# Patient Record
Sex: FEMALE | Race: BLACK OR AFRICAN AMERICAN | NOT HISPANIC OR LATINO
[De-identification: names, ages, dates, MRNs, and addresses within clinical notes are randomized per-mention and may not be internally consistent; named-entity substitution may affect disease eponyms.]

---

## 2017-02-02 ENCOUNTER — RESULT REVIEW (OUTPATIENT)
Age: 52
End: 2017-02-02

## 2017-02-03 ENCOUNTER — OUTPATIENT (OUTPATIENT)
Dept: OUTPATIENT SERVICES | Facility: HOSPITAL | Age: 52
LOS: 1 days | End: 2017-02-03
Payer: MEDICAID

## 2017-02-03 PROCEDURE — 76942 ECHO GUIDE FOR BIOPSY: CPT | Mod: 26

## 2017-02-03 PROCEDURE — 88173 CYTOPATH EVAL FNA REPORT: CPT

## 2017-02-03 PROCEDURE — 76942 ECHO GUIDE FOR BIOPSY: CPT

## 2017-02-03 PROCEDURE — 10022: CPT

## 2017-03-16 PROBLEM — Z00.00 ENCOUNTER FOR PREVENTIVE HEALTH EXAMINATION: Status: ACTIVE | Noted: 2017-03-16

## 2017-03-20 ENCOUNTER — APPOINTMENT (OUTPATIENT)
Dept: OTOLARYNGOLOGY | Facility: CLINIC | Age: 52
End: 2017-03-20

## 2017-03-20 VITALS
WEIGHT: 231 LBS | DIASTOLIC BLOOD PRESSURE: 94 MMHG | BODY MASS INDEX: 36.26 KG/M2 | HEIGHT: 67 IN | SYSTOLIC BLOOD PRESSURE: 170 MMHG | TEMPERATURE: 98.4 F | OXYGEN SATURATION: 97 % | HEART RATE: 85 BPM

## 2017-03-20 DIAGNOSIS — Z82.49 FAMILY HISTORY OF ISCHEMIC HEART DISEASE AND OTHER DISEASES OF THE CIRCULATORY SYSTEM: ICD-10-CM

## 2017-03-20 DIAGNOSIS — Z83.3 FAMILY HISTORY OF DIABETES MELLITUS: ICD-10-CM

## 2017-03-20 DIAGNOSIS — Z78.9 OTHER SPECIFIED HEALTH STATUS: ICD-10-CM

## 2017-03-20 DIAGNOSIS — Z86.39 PERSONAL HISTORY OF OTHER ENDOCRINE, NUTRITIONAL AND METABOLIC DISEASE: ICD-10-CM

## 2017-03-20 DIAGNOSIS — Z56.0 UNEMPLOYMENT, UNSPECIFIED: ICD-10-CM

## 2017-03-20 SDOH — ECONOMIC STABILITY - INCOME SECURITY: UNEMPLOYMENT, UNSPECIFIED: Z56.0

## 2017-03-28 ENCOUNTER — RESULT REVIEW (OUTPATIENT)
Age: 52
End: 2017-03-28

## 2017-03-28 VITALS
TEMPERATURE: 98 F | RESPIRATION RATE: 16 BRPM | DIASTOLIC BLOOD PRESSURE: 77 MMHG | WEIGHT: 231.04 LBS | HEIGHT: 67 IN | HEART RATE: 97 BPM | OXYGEN SATURATION: 97 % | SYSTOLIC BLOOD PRESSURE: 143 MMHG

## 2017-03-29 ENCOUNTER — OUTPATIENT (OUTPATIENT)
Dept: OUTPATIENT SERVICES | Facility: HOSPITAL | Age: 52
LOS: 1 days | Discharge: HOME | End: 2017-03-29
Payer: COMMERCIAL

## 2017-03-29 ENCOUNTER — APPOINTMENT (OUTPATIENT)
Dept: OTOLARYNGOLOGY | Facility: HOSPITAL | Age: 52
End: 2017-03-29

## 2017-03-29 DIAGNOSIS — C73 MALIGNANT NEOPLASM OF THYROID GLAND: ICD-10-CM

## 2017-03-29 DIAGNOSIS — Z79.84 LONG TERM (CURRENT) USE OF ORAL HYPOGLYCEMIC DRUGS: ICD-10-CM

## 2017-03-29 DIAGNOSIS — E11.9 TYPE 2 DIABETES MELLITUS WITHOUT COMPLICATIONS: ICD-10-CM

## 2017-03-29 DIAGNOSIS — Z98.891 HISTORY OF UTERINE SCAR FROM PREVIOUS SURGERY: Chronic | ICD-10-CM

## 2017-03-29 DIAGNOSIS — Z90.710 ACQUIRED ABSENCE OF BOTH CERVIX AND UTERUS: Chronic | ICD-10-CM

## 2017-03-29 DIAGNOSIS — I10 ESSENTIAL (PRIMARY) HYPERTENSION: ICD-10-CM

## 2017-03-29 DIAGNOSIS — D35.1 BENIGN NEOPLASM OF PARATHYROID GLAND: ICD-10-CM

## 2017-03-29 DIAGNOSIS — Z98.890 OTHER SPECIFIED POSTPROCEDURAL STATES: Chronic | ICD-10-CM

## 2017-03-29 LAB
ALBUMIN SERPL ELPH-MCNC: 3.4 G/DL — SIGNIFICANT CHANGE UP (ref 3.4–5)
ALP SERPL-CCNC: 57 U/L — SIGNIFICANT CHANGE UP (ref 40–120)
ALT FLD-CCNC: 38 U/L — SIGNIFICANT CHANGE UP (ref 12–42)
ANION GAP SERPL CALC-SCNC: 11 MMOL/L — SIGNIFICANT CHANGE UP (ref 9–16)
AST SERPL-CCNC: 20 U/L — SIGNIFICANT CHANGE UP (ref 15–37)
BASOPHILS NFR BLD AUTO: 0.1 % — SIGNIFICANT CHANGE UP (ref 0–2)
BILIRUB SERPL-MCNC: 0.3 MG/DL — SIGNIFICANT CHANGE UP (ref 0.2–1.2)
BUN SERPL-MCNC: 13 MG/DL — SIGNIFICANT CHANGE UP (ref 7–23)
CALCIUM SERPL-MCNC: 9.4 MG/DL — SIGNIFICANT CHANGE UP (ref 8.5–10.5)
CHLORIDE SERPL-SCNC: 99 MMOL/L — SIGNIFICANT CHANGE UP (ref 96–108)
CO2 SERPL-SCNC: 26 MMOL/L — SIGNIFICANT CHANGE UP (ref 22–31)
CREAT SERPL-MCNC: 1.03 MG/DL — SIGNIFICANT CHANGE UP (ref 0.5–1.3)
GLUCOSE SERPL-MCNC: 246 MG/DL — HIGH (ref 70–99)
HCT VFR BLD CALC: 39 % — SIGNIFICANT CHANGE UP (ref 34.5–45)
HGB BLD-MCNC: 12.7 G/DL — SIGNIFICANT CHANGE UP (ref 11.5–15.5)
LYMPHOCYTES # BLD AUTO: 7 % — LOW (ref 13–44)
MAGNESIUM SERPL-MCNC: 1.3 MG/DL — LOW (ref 1.6–2.4)
MCHC RBC-ENTMCNC: 27.7 PG — SIGNIFICANT CHANGE UP (ref 27–34)
MCHC RBC-ENTMCNC: 32.6 G/DL — SIGNIFICANT CHANGE UP (ref 32–36)
MCV RBC AUTO: 85 FL — SIGNIFICANT CHANGE UP (ref 80–100)
MONOCYTES NFR BLD AUTO: 1.1 % — LOW (ref 2–14)
NEUTROPHILS NFR BLD AUTO: 91.8 % — HIGH (ref 43–77)
PHOSPHATE SERPL-MCNC: 2.9 MG/DL — SIGNIFICANT CHANGE UP (ref 2.5–4.5)
PLATELET # BLD AUTO: 357 K/UL — SIGNIFICANT CHANGE UP (ref 150–400)
POTASSIUM SERPL-MCNC: 4.2 MMOL/L — SIGNIFICANT CHANGE UP (ref 3.5–5.3)
POTASSIUM SERPL-SCNC: 4.2 MMOL/L — SIGNIFICANT CHANGE UP (ref 3.5–5.3)
PROT SERPL-MCNC: 8.1 G/DL — SIGNIFICANT CHANGE UP (ref 6.4–8.2)
PTH-INTACT IO % DIF SERPL: 15.9 PG/ML — SIGNIFICANT CHANGE UP (ref 8.5–72.5)
PTH-INTACT IO % DIF SERPL: 9.2 PG/ML — SIGNIFICANT CHANGE UP (ref 8.5–72.5)
PTH-INTACT IO % DIF SERPL: 99.6 PG/ML — HIGH (ref 8.5–72.5)
RBC # BLD: 4.59 M/UL — SIGNIFICANT CHANGE UP (ref 3.8–5.2)
RBC # FLD: 13.3 % — SIGNIFICANT CHANGE UP (ref 10.3–16.9)
SODIUM SERPL-SCNC: 136 MMOL/L — SIGNIFICANT CHANGE UP (ref 135–145)
WBC # BLD: 16 K/UL — HIGH (ref 3.8–10.5)
WBC # FLD AUTO: 16 K/UL — HIGH (ref 3.8–10.5)

## 2017-03-29 PROCEDURE — 60500 EXPLORE PARATHYROID GLANDS: CPT | Mod: 59

## 2017-03-29 PROCEDURE — 60252 REMOVAL OF THYROID: CPT

## 2017-03-29 RX ORDER — HYDRALAZINE HCL 50 MG
10 TABLET ORAL ONCE
Qty: 0 | Refills: 0 | Status: COMPLETED | OUTPATIENT
Start: 2017-03-29 | End: 2017-03-29

## 2017-03-29 RX ORDER — ONDANSETRON 8 MG/1
4 TABLET, FILM COATED ORAL EVERY 6 HOURS
Qty: 0 | Refills: 0 | Status: DISCONTINUED | OUTPATIENT
Start: 2017-03-29 | End: 2017-03-30

## 2017-03-29 RX ORDER — HYDROMORPHONE HYDROCHLORIDE 2 MG/ML
1 INJECTION INTRAMUSCULAR; INTRAVENOUS; SUBCUTANEOUS EVERY 4 HOURS
Qty: 0 | Refills: 0 | Status: DISCONTINUED | OUTPATIENT
Start: 2017-03-29 | End: 2017-03-30

## 2017-03-29 RX ORDER — SODIUM CHLORIDE 9 MG/ML
1000 INJECTION, SOLUTION INTRAVENOUS
Qty: 0 | Refills: 0 | Status: DISCONTINUED | OUTPATIENT
Start: 2017-03-29 | End: 2017-03-30

## 2017-03-29 RX ORDER — DEXTROSE 50 % IN WATER 50 %
1 SYRINGE (ML) INTRAVENOUS ONCE
Qty: 0 | Refills: 0 | Status: DISCONTINUED | OUTPATIENT
Start: 2017-03-29 | End: 2017-03-30

## 2017-03-29 RX ORDER — CALCITRIOL 0.5 UG/1
0.5 CAPSULE ORAL DAILY
Qty: 0 | Refills: 0 | Status: DISCONTINUED | OUTPATIENT
Start: 2017-03-29 | End: 2017-03-30

## 2017-03-29 RX ORDER — INSULIN LISPRO 100/ML
VIAL (ML) SUBCUTANEOUS
Qty: 0 | Refills: 0 | Status: DISCONTINUED | OUTPATIENT
Start: 2017-03-29 | End: 2017-03-30

## 2017-03-29 RX ORDER — SODIUM CHLORIDE 9 MG/ML
500 INJECTION INTRAMUSCULAR; INTRAVENOUS; SUBCUTANEOUS ONCE
Qty: 0 | Refills: 0 | Status: COMPLETED | OUTPATIENT
Start: 2017-03-29 | End: 2017-03-29

## 2017-03-29 RX ORDER — DEXTROSE 50 % IN WATER 50 %
12.5 SYRINGE (ML) INTRAVENOUS ONCE
Qty: 0 | Refills: 0 | Status: DISCONTINUED | OUTPATIENT
Start: 2017-03-29 | End: 2017-03-30

## 2017-03-29 RX ADMIN — CALCITRIOL 0.5 MICROGRAM(S): 0.5 CAPSULE ORAL at 21:17

## 2017-03-29 RX ADMIN — SODIUM CHLORIDE 1000 MILLILITER(S): 9 INJECTION INTRAMUSCULAR; INTRAVENOUS; SUBCUTANEOUS at 20:23

## 2017-03-29 RX ADMIN — Medication 10 MILLIGRAM(S): at 19:37

## 2017-03-29 RX ADMIN — Medication 4: at 21:59

## 2017-03-29 NOTE — BRIEF OPERATIVE NOTE - POST-OP DX
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

## 2017-03-30 VITALS
TEMPERATURE: 99 F | SYSTOLIC BLOOD PRESSURE: 141 MMHG | RESPIRATION RATE: 16 BRPM | HEART RATE: 108 BPM | DIASTOLIC BLOOD PRESSURE: 64 MMHG | OXYGEN SATURATION: 98 %

## 2017-03-30 LAB
ALBUMIN SERPL ELPH-MCNC: 3.3 G/DL — LOW (ref 3.4–5)
ALP SERPL-CCNC: 55 U/L — SIGNIFICANT CHANGE UP (ref 40–120)
ALT FLD-CCNC: 35 U/L — SIGNIFICANT CHANGE UP (ref 12–42)
ANION GAP SERPL CALC-SCNC: 8 MMOL/L — LOW (ref 9–16)
AST SERPL-CCNC: 27 U/L — SIGNIFICANT CHANGE UP (ref 15–37)
BILIRUB SERPL-MCNC: 0.4 MG/DL — SIGNIFICANT CHANGE UP (ref 0.2–1.2)
BUN SERPL-MCNC: 14 MG/DL — SIGNIFICANT CHANGE UP (ref 7–23)
CALCIUM SERPL-MCNC: 9.2 MG/DL — SIGNIFICANT CHANGE UP (ref 8.5–10.5)
CHLORIDE SERPL-SCNC: 100 MMOL/L — SIGNIFICANT CHANGE UP (ref 96–108)
CO2 SERPL-SCNC: 27 MMOL/L — SIGNIFICANT CHANGE UP (ref 22–31)
CREAT SERPL-MCNC: 0.86 MG/DL — SIGNIFICANT CHANGE UP (ref 0.5–1.3)
GLUCOSE SERPL-MCNC: 190 MG/DL — HIGH (ref 70–99)
HBA1C BLD-MCNC: 7.5 % — HIGH (ref 4.8–5.6)
HCT VFR BLD CALC: 37.5 % — SIGNIFICANT CHANGE UP (ref 34.5–45)
HGB BLD-MCNC: 12.1 G/DL — SIGNIFICANT CHANGE UP (ref 11.5–15.5)
MAGNESIUM SERPL-MCNC: 1.4 MG/DL — LOW (ref 1.6–2.4)
MCHC RBC-ENTMCNC: 27.4 PG — SIGNIFICANT CHANGE UP (ref 27–34)
MCHC RBC-ENTMCNC: 32.3 G/DL — SIGNIFICANT CHANGE UP (ref 32–36)
MCV RBC AUTO: 84.8 FL — SIGNIFICANT CHANGE UP (ref 80–100)
PHOSPHATE SERPL-MCNC: 2.3 MG/DL — LOW (ref 2.5–4.5)
PLATELET # BLD AUTO: 328 K/UL — SIGNIFICANT CHANGE UP (ref 150–400)
POTASSIUM SERPL-MCNC: 4.5 MMOL/L — SIGNIFICANT CHANGE UP (ref 3.5–5.3)
POTASSIUM SERPL-SCNC: 4.5 MMOL/L — SIGNIFICANT CHANGE UP (ref 3.5–5.3)
PROT SERPL-MCNC: 8.1 G/DL — SIGNIFICANT CHANGE UP (ref 6.4–8.2)
RBC # BLD: 4.42 M/UL — SIGNIFICANT CHANGE UP (ref 3.8–5.2)
RBC # FLD: 13.5 % — SIGNIFICANT CHANGE UP (ref 10.3–16.9)
SODIUM SERPL-SCNC: 135 MMOL/L — SIGNIFICANT CHANGE UP (ref 135–145)
WBC # BLD: 16.8 K/UL — HIGH (ref 3.8–10.5)
WBC # FLD AUTO: 16.8 K/UL — HIGH (ref 3.8–10.5)

## 2017-03-30 PROCEDURE — 80053 COMPREHEN METABOLIC PANEL: CPT

## 2017-03-30 PROCEDURE — 88331 PATH CONSLTJ SURG 1 BLK 1SPC: CPT

## 2017-03-30 PROCEDURE — 83735 ASSAY OF MAGNESIUM: CPT

## 2017-03-30 PROCEDURE — 83036 HEMOGLOBIN GLYCOSYLATED A1C: CPT

## 2017-03-30 PROCEDURE — 85027 COMPLETE CBC AUTOMATED: CPT

## 2017-03-30 PROCEDURE — 60252 REMOVAL OF THYROID: CPT

## 2017-03-30 PROCEDURE — 83970 ASSAY OF PARATHORMONE: CPT

## 2017-03-30 PROCEDURE — 84100 ASSAY OF PHOSPHORUS: CPT

## 2017-03-30 PROCEDURE — 36415 COLL VENOUS BLD VENIPUNCTURE: CPT

## 2017-03-30 PROCEDURE — 88309 TISSUE EXAM BY PATHOLOGIST: CPT

## 2017-03-30 PROCEDURE — 88305 TISSUE EXAM BY PATHOLOGIST: CPT

## 2017-03-30 PROCEDURE — 85025 COMPLETE CBC W/AUTO DIFF WBC: CPT

## 2017-03-30 RX ORDER — MAGNESIUM SULFATE 500 MG/ML
2 VIAL (ML) INJECTION
Qty: 0 | Refills: 0 | Status: COMPLETED | OUTPATIENT
Start: 2017-03-30 | End: 2017-03-30

## 2017-03-30 RX ADMIN — Medication: at 07:00

## 2017-03-30 RX ADMIN — Medication 50 GRAM(S): at 07:51

## 2017-03-30 RX ADMIN — Medication 63.75 MILLIMOLE(S): at 09:39

## 2017-03-30 RX ADMIN — Medication 50 GRAM(S): at 09:00

## 2017-03-30 NOTE — PROGRESS NOTE ADULT - SUBJECTIVE AND OBJECTIVE BOX
O/N: s/p total thyroidectomy. Corrected Ca 9.88 , Osiel  text results , pth 9.2 , patient tachy 117,  given Rocaltrol    STATUS POST:  Total Thyroidectomy    POST OPERATIVE DAY #: 1 O/N: s/p total thyroidectomy. Corrected Ca 9.88 , Osiel  text results , pth 9.2 , patient tachy 117,  given Rocaltrol    STATUS POST:  Total Thyroidectomy    POST OPERATIVE DAY #: 1    SUBJECTIVE:   No complaints. Pain controlled. Denies CP/SOB/N/V/palpitation/perioral numbness/weakness      MEDICATIONS  (STANDING):  lactated ringers. 1000milliLiter(s) IV Continuous <Continuous>  calcitriol   Capsule 0.5MICROGram(s) Oral daily  insulin lispro (HumaLOG) corrective regimen sliding scale  SubCutaneous Before meals and at bedtime  dextrose 50% Injectable 12.5Gram(s) IV Push once    MEDICATIONS  (PRN):  HYDROmorphone  Injectable 1milliGRAM(s) IV Push every 4 hours PRN Moderate Pain  ondansetron Injectable 4milliGRAM(s) IV Push every 6 hours PRN Nausea  dextrose Gel 1Dose(s) Oral once PRN Blood Glucose LESS THAN 70 milliGRAM(s)/deciliter      Vital Signs Last 24 Hrs  T(C): 37.5, Max: 37.5 (03-30 @ 05:07)  T(F): 99.5, Max: 99.5 (03-30 @ 05:07)  HR: 115 (109 - 121)  BP: 163/58 (137/65 - 175/83)  BP(mean): --  RR: 16 (16 - 18)  SpO2: 98% (92% - 99%)    Physical Exam:    General: A&Ox3, NAD.   Neuro: CN II-XII grossly intact  Neck: bulky dressing in place, CDI. MARTHA drain with SS output.  CV: RRR  Pulm: nonlabored breathing  Abd: soft, NTND, no guarding, no rebound.  Ext: No edema. WWP.       I&O's Detail    I & Os for current day (as of 30 Mar 2017 07:14)  =============================================  IN:    lactated ringers.: 240 ml    Total IN: 240 ml  ---------------------------------------------  OUT:    Voided: 700 ml    Drain: 25 ml    Total OUT: 725 ml  ---------------------------------------------  Total NET: -485 ml      LABS:                        12.1   16.8  )-----------( 328      ( 30 Mar 2017 04:06 )             37.5     30 Mar 2017 04:06    135    |  100    |  14     ----------------------------<  190    4.5     |  27     |  0.86     Ca    9.2        30 Mar 2017 04:06  Phos  2.3       30 Mar 2017 04:06  Mg     1.4       30 Mar 2017 04:06    TPro  8.1    /  Alb  3.3    /  TBili  0.4    /  DBili  x      /  AST  27     /  ALT  35     /  AlkPhos  55     30 Mar 2017 04:06

## 2017-03-30 NOTE — PROGRESS NOTE ADULT - SUBJECTIVE AND OBJECTIVE BOX
POST-OPERATIVE NOTE    Procedure: Total thyroidectomy    Diagnosis/Indication: Left Thyroid nodule     Surgeon: Dr. Cartagena    S: Pt complains of incisional pain,    O:  T(C): 37.3, Max: 37.3 (03-30 @ 04:00)  T(F): 99.1, Max: 99.1 (03-30 @ 04:00)  HR: 113 (113 - 117)  BP: 159/69 (159/69 - 160/81)  RR: 16 (16 - 16)  SpO2: 99% (97% - 99%)  Wt(kg): --                        12.7   16.0  )-----------( 357      ( 29 Mar 2017 22:15 )             39.0     29 Mar 2017 22:15    136    |  99     |  13     ----------------------------<  246    4.2     |  26     |  1.03     Ca    9.4        29 Mar 2017 22:15  Phos  2.9       29 Mar 2017 22:15  Mg     1.3       29 Mar 2017 22:15    TPro  8.1    /  Alb  3.4    /  TBili  0.3    /  DBili  x      /  AST  20     /  ALT  38     /  AlkPhos  57     29 Mar 2017 22:15      Gen: NAD, resting comfortably in bed  C/V: NSR  Pulm: Nonlabored breathing, no respiratory distress  Abd: soft, NT/ND  Extrem: WWP, no calf edema, SCDs in place    Incision bandage clean dry and intact   MARTHA drain serosanguineous       A/P: 52yFemale s/p above procedure  Diet: Clear liquid diet   IVF: LR@120  Pain/nausea control  Dispo plan: Possible Discharge  home today   f/u PTH

## 2017-03-30 NOTE — PROGRESS NOTE ADULT - ASSESSMENT
51 y/o female s/p total thyroidectomy    Diet: Clear liquid diet   IVF: LR@120  Pain/nausea control  Dispo plan: Possible Discharge  home today   f/u PTH

## 2017-04-03 ENCOUNTER — APPOINTMENT (OUTPATIENT)
Dept: OTOLARYNGOLOGY | Facility: CLINIC | Age: 52
End: 2017-04-03

## 2017-04-03 RX ORDER — AZITHROMYCIN DIHYDRATE 500 MG/1
500 TABLET, FILM COATED ORAL DAILY
Qty: 1 | Refills: 0 | Status: COMPLETED | COMMUNITY
Start: 2017-03-28 | End: 2017-04-03

## 2017-04-03 RX ORDER — CALCITRIOL 0.5 UG/1
0.5 CAPSULE, LIQUID FILLED ORAL
Qty: 5 | Refills: 0 | Status: COMPLETED | COMMUNITY
Start: 2017-03-28 | End: 2017-04-03

## 2017-04-03 RX ORDER — ACETAMINOPHEN AND CODEINE 300; 30 MG/1; MG/1
300-30 TABLET ORAL
Qty: 30 | Refills: 0 | Status: COMPLETED | COMMUNITY
Start: 2017-03-28 | End: 2017-04-03

## 2017-04-12 LAB — SURGICAL PATHOLOGY STUDY: SIGNIFICANT CHANGE UP

## 2017-06-08 PROBLEM — E04.1 NONTOXIC SINGLE THYROID NODULE: Chronic | Status: ACTIVE | Noted: 2017-03-28

## 2017-06-08 PROBLEM — E11.9 TYPE 2 DIABETES MELLITUS WITHOUT COMPLICATIONS: Chronic | Status: ACTIVE | Noted: 2017-03-28

## 2017-06-08 PROBLEM — I10 ESSENTIAL (PRIMARY) HYPERTENSION: Chronic | Status: ACTIVE | Noted: 2017-03-28

## 2017-07-06 ENCOUNTER — OTHER (OUTPATIENT)
Age: 52
End: 2017-07-06

## 2017-07-07 ENCOUNTER — APPOINTMENT (OUTPATIENT)
Dept: ORTHOPEDIC SURGERY | Facility: AMBULATORY SURGERY CENTER | Age: 52
End: 2017-07-07

## 2017-07-07 ENCOUNTER — OUTPATIENT (OUTPATIENT)
Dept: OUTPATIENT SERVICES | Facility: HOSPITAL | Age: 52
LOS: 1 days | Discharge: HOME | End: 2017-07-07
Payer: MEDICAID

## 2017-07-07 DIAGNOSIS — Z98.891 HISTORY OF UTERINE SCAR FROM PREVIOUS SURGERY: Chronic | ICD-10-CM

## 2017-07-07 DIAGNOSIS — Z90.710 ACQUIRED ABSENCE OF BOTH CERVIX AND UTERUS: Chronic | ICD-10-CM

## 2017-07-07 DIAGNOSIS — Z98.890 OTHER SPECIFIED POSTPROCEDURAL STATES: Chronic | ICD-10-CM

## 2017-07-07 PROCEDURE — 29824 SHO ARTHRS SRG DSTL CLAVICLC: CPT | Mod: LT

## 2017-07-07 PROCEDURE — 29826 SHO ARTHRS SRG DECOMPRESSION: CPT | Mod: LT

## 2017-07-07 PROCEDURE — 29823 SHO ARTHRS SRG XTNSV DBRDMT: CPT | Mod: LT

## 2017-07-07 PROCEDURE — 20926: CPT | Mod: 59

## 2017-07-11 DIAGNOSIS — E11.9 TYPE 2 DIABETES MELLITUS WITHOUT COMPLICATIONS: ICD-10-CM

## 2017-07-11 DIAGNOSIS — I10 ESSENTIAL (PRIMARY) HYPERTENSION: ICD-10-CM

## 2017-07-11 DIAGNOSIS — M75.42 IMPINGEMENT SYNDROME OF LEFT SHOULDER: ICD-10-CM

## 2017-07-11 DIAGNOSIS — M19.012 PRIMARY OSTEOARTHRITIS, LEFT SHOULDER: ICD-10-CM

## 2017-07-11 DIAGNOSIS — Z79.82 LONG TERM (CURRENT) USE OF ASPIRIN: ICD-10-CM

## 2017-07-11 DIAGNOSIS — M75.102 UNSPECIFIED ROTATOR CUFF TEAR OR RUPTURE OF LEFT SHOULDER, NOT SPECIFIED AS TRAUMATIC: ICD-10-CM

## 2017-07-11 DIAGNOSIS — E04.9 NONTOXIC GOITER, UNSPECIFIED: ICD-10-CM

## 2017-07-11 DIAGNOSIS — Z79.899 OTHER LONG TERM (CURRENT) DRUG THERAPY: ICD-10-CM

## 2017-07-11 DIAGNOSIS — Z79.84 LONG TERM (CURRENT) USE OF ORAL HYPOGLYCEMIC DRUGS: ICD-10-CM

## 2017-07-11 DIAGNOSIS — E78.5 HYPERLIPIDEMIA, UNSPECIFIED: ICD-10-CM

## 2017-07-11 DIAGNOSIS — E66.9 OBESITY, UNSPECIFIED: ICD-10-CM

## 2018-06-18 ENCOUNTER — APPOINTMENT (OUTPATIENT)
Dept: ORTHOPEDIC SURGERY | Facility: CLINIC | Age: 53
End: 2018-06-18
Payer: MEDICAID

## 2018-06-18 PROCEDURE — 99213 OFFICE O/P EST LOW 20 MIN: CPT | Mod: 25

## 2018-06-18 PROCEDURE — 20610 DRAIN/INJ JOINT/BURSA W/O US: CPT | Mod: LT

## 2018-06-25 ENCOUNTER — APPOINTMENT (OUTPATIENT)
Dept: ORTHOPEDIC SURGERY | Facility: CLINIC | Age: 53
End: 2018-06-25
Payer: MEDICAID

## 2018-06-25 DIAGNOSIS — M17.12 UNILATERAL PRIMARY OSTEOARTHRITIS, LEFT KNEE: ICD-10-CM

## 2018-06-25 PROCEDURE — 99213 OFFICE O/P EST LOW 20 MIN: CPT

## 2018-06-26 ENCOUNTER — LABORATORY RESULT (OUTPATIENT)
Age: 53
End: 2018-06-26

## 2018-06-26 ENCOUNTER — APPOINTMENT (OUTPATIENT)
Dept: ENDOCRINOLOGY | Facility: CLINIC | Age: 53
End: 2018-06-26
Payer: MEDICAID

## 2018-06-26 VITALS
WEIGHT: 233 LBS | DIASTOLIC BLOOD PRESSURE: 90 MMHG | SYSTOLIC BLOOD PRESSURE: 137 MMHG | BODY MASS INDEX: 36.57 KG/M2 | HEART RATE: 90 BPM | HEIGHT: 67 IN

## 2018-06-26 PROCEDURE — 83036 HEMOGLOBIN GLYCOSYLATED A1C: CPT | Mod: QW

## 2018-06-26 PROCEDURE — 99204 OFFICE O/P NEW MOD 45 MIN: CPT | Mod: 25

## 2018-06-26 RX ORDER — IBUPROFEN 200 MG/1
CAPSULE, LIQUID FILLED ORAL
Refills: 0 | Status: DISCONTINUED | COMMUNITY
End: 2018-06-26

## 2018-06-26 RX ORDER — MELOXICAM 15 MG/1
15 TABLET ORAL
Qty: 30 | Refills: 0 | Status: DISCONTINUED | COMMUNITY
Start: 2016-11-18 | End: 2018-06-26

## 2018-06-26 RX ORDER — HYDROCODONE BITARTRATE AND ACETAMINOPHEN 5; 325 MG/1; MG/1
5-325 TABLET ORAL
Qty: 30 | Refills: 0 | Status: DISCONTINUED | COMMUNITY
Start: 2017-07-06 | End: 2018-06-26

## 2018-06-26 RX ORDER — METFORMIN ER 500 MG 500 MG/1
500 TABLET ORAL
Qty: 60 | Refills: 0 | Status: DISCONTINUED | COMMUNITY
Start: 2016-12-08 | End: 2018-06-26

## 2018-06-26 RX ORDER — TRAMADOL HYDROCHLORIDE 50 MG/1
50 TABLET, COATED ORAL
Qty: 20 | Refills: 0 | Status: DISCONTINUED | COMMUNITY
Start: 2017-01-03 | End: 2018-06-26

## 2018-06-26 RX ORDER — METFORMIN HYDROCHLORIDE 500 MG/1
500 TABLET, COATED ORAL
Refills: 0 | Status: DISCONTINUED | COMMUNITY
End: 2018-06-26

## 2018-06-26 RX ORDER — CYCLOBENZAPRINE HYDROCHLORIDE 10 MG/1
10 TABLET, FILM COATED ORAL
Qty: 30 | Refills: 0 | Status: DISCONTINUED | COMMUNITY
Start: 2016-10-19 | End: 2018-06-26

## 2018-06-26 RX ORDER — HYDROXYZINE PAMOATE 50 MG/1
50 CAPSULE ORAL
Qty: 1 | Refills: 0 | Status: DISCONTINUED | COMMUNITY
Start: 2016-10-19 | End: 2018-06-26

## 2018-06-26 RX ORDER — ATORVASTATIN CALCIUM 10 MG/1
10 TABLET, FILM COATED ORAL
Qty: 30 | Refills: 0 | Status: DISCONTINUED | COMMUNITY
Start: 2016-11-30 | End: 2018-06-26

## 2018-06-26 RX ORDER — OXYCODONE HYDROCHLORIDE 20 MG/1
20 TABLET, FILM COATED, EXTENDED RELEASE ORAL
Qty: 10 | Refills: 0 | Status: DISCONTINUED | COMMUNITY
Start: 2017-07-06 | End: 2018-06-26

## 2018-06-26 RX ORDER — DIAZEPAM 2 MG/1
2 TABLET ORAL
Qty: 2 | Refills: 0 | Status: DISCONTINUED | COMMUNITY
Start: 2016-11-09 | End: 2018-06-26

## 2018-06-28 LAB
25(OH)D3 SERPL-MCNC: 14.4 NG/ML
ALBUMIN SERPL ELPH-MCNC: 4.7 G/DL
ALP BLD-CCNC: 49 U/L
ALT SERPL-CCNC: 23 U/L
ANION GAP SERPL CALC-SCNC: 15 MMOL/L
AST SERPL-CCNC: 19 U/L
BASOPHILS # BLD AUTO: 0.03 K/UL
BASOPHILS NFR BLD AUTO: 0.4 %
BILIRUB SERPL-MCNC: 0.2 MG/DL
BUN SERPL-MCNC: 17 MG/DL
CALCIUM SERPL-MCNC: 9.8 MG/DL
CHLORIDE SERPL-SCNC: 99 MMOL/L
CHOLEST SERPL-MCNC: 215 MG/DL
CHOLEST/HDLC SERPL: 2.6 RATIO
CO2 SERPL-SCNC: 27 MMOL/L
CREAT SERPL-MCNC: 1.21 MG/DL
CREAT SPEC-SCNC: 198 MG/DL
EOSINOPHIL # BLD AUTO: 0.11 K/UL
EOSINOPHIL NFR BLD AUTO: 1.4 %
GLUCOSE SERPL-MCNC: 99 MG/DL
HCT VFR BLD CALC: 40.8 %
HDLC SERPL-MCNC: 84 MG/DL
HGB BLD-MCNC: 12.9 G/DL
IMM GRANULOCYTES NFR BLD AUTO: 0.4 %
LDLC SERPL CALC-MCNC: 102 MG/DL
LYMPHOCYTES # BLD AUTO: 3.17 K/UL
LYMPHOCYTES NFR BLD AUTO: 40.3 %
MAN DIFF?: NORMAL
MCHC RBC-ENTMCNC: 28.2 PG
MCHC RBC-ENTMCNC: 31.6 GM/DL
MCV RBC AUTO: 89.3 FL
MICROALBUMIN 24H UR DL<=1MG/L-MCNC: 55.9 MG/DL
MICROALBUMIN/CREAT 24H UR-RTO: 282 MG/G
MONOCYTES # BLD AUTO: 0.37 K/UL
MONOCYTES NFR BLD AUTO: 4.7 %
NEUTROPHILS # BLD AUTO: 4.15 K/UL
NEUTROPHILS NFR BLD AUTO: 52.8 %
PLATELET # BLD AUTO: 335 K/UL
POTASSIUM SERPL-SCNC: 4.7 MMOL/L
PROT SERPL-MCNC: 8.3 G/DL
RBC # BLD: 4.57 M/UL
RBC # FLD: 14.3 %
SODIUM SERPL-SCNC: 141 MMOL/L
THYROGLOB AB SERPL IA-ACNC: <1.8 IU/ML
TRIGL SERPL-MCNC: 144 MG/DL
TSH SERPL-ACNC: 93.08 UIU/ML
VIT B12 SERPL-MCNC: 815 PG/ML
WBC # FLD AUTO: 7.86 K/UL

## 2018-06-29 ENCOUNTER — RESULT REVIEW (OUTPATIENT)
Age: 53
End: 2018-06-29

## 2018-07-04 ENCOUNTER — FORM ENCOUNTER (OUTPATIENT)
Age: 53
End: 2018-07-04

## 2018-07-05 ENCOUNTER — OUTPATIENT (OUTPATIENT)
Dept: OUTPATIENT SERVICES | Facility: HOSPITAL | Age: 53
LOS: 1 days | End: 2018-07-05

## 2018-07-05 ENCOUNTER — CHART COPY (OUTPATIENT)
Age: 53
End: 2018-07-05

## 2018-07-05 ENCOUNTER — APPOINTMENT (OUTPATIENT)
Dept: ULTRASOUND IMAGING | Facility: HOSPITAL | Age: 53
End: 2018-07-05

## 2018-07-05 DIAGNOSIS — Z98.891 HISTORY OF UTERINE SCAR FROM PREVIOUS SURGERY: Chronic | ICD-10-CM

## 2018-07-05 DIAGNOSIS — Z90.710 ACQUIRED ABSENCE OF BOTH CERVIX AND UTERUS: Chronic | ICD-10-CM

## 2018-07-05 DIAGNOSIS — Z98.890 OTHER SPECIFIED POSTPROCEDURAL STATES: Chronic | ICD-10-CM

## 2018-07-05 PROCEDURE — 76536 US EXAM OF HEAD AND NECK: CPT | Mod: 26

## 2018-07-08 ENCOUNTER — FORM ENCOUNTER (OUTPATIENT)
Age: 53
End: 2018-07-08

## 2018-07-09 ENCOUNTER — OUTPATIENT (OUTPATIENT)
Dept: OUTPATIENT SERVICES | Facility: HOSPITAL | Age: 53
LOS: 1 days | End: 2018-07-09
Payer: MEDICAID

## 2018-07-09 DIAGNOSIS — Z98.891 HISTORY OF UTERINE SCAR FROM PREVIOUS SURGERY: Chronic | ICD-10-CM

## 2018-07-09 DIAGNOSIS — Z85.850 PERSONAL HISTORY OF MALIGNANT NEOPLASM OF THYROID: ICD-10-CM

## 2018-07-09 DIAGNOSIS — Z90.710 ACQUIRED ABSENCE OF BOTH CERVIX AND UTERUS: Chronic | ICD-10-CM

## 2018-07-09 DIAGNOSIS — Z98.890 OTHER SPECIFIED POSTPROCEDURAL STATES: Chronic | ICD-10-CM

## 2018-07-09 DIAGNOSIS — E89.0 POSTPROCEDURAL HYPOTHYROIDISM: ICD-10-CM

## 2018-07-09 PROCEDURE — 79005 NUCLEAR RX ORAL ADMIN: CPT | Mod: 26

## 2018-07-09 RX ORDER — THYROTROPIN ALFA 0.9 MG/ML
0.9 INJECTION, POWDER, FOR SOLUTION INTRAMUSCULAR ONCE
Qty: 0 | Refills: 0 | Status: DISCONTINUED | OUTPATIENT
Start: 2018-07-09 | End: 2018-07-24

## 2018-07-12 ENCOUNTER — FORM ENCOUNTER (OUTPATIENT)
Age: 53
End: 2018-07-12

## 2018-07-13 PROCEDURE — 78018 THYROID MET IMAGING BODY: CPT

## 2018-07-13 PROCEDURE — 78018 THYROID MET IMAGING BODY: CPT | Mod: 26

## 2018-07-13 PROCEDURE — 79005 NUCLEAR RX ORAL ADMIN: CPT

## 2018-07-13 PROCEDURE — 76536 US EXAM OF HEAD AND NECK: CPT

## 2018-07-13 PROCEDURE — A9517: CPT

## 2018-08-15 ENCOUNTER — APPOINTMENT (OUTPATIENT)
Dept: ENDOCRINOLOGY | Facility: CLINIC | Age: 53
End: 2018-08-15
Payer: MEDICAID

## 2018-08-15 VITALS
HEIGHT: 67 IN | WEIGHT: 233 LBS | DIASTOLIC BLOOD PRESSURE: 81 MMHG | BODY MASS INDEX: 36.57 KG/M2 | HEART RATE: 83 BPM | SYSTOLIC BLOOD PRESSURE: 148 MMHG

## 2018-08-15 PROCEDURE — 99214 OFFICE O/P EST MOD 30 MIN: CPT | Mod: 25

## 2018-08-15 PROCEDURE — 82947 ASSAY GLUCOSE BLOOD QUANT: CPT | Mod: QW

## 2018-08-16 LAB
GLUCOSE BLDC GLUCOMTR-MCNC: 106
HBA1C MFR BLD HPLC: 7.3 %
TSH SERPL-ACNC: 0.22 UIU/ML
TTG IGA SER IA-ACNC: <5 UNITS
TTG IGA SER-ACNC: NEGATIVE
TTG IGG SER IA-ACNC: 6 UNITS
TTG IGG SER IA-ACNC: NEGATIVE

## 2018-09-25 ENCOUNTER — APPOINTMENT (OUTPATIENT)
Dept: ENDOCRINOLOGY | Facility: CLINIC | Age: 53
End: 2018-09-25
Payer: MEDICAID

## 2018-09-25 ENCOUNTER — LABORATORY RESULT (OUTPATIENT)
Age: 53
End: 2018-09-25

## 2018-09-25 ENCOUNTER — RESULT CHARGE (OUTPATIENT)
Age: 53
End: 2018-09-25

## 2018-09-25 VITALS
BODY MASS INDEX: 36.73 KG/M2 | HEIGHT: 67 IN | SYSTOLIC BLOOD PRESSURE: 163 MMHG | WEIGHT: 234 LBS | HEART RATE: 92 BPM | DIASTOLIC BLOOD PRESSURE: 96 MMHG

## 2018-09-25 LAB — GLUCOSE BLDC GLUCOMTR-MCNC: 131

## 2018-09-25 PROCEDURE — 99214 OFFICE O/P EST MOD 30 MIN: CPT | Mod: 25

## 2018-09-25 PROCEDURE — 82947 ASSAY GLUCOSE BLOOD QUANT: CPT | Mod: QW

## 2018-09-25 RX ORDER — LEVOTHYROXINE SODIUM 0.2 MG/1
200 TABLET ORAL
Qty: 30 | Refills: 0 | Status: DISCONTINUED | COMMUNITY
Start: 2017-04-03 | End: 2018-09-25

## 2018-09-27 LAB
ALBUMIN SERPL ELPH-MCNC: 4.2 G/DL
ALP BLD-CCNC: 50 U/L
ALT SERPL-CCNC: 18 U/L
ANION GAP SERPL CALC-SCNC: 13 MMOL/L
AST SERPL-CCNC: 20 U/L
BILIRUB SERPL-MCNC: 0.3 MG/DL
BUN SERPL-MCNC: 9 MG/DL
CALCIUM SERPL-MCNC: 9.5 MG/DL
CHLORIDE SERPL-SCNC: 101 MMOL/L
CHOLEST SERPL-MCNC: 189 MG/DL
CHOLEST/HDLC SERPL: 3.3 RATIO
CO2 SERPL-SCNC: 26 MMOL/L
CREAT SERPL-MCNC: 0.92 MG/DL
GLUCOSE SERPL-MCNC: 146 MG/DL
HDLC SERPL-MCNC: 58 MG/DL
LDLC SERPL CALC-MCNC: 106 MG/DL
POTASSIUM SERPL-SCNC: 4 MMOL/L
PROT SERPL-MCNC: 7.8 G/DL
SODIUM SERPL-SCNC: 140 MMOL/L
THYROGLOB AB SERPL IA-ACNC: <1.8 IU/ML
TRIGL SERPL-MCNC: 126 MG/DL
TSH SERPL-ACNC: 4.87 UIU/ML

## 2018-10-04 ENCOUNTER — RX RENEWAL (OUTPATIENT)
Age: 53
End: 2018-10-04

## 2018-11-13 ENCOUNTER — APPOINTMENT (OUTPATIENT)
Dept: ENDOCRINOLOGY | Facility: CLINIC | Age: 53
End: 2018-11-13

## 2018-12-27 ENCOUNTER — APPOINTMENT (OUTPATIENT)
Dept: ENDOCRINOLOGY | Facility: CLINIC | Age: 53
End: 2018-12-27
Payer: MEDICAID

## 2018-12-27 ENCOUNTER — LABORATORY RESULT (OUTPATIENT)
Age: 53
End: 2018-12-27

## 2018-12-27 VITALS
HEIGHT: 67 IN | WEIGHT: 238 LBS | BODY MASS INDEX: 37.35 KG/M2 | HEART RATE: 79 BPM | DIASTOLIC BLOOD PRESSURE: 87 MMHG | SYSTOLIC BLOOD PRESSURE: 159 MMHG

## 2018-12-27 LAB
GLUCOSE BLDC GLUCOMTR-MCNC: 110
HBA1C MFR BLD HPLC: 7.4

## 2018-12-27 PROCEDURE — G0008: CPT

## 2018-12-27 PROCEDURE — 82962 GLUCOSE BLOOD TEST: CPT

## 2018-12-27 PROCEDURE — 83036 HEMOGLOBIN GLYCOSYLATED A1C: CPT | Mod: QW

## 2018-12-27 PROCEDURE — 90686 IIV4 VACC NO PRSV 0.5 ML IM: CPT

## 2018-12-27 PROCEDURE — 99214 OFFICE O/P EST MOD 30 MIN: CPT | Mod: 25

## 2018-12-27 RX ORDER — EMPAGLIFLOZIN 25 MG/1
25 TABLET, FILM COATED ORAL DAILY
Qty: 90 | Refills: 3 | Status: ACTIVE | COMMUNITY
Start: 2018-09-25 | End: 1900-01-01

## 2019-01-07 LAB
25(OH)D3 SERPL-MCNC: 20.7 NG/ML
ALBUMIN SERPL ELPH-MCNC: 4.4 G/DL
ALP BLD-CCNC: 49 U/L
ALT SERPL-CCNC: 18 U/L
ANION GAP SERPL CALC-SCNC: 12 MMOL/L
AST SERPL-CCNC: 25 U/L
BILIRUB SERPL-MCNC: 0.2 MG/DL
BUN SERPL-MCNC: 12 MG/DL
CALCIUM SERPL-MCNC: 10.1 MG/DL
CHLORIDE SERPL-SCNC: 99 MMOL/L
CHOLEST SERPL-MCNC: 211 MG/DL
CHOLEST/HDLC SERPL: 3.3 RATIO
CO2 SERPL-SCNC: 28 MMOL/L
CREAT SERPL-MCNC: 0.93 MG/DL
GLUCOSE SERPL-MCNC: 106 MG/DL
HDLC SERPL-MCNC: 64 MG/DL
LDLC SERPL CALC-MCNC: 126 MG/DL
POTASSIUM SERPL-SCNC: 4.2 MMOL/L
PROT SERPL-MCNC: 8.2 G/DL
SODIUM SERPL-SCNC: 139 MMOL/L
THYROGLOB AB SERPL IA-ACNC: <1.8 IU/ML
TRIGL SERPL-MCNC: 105 MG/DL
TSH SERPL-ACNC: 24.89 UIU/ML

## 2019-01-11 ENCOUNTER — RX RENEWAL (OUTPATIENT)
Age: 54
End: 2019-01-11

## 2019-03-28 ENCOUNTER — APPOINTMENT (OUTPATIENT)
Dept: ENDOCRINOLOGY | Facility: CLINIC | Age: 54
End: 2019-03-28
Payer: MEDICAID

## 2019-03-28 VITALS
DIASTOLIC BLOOD PRESSURE: 87 MMHG | WEIGHT: 237 LBS | SYSTOLIC BLOOD PRESSURE: 153 MMHG | BODY MASS INDEX: 37.12 KG/M2 | HEART RATE: 99 BPM

## 2019-03-28 LAB
GLUCOSE BLDC GLUCOMTR-MCNC: 153
HBA1C MFR BLD HPLC: 7.5

## 2019-03-28 PROCEDURE — 99214 OFFICE O/P EST MOD 30 MIN: CPT | Mod: 25

## 2019-03-28 PROCEDURE — 82962 GLUCOSE BLOOD TEST: CPT

## 2019-03-28 PROCEDURE — 83036 HEMOGLOBIN GLYCOSYLATED A1C: CPT | Mod: QW

## 2019-03-29 ENCOUNTER — RX RENEWAL (OUTPATIENT)
Age: 54
End: 2019-03-29

## 2019-03-29 LAB
ALBUMIN SERPL ELPH-MCNC: 4.4 G/DL
ALP BLD-CCNC: 55 U/L
ALT SERPL-CCNC: 25 U/L
ANION GAP SERPL CALC-SCNC: 12 MMOL/L
AST SERPL-CCNC: 23 U/L
BILIRUB SERPL-MCNC: 0.2 MG/DL
BUN SERPL-MCNC: 15 MG/DL
CALCIUM SERPL-MCNC: 9.7 MG/DL
CHLORIDE SERPL-SCNC: 100 MMOL/L
CHOLEST SERPL-MCNC: 187 MG/DL
CHOLEST/HDLC SERPL: 3.3 RATIO
CO2 SERPL-SCNC: 27 MMOL/L
CREAT SERPL-MCNC: 1.1 MG/DL
GLUCOSE SERPL-MCNC: 146 MG/DL
HDLC SERPL-MCNC: 57 MG/DL
LDLC SERPL CALC-MCNC: 109 MG/DL
POTASSIUM SERPL-SCNC: 4.5 MMOL/L
PROT SERPL-MCNC: 7.9 G/DL
SODIUM SERPL-SCNC: 139 MMOL/L
TRIGL SERPL-MCNC: 106 MG/DL
TSH SERPL-ACNC: 0.63 UIU/ML

## 2019-03-29 RX ORDER — ROSUVASTATIN CALCIUM 40 MG/1
40 TABLET, FILM COATED ORAL DAILY
Qty: 90 | Refills: 3 | Status: ACTIVE | COMMUNITY
Start: 2018-08-15 | End: 1900-01-01

## 2019-03-29 NOTE — ASSESSMENT
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 Stage 1, T3N0M0 per pathology. She is status post total thyroidectomy in March 2017. She was unaware of her pathology until her initial visit here, significant for: papillary thyroid carcinoma, multifocal (3 foci); follicular variant of papillary carcinoma, infiltrative,  0.5 cm focus in right lobe upper pole with limited extrathyroidal extension and positive margins; follicular variant of papillary carcinoma, two foci in  left lobe, confined to the thyroid, a 1.3 cm circumscribed nodule  in mid pole, and a 0.5 cm infiltrative focus in lower pole; no positive lymph nodes. She is status post I-131 30 mCi in July 2018. We subsequently adjusted her dose of levothyroxine. Thyroglobulin trending down with negative antibodies.\par Continue levothyroxine 250 mcg daily pending TSH for goal 0.5-2.0 uIU/mL\par Check neck ultrasound in June 2019\par \par Type 2 diabetes mellitus. Point-of-care HbA1c 7.5% with blood glucose 153 mg/dL today; HbA1c 7.3% in August. No known history of micro- or macrovascular complications. We discussed adjusting metformin to 1000 mg twice daily instead of 1500 mg at dinner; she will let me know if tolerated.\par Adjust metformin ER to 1000 mg twice daily\par Continue Jardiance to 25 mg daily\par She is on aspirin\par She is on a blood pressure regimen with lisinopril; adjust lisinopril to 30 mg daily since blood pressure not at goal\par She is on a statin for cholesterol; check lipid panel and comprehensive metabolic panel today for monitoring\par Last ophthalmology appointment: August 2018, no evidence of diabetic retinopathy\par Last podiatrist appointment: October 2018\par Last dental appointment: January 2018 and again advised appointment\par She is up-to-date with influenza vaccine\par \par Vitamin D deficiency. We started ergocalciferol 50,000 intl units weekly in June, adjusted to every other week in September but vitamin D was not at goal. She did not receive additional vitamin D from her pharmacy.\par Restart ergocalciferol 50,000 intl weekly\par \par Return to clinic in 3 months.\par \par CC:\par Dr. Radha Sarah, Fax [phone]

## 2019-03-29 NOTE — PHYSICAL EXAM
[Alert] : alert [No Acute Distress] : no acute distress [Healthy Appearance] : healthy appearance [Normal Sclera/Conjunctiva] : normal sclera/conjunctiva [Normal Oropharynx] : the oropharynx was normal [No Neck Mass] : no neck mass was observed [Supple] : the neck was supple [No LAD] : no lymphadenopathy [Well Healed Scar] : well healed scar [Normal Rate and Effort] : normal respiratory rhythm and effort [Clear to Auscultation] : lungs were clear to auscultation bilaterally [Normal Rate] : heart rate was normal  [Normal S1, S2] : normal S1 and S2 [Regular Rhythm] : with a regular rhythm [No Edema] : there was no peripheral edema [No Stigmata of Cushings Syndrome] : no stigmata of cushings syndrome [Normal Gait] : normal gait [Acanthosis Nigricans] : acanthosis nigricans [Normal Insight/Judgement] : insight and judgment were intact [Kyphosis] : no kyphosis present [de-identified] : no palpable thyroid tissue

## 2019-03-29 NOTE — ADDENDUM
[FreeTextEntry1] : Recent test results as below; discussed with Ms. Gaines. TSH at goal and refills sent for levothyroxine 275 mcg. LDL not at goal and will adjust rosuvastatin to 40 mg daily. 3/29/19

## 2019-03-29 NOTE — HISTORY OF PRESENT ILLNESS
[FreeTextEntry1] : Ms. Gaines is a 54 year-old woman with a history of papillary/follicular thyroid cancer with postsurgical hypothyroidism, type 2 diabetes mellitus, history of bronchiolitis obliterans organizing pneumonia presenting for follow-up of her endocrine disease. 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 She did not follow-up for review of pathology and was unaware of her diagnosis of thyroid cancer. Review of pathology findings remarkable for: - Papillary thyroid carcinoma, multifocal (3 foci). - Follicular variant of papillary carcinoma, infiltrative,  0.5 cm focus in right lobe upper pole. - Tumor exhibits limited extrathyroidal extension, and involves inked margins at superior pole. - Follicular variant of papillary carcinoma, two foci in  left lobe, confined to the thyroid, a 1.3 cm circumscribed nodule  in mid pole, and a 0.5 cm infiltrative focus in lower pole. - Five central compartment lymph nodes, negative for  metastasis (0/5).\par She is status post I-131 30 mCi in July 2018. Posttreatment scan of the neck and chest showed activity in the neck but no evidence of metastatic disease.\par She was started on levothyroxine 75 mcg daily postoperatively. She was taking 200 mcg daily at the time of her initial visit. We increased her dose to 250 mcg daily after her I-131 therapy. Celiac antibodies were checked due to high levothyroxine requirements and were negative.\par In August 2018 we adjusted her dose of levothyroxine to levothyroxine 250 mcg 6 days/week and 200 mcg 1 day/week.\par In September 2018 we adjusted her dose of levothyroxine to levothyroxine 250 mcg daily.\par She is taking levothyroxine in the morning, on an empty stomach, with plain water, and waiting at least 30 minutes before eating. She is not taking calcium/iron/multivitamin.\par Niece with history of goiter. No other family history of thyroid disease.\par \par Type 2 diabetes mellitus. Point-of-care HbA1c 7.5% with blood glucose 153 mg/dL today; HbA1c 7.4% in August. No known history of micro- or macrovascular complications.\par She was diagnosed with diabetes around 2013.\par She was taking metformin 500 mg daily at the time of her initial visit. In June we adjusted her dose up to 1500 mg daily. She did not tolerate 2000 mg daily.\par In September 2018 we started Jardiance 10 mg daily, adjusted to 25 mg daily in December\par She is not checking blood sugars\par She is on aspirin\par She is on a blood pressure regimen with lisinopril\par She is on a statin for cholesterol\par Last ophthalmology appointment: August 2018, no evidence of diabetic retinopathy\par Last podiatrist appointment: October 2018\par Last dental appointment: January 2018\par She is up-to-date with influenza vaccine\par \par Interim History \par Laboratory tests from last visit as below. Last visit we adjusted levothyroxine and Jardiance. Thyroglobulin trending down. We adjusted rosuvastatin. We restarted weekly vitamin D 50,000 intl units; the pharmacy did not provider her with the prescription.\par She states she is taking metformin only every other day.\par She feels well today and is without complaints. She denies fixed/hard neck mass, chest pain, shortness of breath, lower extremity numbness/tingling. Review of systems otherwise negative. Her daughter is due with a baby girl soon; she has a 12 year-old grandson. \par Medical and surgical history, medications, allergies, social and family history reviewed and updated as needed.

## 2019-03-29 NOTE — DATA REVIEWED
[FreeTextEntry1] : Post-ablation I-131 scan (July 13, 2018) reviewed and significant for:\par Findings: There is intense activity in the patient's thyroid remnant  which consists of 2 focal areas of activity, one apparently central in  the neck and the other on the left side somewhat more superiorly.  No other activity is identified.  Impression: Ablation of a thyroid remnant. No evidence of metastatic  disease. \par \par Pathology from total thyroidectomy (March 29, 2017) reviewed and significant for:\par 1. Rule out left inferior parathyroid adenoma (FS): - Enlarged hypercellular parathyroid, adherent to thymic tissue (see note).  Note: On permanent sections the parathyroid exhibits intermixed parenchymal fat, such that both hyperplasia and adenoma are in the differential.  2. Right inferior parathyroid, biopsy (FS): - Minute fragment of parathyroid tissue, unremarkable.  3. Thyroid gland, total thyroidectomy: - Papillary thyroid carcinoma, multifocal (3 foci). - Follicular variant of papillary carcinoma, infiltrative,  0.5 cm focus in right lobe upper pole. - Tumor exhibits limited extrathyroidal extension, and  involves inked margins at superior pole. - Follicular variant of papillary carcinoma, two foci in  left lobe, confined to the thyroid, a 1.3 cm circumscribed nodule in mid pole, and a 0.5 cm infiltrative focus in lower pole. - Five central compartment lymph nodes, negative for  metastasis (0/5). - See Synoptic Report.\par Tumor sizes: - Right lobe: 0.5 cm (with extrathyroidal extension) - Left lobe:   1.3 cm and 0.5 cm (confined to thyroid) Histologic type: - Papillary thyroid carcinoma, follicular variant (2 tumors infiltrative, 1 circumscribed) Margins: - Margins involved by carcinoma (right lobe superior pole) Lymphatic invasion: - Not identified Perineural invasion: - Not identified Extrathyroidal extension: - Present; extent - limited (right lobe superior pole)  Pathologic Staging (pTNM): pT(m)3, pN0  Regional lymph nodes: Central compartment dissection -  Number of lymph nodes examined: 5 -  Number of lymph nodes involved:   0  Additional pathologic findings: - Enlarged hypercellular parathyroid and thymic tissue, left inferior

## 2019-07-30 NOTE — ASU PATIENT PROFILE, ADULT - BRADEN SCORE (IF 18 OR LESS ACTIVATE SKIN INJURY RISK INCREASED GUIDELINE), MLM
Detail Level: Zone
Quality 226: Preventive Care And Screening: Tobacco Use: Screening And Cessation Intervention: Patient screened for tobacco use and is an ex/non-smoker
23

## 2019-08-05 ENCOUNTER — RX RENEWAL (OUTPATIENT)
Age: 54
End: 2019-08-05

## 2019-10-07 ENCOUNTER — RX RENEWAL (OUTPATIENT)
Age: 54
End: 2019-10-07

## 2019-10-07 RX ORDER — LISINOPRIL 30 MG/1
30 TABLET ORAL DAILY
Qty: 90 | Refills: 1 | Status: ACTIVE | COMMUNITY
Start: 2019-10-07 | End: 1900-01-01

## 2019-11-13 ENCOUNTER — RX RENEWAL (OUTPATIENT)
Age: 54
End: 2019-11-13

## 2019-11-25 ENCOUNTER — RX RENEWAL (OUTPATIENT)
Age: 54
End: 2019-11-25

## 2020-02-26 ENCOUNTER — RX RENEWAL (OUTPATIENT)
Age: 55
End: 2020-02-26

## 2020-03-09 ENCOUNTER — RX RENEWAL (OUTPATIENT)
Age: 55
End: 2020-03-09

## 2020-05-06 ENCOUNTER — RX RENEWAL (OUTPATIENT)
Age: 55
End: 2020-05-06

## 2022-09-19 ENCOUNTER — APPOINTMENT (OUTPATIENT)
Dept: ENDOCRINOLOGY | Facility: CLINIC | Age: 57
End: 2022-09-19

## 2022-09-19 VITALS
DIASTOLIC BLOOD PRESSURE: 78 MMHG | HEIGHT: 67 IN | WEIGHT: 228 LBS | BODY MASS INDEX: 35.79 KG/M2 | HEART RATE: 77 BPM | SYSTOLIC BLOOD PRESSURE: 135 MMHG

## 2022-09-19 LAB
GLUCOSE BLDC GLUCOMTR-MCNC: 140
HBA1C MFR BLD HPLC: 7.9

## 2022-09-19 PROCEDURE — 83036 HEMOGLOBIN GLYCOSYLATED A1C: CPT | Mod: QW

## 2022-09-19 PROCEDURE — 82962 GLUCOSE BLOOD TEST: CPT

## 2022-09-19 PROCEDURE — 99204 OFFICE O/P NEW MOD 45 MIN: CPT | Mod: 25

## 2022-09-19 RX ORDER — CHOLECALCIFEROL (VITAMIN D3) 1250 MCG
1.25 MG CAPSULE ORAL
Qty: 12 | Refills: 3 | Status: DISCONTINUED | COMMUNITY
Start: 2018-06-29 | End: 2022-09-19

## 2022-09-19 RX ORDER — BLOOD-GLUCOSE METER
W/DEVICE EACH MISCELLANEOUS
Qty: 1 | Refills: 0 | Status: ACTIVE | COMMUNITY
Start: 2022-09-19 | End: 1900-01-01

## 2022-09-19 RX ORDER — BLOOD SUGAR DIAGNOSTIC
STRIP MISCELLANEOUS
Qty: 100 | Refills: 3 | Status: ACTIVE | COMMUNITY
Start: 2022-09-19 | End: 1900-01-01

## 2022-09-19 RX ORDER — LANCETS
EACH MISCELLANEOUS
Qty: 100 | Refills: 3 | Status: ACTIVE | COMMUNITY
Start: 2022-09-19 | End: 1900-01-01

## 2022-09-21 LAB
25(OH)D3 SERPL-MCNC: 16.2 NG/ML
ALBUMIN SERPL ELPH-MCNC: 4.5 G/DL
ALP BLD-CCNC: 65 U/L
ALT SERPL-CCNC: 24 U/L
ANION GAP SERPL CALC-SCNC: 13 MMOL/L
AST SERPL-CCNC: 23 U/L
BILIRUB SERPL-MCNC: 0.2 MG/DL
BUN SERPL-MCNC: 15 MG/DL
CALCIUM SERPL-MCNC: 9.6 MG/DL
CHLORIDE SERPL-SCNC: 101 MMOL/L
CHOLEST SERPL-MCNC: 140 MG/DL
CO2 SERPL-SCNC: 26 MMOL/L
CREAT SERPL-MCNC: 1 MG/DL
CREAT SPEC-SCNC: 179 MG/DL
EGFR: 67 ML/MIN/1.73M2
ESTIMATED AVERAGE GLUCOSE: 189 MG/DL
GLUCOSE SERPL-MCNC: 130 MG/DL
HBA1C MFR BLD HPLC: 8.2 %
HDLC SERPL-MCNC: 56 MG/DL
LDLC SERPL CALC-MCNC: 67 MG/DL
MICROALBUMIN 24H UR DL<=1MG/L-MCNC: 18.5 MG/DL
MICROALBUMIN/CREAT 24H UR-RTO: 104 MG/G
NONHDLC SERPL-MCNC: 84 MG/DL
POTASSIUM SERPL-SCNC: 5 MMOL/L
PROT SERPL-MCNC: 8 G/DL
SODIUM SERPL-SCNC: 139 MMOL/L
T4 FREE SERPL-MCNC: 1.6 NG/DL
THYROGLOB AB SERPL-ACNC: <20 IU/ML
THYROGLOB SERPL-MCNC: <0.2 NG/ML
TRIGL SERPL-MCNC: 83 MG/DL
TSH SERPL-ACNC: 0.63 UIU/ML
VIT B12 SERPL-MCNC: 996 PG/ML

## 2022-09-21 RX ORDER — METFORMIN ER 500 MG 500 MG/1
500 TABLET ORAL DAILY
Qty: 360 | Refills: 1 | Status: ACTIVE | COMMUNITY
Start: 2018-06-26 | End: 1900-01-01

## 2022-09-21 NOTE — PHYSICAL EXAM
[Alert] : alert [Well Nourished] : well nourished [No Acute Distress] : no acute distress [Normal Sclera/Conjunctiva] : normal sclera/conjunctiva [No Neck Mass] : no neck mass was observed [No Respiratory Distress] : no respiratory distress [Normal Rate and Effort] : normal respiratory rate and effort [Clear to Auscultation] : lungs were clear to auscultation bilaterally [Normal PMI] : the apical impulse was normal [Normal S1, S2] : normal S1 and S2 [Regular Rhythm] : with a regular rhythm [Normal Gait] : normal gait [Oriented x3] : oriented to person, place, and time [Normal Affect] : the affect was normal [Normal Insight/Judgement] : insight and judgment were intact [Normal Mood] : the mood was normal [de-identified] : No palpable thyroid gland

## 2022-09-21 NOTE — ADDENDUM
[FreeTextEntry1] : 9/21/22, addendum, SG--\par Called and discussed labs with patient\par TFTs at goal with NEG TG/Tg AB, continue current LT4 regimen and plan for repeat thyroid US\par A1C 8.2%,  continue plan from office visit, patient was previously tolerating ER formulation, now having GI SE on IR metformin formulation, causing patient to skip days of medication and worsening glycemic control.  RX for Metformin ER sent to pharmacy, continue plan to see nutritionist\par UCR positive, 104, improved from last UCR of 232, on lisinopril 30, if remains elevated with improvement of glycemic control, will increase lisinopril. Normal creat/GFR\par CMP WNL\par cholesterol at goal\par \par Continue plan for FU in 3 months, will re-establish with Dr. Dodd for this 3 month FU

## 2022-09-21 NOTE — HISTORY OF PRESENT ILLNESS
[FreeTextEntry1] : HENRY PERDOMO is a 57 year female with pmhx of T2D, postoperative hypothyroidism, papillary thyroid cancer (s/p total thyroidectomy 3/2017) who presents for T2D evaluation.\par \par Patient of Dr. Dodd, last visit >3 years ago, making her considered a new patient\par Saw PCP since last visit\par \par Referred back to endocrine r/t sugars "being up"\par Skips metformin (all of daily dose) intermittently r/t feeling like pills stuck/nausea.  Takes all 4 tablets at once in attempt to not forget dose later in the day.  Intermittent GI side effects too following dose, believes that she takes them predominantly fasting\par Endorses diet/portions as barrier to glycemic control, as well as metformin issue as above\par Open to seeing nutritionist \par \par \par A1C - 7.9% (9/19/22 on POCT) from 7.5 (3/2019)\par Office Fingerstick -- 140 (fasting)\par \par Current medication:\par - Metformin 500mg 4 tablets daily\par - Jardiance 25mg daily\par \par Past medication:\par - NONE\par \par HENRY reports they take their diabetes medication most of the time.  Feels like metformin gets stuck in throat.  Typically takes 4 \par HENRY denies hypoglycemia symptoms or BG <70\par \par Diabetes Self-Management:\par Glucometer: None\par No current glucometer\par \par Diet--\par Skips breakfast, can also skip lunch then resulting in large portion for dinner\par Beverages: water, rare soda\par \par Ophthalmology: early 2022\par Podiatry: pre-pandemic. Referral provided today\par \par Denies personal history of heart disease.\par Denies personal history of renal disease.\par \par 2-3. Postoperative hypothyroidism, h/o papillary thyroid cancer\par T3N0M0, per pathology: papillary thyroid carcinoma, multifocal (3 foci); follicular variant of papillary carcinoma, infiltrative, 0.5 cm focus in right lobe upper pole with limited extrathyroidal extension and positive margins; follicular variant of papillary carcinoma, two foci in left lobe, confined to the thyroid, a 1.3 cm circumscribed nodule in mid pole, and a 0.5 cm infiltrative focus in lower pole; no positive lymph nodes.\par S/p total thyroidectomy in 3/2017\par S/p I-131 30 mCi in July 2018\par \par Current regimen: Levothyroxine 275mcg QAM\par \par

## 2022-09-21 NOTE — ASSESSMENT
[FreeTextEntry1] : 1. T2D\par A1C goal <7%, more ideally, <6.5%\par A1C - 7.9% (9/19/22 on POCT) from 7.5 (3/2019)\par Current regimen: Metformin 500mg (4 tablets daily), Jardiance 25mg daily\par Glucometer readings at home reveal N/a\par Glucometer reading performed in office today is 140 (fasting\par \par Kaleigh endorses missing full day of metformin dosing intermittently r/t GI discomfort after taking medication.  Will review if she is still on ER formulation, if not, will send ER formulation.  Discussed taking metformin with food +/-  metformin dosing to mitigate GI effects, which she is open to.\par She endorses barrier to glycemic goal of skipping meals and then consuming large portions, open to nutritionist and diet modification\par -- referral to nutrition, diet modification discussed\par -- continue Metformin and Jardiance regimen, as above, if not at glycemic goal in 3 months, will discuss add-on therapy\par -- Glucometer RX sent to pharmacy to initiate BG monitoring\par \par Counseling provided regarding T2DM (physiology of T2DM, inclusive of pancreatic dysfunction, insulin resistance,), evaluation and management with lifestyle and pharmacologic approach.\par Counseling provided regarding the need for glycemic control to prevent symptomatic hypo/hyper-glycemia as well as to prevent future micro-/macro-vascular disease. \par Counseling provided regarding diabetic guidelines- diabetes care plan handout utilized as visual aid (monitoring of glycemic control, knowing numbers- BG, cholesterol, BP, annual diabetic eye and foot exam).\par \par \par Diabetes Health Care Maintenance\par · Opthalmology up to date: yes\par · Podiatry up to date: referral provided today\par · Antiplatelet agent:  yes\par · Urine microalbumin: ordered today, 9/19/22\par · ACE-I/ARB:  Yes\par · Patient to contact office for persistent glucose < 70 or > 300\par \par \par 2-3. Postoperative hypothyrodism, h/o papillary thyroid cancer\par T3N0M0, per pathology\par S/p total thyroidectomy in 3/2017\par S/p I-131 30 mCi in July 2018\par Current regimen: Levothyroxine 275mcg daily\par TSH for goal 0.5-2.0 uIU/mL\par Last ultrasound 2018 with no evidence of structural recurrence/metastatic disease\par -- labs today, including Tg\par -- referral for thyroid US provided \par \par 4. vitamin D deficiency\par No current supplement\par -- repeat with labs today\par \par I will call with labs and thyroid US results\par Follow-up in 3 months with myself or Dr Dodd\par \par Lucy Maldonado MS, FNP-BC, [facility]\par 09/19/2022

## 2022-09-21 NOTE — REVIEW OF SYSTEMS
[Nausea] : nausea [Negative] : Heme/Lymph [Dysphagia] : no dysphagia [Neck Pain] : no neck pain [Dysphonia] : no dysphonia

## 2022-10-04 LAB
CLINICAL BIOCHEMIST REVIEW: NORMAL
THYROGLOB SERPL-MCNC: <0.2 NG/ML

## 2022-10-24 ENCOUNTER — APPOINTMENT (OUTPATIENT)
Dept: ENDOCRINOLOGY | Facility: CLINIC | Age: 57
End: 2022-10-24

## 2022-12-19 ENCOUNTER — APPOINTMENT (OUTPATIENT)
Dept: ENDOCRINOLOGY | Facility: CLINIC | Age: 57
End: 2022-12-19

## 2023-03-08 ENCOUNTER — NON-APPOINTMENT (OUTPATIENT)
Age: 58
End: 2023-03-08

## 2023-03-13 ENCOUNTER — LABORATORY RESULT (OUTPATIENT)
Age: 58
End: 2023-03-13

## 2023-03-13 ENCOUNTER — APPOINTMENT (OUTPATIENT)
Dept: BREAST CENTER | Facility: CLINIC | Age: 58
End: 2023-03-13
Payer: MEDICAID

## 2023-03-13 ENCOUNTER — APPOINTMENT (OUTPATIENT)
Dept: HEMATOLOGY ONCOLOGY | Facility: CLINIC | Age: 58
End: 2023-03-13
Payer: MEDICAID

## 2023-03-13 ENCOUNTER — NON-APPOINTMENT (OUTPATIENT)
Age: 58
End: 2023-03-13

## 2023-03-13 VITALS
HEIGHT: 67 IN | DIASTOLIC BLOOD PRESSURE: 83 MMHG | SYSTOLIC BLOOD PRESSURE: 135 MMHG | HEART RATE: 92 BPM | WEIGHT: 226 LBS | BODY MASS INDEX: 35.47 KG/M2

## 2023-03-13 DIAGNOSIS — Z86.39 PERSONAL HISTORY OF OTHER ENDOCRINE, NUTRITIONAL AND METABOLIC DISEASE: ICD-10-CM

## 2023-03-13 DIAGNOSIS — Z86.79 PERSONAL HISTORY OF OTHER DISEASES OF THE CIRCULATORY SYSTEM: ICD-10-CM

## 2023-03-13 PROCEDURE — 99205 OFFICE O/P NEW HI 60 MIN: CPT | Mod: 25

## 2023-03-13 PROCEDURE — 99205 OFFICE O/P NEW HI 60 MIN: CPT

## 2023-03-13 PROCEDURE — 36415 COLL VENOUS BLD VENIPUNCTURE: CPT

## 2023-03-13 NOTE — PHYSICAL EXAM
[de-identified] : R palpable 3cm hard mass 10:00 c/w known malignancy.  No adenopathy palpated [de-identified] : L breast/axilla/supraclavicular area: No masses, discharge, or adenopathy\par

## 2023-03-13 NOTE — HISTORY OF PRESENT ILLNESS
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 \par \par Patient states she first palpated the right breast mass sometime in 2022 which prompted her to seek medical care. Patient's PCP Dr. Radha Sarah sent patient for imaging which yielded a 2.1cm lobulated heterogeneous mass on sonogram 10:00 10FN. She has no prior hx of breast surgery or bx. Denies family history of breast or ovarian cancer. Patient denies skin changes or nipple discharge bilaterally. \par \par 22: B/l MG ([facility])- heterogeneously dense. ROCKY. BI-RADS 2\par 23: R MG & B/l US (R palpable mass)- heterogenously dense. R 2.6 cm mass w/ heterogeneous calcs UOQ (palpable). US- R 2.1cm lobulated heterogeneous mass w/ calcs 10:00 10FN (palpable- rec US bx). L 0.6cm cyst 5:00 6FN. BI-RADS 4\par 23: R US bx 2.3cm mass 10:00 10FN (heart clip)- IDC (poorly differentiated), ER- (0%), VT- (0%), HER2- (0). Concordant- recommend definitive surgical and oncological management. \par \par OBhx:\par Menarche 15yo\par LMP s/p partial hysterectomy at age 47yo\par  did not breastfeed, age at first birth 18yo. \par Denies h/o fertility treatments, denies h/o HRT and OCP [de-identified] : Patient denies any weight loss, back pain and headache. Reports only palpable right breast mass w/o any other breast related complaints.

## 2023-03-13 NOTE — ASSESSMENT
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 \par \par Patient states she first palpated the right breast mass sometime in February 2022 which prompted her to seek medical care. Patient's PCP Dr. Radha Sarah sent patient for imaging which yielded a 2.1cm lobulated heterogeneous mass on sonogram 10:00 10FN. She has no prior hx of breast surgery or bx. Denies family history of breast or ovarian cancer. Patient denies skin changes or nipple discharge bilaterally. \par \par Reviewed clinical utility of neoadjuvant chemoimmunotherapy as per the Keynote 522 clinical trial - recommending starting weekly carbo/taxol x12 followed by adriamycin/cytoxan every 2 weeks with continued pembrolizumab every 6 weeks throughout duration of chemotherapy IV through chemoport with growth factor support (Neulasta onbody injector) after AC. \par Reviewed potential side effects of chemotherapy including but not limited to allergic reactions/infusion reactions, bone marrow suppression leading to fatigue and increased risk of infection and febrile neutropenia,anemia and thrombocytopenia needing supportive transfusions,hair loss, skin/nail changes, nausea/vomiting, stomatitis, abdominal pain, diarrhea/constipation, kidney and liver dysfunction with electrolyte disturbances, peripheral neuropathy, small risk of cardiomyopathy and secondary malignancies (ie hematologic malignancies due to chemotherapy). Also discussed potential side effects of immunotherapy (pembro) including inflammation of colon leading to colitis, inflammation of lining of lung or heart for example or other organs leading to pleuritis and pericarditis respectively, also other possible conditions such as hypophysitis or pancreatitis; reviewed treatment in such a case would involve stopping of the offending agent and initiating steroids. \par \par Reviewed need for Echo and chmemoport placement by IR. \par Also to check hepatitis panel and HIV before chemo today. \par \par Dexamethasone 20mg/night before each Taxol treatment. \par Compazine 10mg PO Q6hrs as needed for nausea/vomiting. \par \par \par RTC in 2 weeks to start treatment.

## 2023-03-13 NOTE — PAST MEDICAL HISTORY
[Surgical Menopause] : The patient is in surgical menopause [Menarche Age ____] : age at menarche was [unfilled] [Menopause Age____] : age at menopause was [unfilled] [Total Preg ___] : G[unfilled] [Live Births ___] : P[unfilled]  [Age At Live Birth ___] : Age at live birth: [unfilled] [History of Hormone Replacement Treatment] : has no history of hormone replacement treatment [de-identified] : partial hyserectomy  [FreeTextEntry6] : no [FreeTextEntry7] : no [FreeTextEntry8] : no

## 2023-03-13 NOTE — PHYSICAL EXAM
[Fully active, able to carry on all pre-disease performance without restriction] : Status 0 - Fully active, able to carry on all pre-disease performance without restriction [Normal] : affect appropriate [de-identified] : about 3cm palpable hard mass in right breast ~10 clock position 10cm from nipple, no palpable axillary nodes

## 2023-03-13 NOTE — HISTORY OF PRESENT ILLNESS
[FreeTextEntry1] : Patient is a 57yo F who presents today for initial evaluation of R US bx proven IDC (triple negative) palpated by patient 2/2023. Patient f/u with PCP Dr. Radha Sarah who sent patient for imaging which yielded a  2.1cm lobulated heterogeneous mass w/ calcs 10:00 10FN. She has no prior hx of breast surgery or bx. Denies family history of breast or ovarian cancer. Patient denies skin changes or nipple discharge bilaterally. Of note, patient has hx of papillary thyroid cancer treated w/ total thyroidectomy 3/2017.\par \par 5/23/22: B/l MG ([facility])- heterogeneously dense. ROCKY. BI-RADS 2\par 2/7/23: R MG & B/l US (R palpable mass)- heterogenously dense. R 2.6 cm mass w/ heterogeneous calcs UOQ (palpable). US- R 2.1cm lobulated heterogeneous mass w/ calcs 10:00 10FN (palpable- rec US bx). L 0.6cm cyst 5:00 6FN. BI-RADS 4\par 2/27/23: R US bx 2.3cm mass 10:00 10FN (heart clip)- IDC (poorly differentiated), ER- (0%), AR- (0%), HER2- (0). Concordant- recommend definitive surgical and oncological management.

## 2023-03-15 LAB
BASOPHILS # BLD AUTO: 0.06 K/UL
BASOPHILS NFR BLD AUTO: 1.2 %
EOSINOPHIL # BLD AUTO: 0.08 K/UL
EOSINOPHIL NFR BLD AUTO: 1.6 %
HCT VFR BLD CALC: 41.3 %
HGB BLD-MCNC: 12.3 G/DL
IMM GRANULOCYTES NFR BLD AUTO: 0.2 %
LYMPHOCYTES # BLD AUTO: 2.21 K/UL
LYMPHOCYTES NFR BLD AUTO: 43.4 %
MAN DIFF?: NORMAL
MCHC RBC-ENTMCNC: 26.6 PG
MCHC RBC-ENTMCNC: 29.8 GM/DL
MCV RBC AUTO: 89.2 FL
MONOCYTES # BLD AUTO: 0.29 K/UL
MONOCYTES NFR BLD AUTO: 5.7 %
NEUTROPHILS # BLD AUTO: 2.44 K/UL
NEUTROPHILS NFR BLD AUTO: 47.9 %
PLATELET # BLD AUTO: 319 K/UL
RBC # BLD: 4.63 M/UL
RBC # FLD: 13.5 %
WBC # FLD AUTO: 5.09 K/UL

## 2023-03-16 ENCOUNTER — NON-APPOINTMENT (OUTPATIENT)
Age: 58
End: 2023-03-16

## 2023-03-16 ENCOUNTER — APPOINTMENT (OUTPATIENT)
Dept: INTERVENTIONAL RADIOLOGY/VASCULAR | Facility: HOSPITAL | Age: 58
End: 2023-03-16

## 2023-03-16 ENCOUNTER — RESULT REVIEW (OUTPATIENT)
Age: 58
End: 2023-03-16

## 2023-03-16 PROCEDURE — 88321 CONSLTJ&REPRT SLD PREP ELSWR: CPT

## 2023-03-17 ENCOUNTER — OUTPATIENT (OUTPATIENT)
Dept: OUTPATIENT SERVICES | Facility: HOSPITAL | Age: 58
LOS: 1 days | End: 2023-03-17
Payer: MEDICAID

## 2023-03-17 DIAGNOSIS — Z98.890 OTHER SPECIFIED POSTPROCEDURAL STATES: Chronic | ICD-10-CM

## 2023-03-17 DIAGNOSIS — C50.919 MALIGNANT NEOPLASM OF UNSPECIFIED SITE OF UNSPECIFIED FEMALE BREAST: ICD-10-CM

## 2023-03-17 DIAGNOSIS — Z90.710 ACQUIRED ABSENCE OF BOTH CERVIX AND UTERUS: Chronic | ICD-10-CM

## 2023-03-17 DIAGNOSIS — Z98.891 HISTORY OF UTERINE SCAR FROM PREVIOUS SURGERY: Chronic | ICD-10-CM

## 2023-03-17 PROCEDURE — 88321 CONSLTJ&REPRT SLD PREP ELSWR: CPT

## 2023-03-20 ENCOUNTER — APPOINTMENT (OUTPATIENT)
Dept: INTERVENTIONAL RADIOLOGY/VASCULAR | Facility: HOSPITAL | Age: 58
End: 2023-03-20
Payer: MEDICAID

## 2023-03-20 ENCOUNTER — NON-APPOINTMENT (OUTPATIENT)
Age: 58
End: 2023-03-20

## 2023-03-20 ENCOUNTER — RESULT REVIEW (OUTPATIENT)
Age: 58
End: 2023-03-20

## 2023-03-20 ENCOUNTER — OUTPATIENT (OUTPATIENT)
Dept: OUTPATIENT SERVICES | Facility: HOSPITAL | Age: 58
LOS: 1 days | End: 2023-03-20
Payer: MEDICAID

## 2023-03-20 DIAGNOSIS — Z98.890 OTHER SPECIFIED POSTPROCEDURAL STATES: Chronic | ICD-10-CM

## 2023-03-20 DIAGNOSIS — Z90.710 ACQUIRED ABSENCE OF BOTH CERVIX AND UTERUS: Chronic | ICD-10-CM

## 2023-03-20 DIAGNOSIS — Z98.891 HISTORY OF UTERINE SCAR FROM PREVIOUS SURGERY: Chronic | ICD-10-CM

## 2023-03-20 LAB — SURGICAL PATHOLOGY STUDY: SIGNIFICANT CHANGE UP

## 2023-03-20 PROCEDURE — C1788: CPT

## 2023-03-20 PROCEDURE — 36561 INSERT TUNNELED CV CATH: CPT

## 2023-03-20 PROCEDURE — 76937 US GUIDE VASCULAR ACCESS: CPT | Mod: 26

## 2023-03-20 PROCEDURE — 77001 FLUOROGUIDE FOR VEIN DEVICE: CPT

## 2023-03-20 PROCEDURE — 76937 US GUIDE VASCULAR ACCESS: CPT

## 2023-03-20 PROCEDURE — 77001 FLUOROGUIDE FOR VEIN DEVICE: CPT | Mod: 26

## 2023-03-20 PROCEDURE — C1769: CPT

## 2023-03-20 PROCEDURE — C1887: CPT

## 2023-03-23 ENCOUNTER — NON-APPOINTMENT (OUTPATIENT)
Age: 58
End: 2023-03-23

## 2023-03-24 ENCOUNTER — NON-APPOINTMENT (OUTPATIENT)
Age: 58
End: 2023-03-24

## 2023-03-24 RX ORDER — DEXAMETHASONE 4 MG/1
4 TABLET ORAL
Qty: 60 | Refills: 0 | Status: DISCONTINUED | COMMUNITY
Start: 2023-03-14 | End: 2023-03-24

## 2023-03-27 ENCOUNTER — LABORATORY RESULT (OUTPATIENT)
Age: 58
End: 2023-03-27

## 2023-03-27 ENCOUNTER — OUTPATIENT (OUTPATIENT)
Dept: OUTPATIENT SERVICES | Facility: HOSPITAL | Age: 58
LOS: 1 days | End: 2023-03-27
Payer: MEDICAID

## 2023-03-27 ENCOUNTER — APPOINTMENT (OUTPATIENT)
Dept: HEMATOLOGY ONCOLOGY | Facility: CLINIC | Age: 58
End: 2023-03-27
Payer: MEDICAID

## 2023-03-27 VITALS
BODY MASS INDEX: 35.47 KG/M2 | HEIGHT: 67 IN | OXYGEN SATURATION: 98 % | DIASTOLIC BLOOD PRESSURE: 92 MMHG | SYSTOLIC BLOOD PRESSURE: 158 MMHG | RESPIRATION RATE: 18 BRPM | WEIGHT: 226 LBS | TEMPERATURE: 97.4 F | HEART RATE: 97 BPM

## 2023-03-27 DIAGNOSIS — Z98.890 OTHER SPECIFIED POSTPROCEDURAL STATES: Chronic | ICD-10-CM

## 2023-03-27 DIAGNOSIS — C50.919 MALIGNANT NEOPLASM OF UNSPECIFIED SITE OF UNSPECIFIED FEMALE BREAST: ICD-10-CM

## 2023-03-27 DIAGNOSIS — Z98.891 HISTORY OF UTERINE SCAR FROM PREVIOUS SURGERY: Chronic | ICD-10-CM

## 2023-03-27 DIAGNOSIS — Z90.710 ACQUIRED ABSENCE OF BOTH CERVIX AND UTERUS: Chronic | ICD-10-CM

## 2023-03-27 PROCEDURE — 93306 TTE W/DOPPLER COMPLETE: CPT | Mod: 26

## 2023-03-27 PROCEDURE — 36415 COLL VENOUS BLD VENIPUNCTURE: CPT

## 2023-03-27 PROCEDURE — 93306 TTE W/DOPPLER COMPLETE: CPT

## 2023-03-27 PROCEDURE — 93356 MYOCRD STRAIN IMG SPCKL TRCK: CPT | Mod: 26

## 2023-03-27 PROCEDURE — 99213 OFFICE O/P EST LOW 20 MIN: CPT | Mod: 25

## 2023-03-27 NOTE — PHYSICAL EXAM
[Fully active, able to carry on all pre-disease performance without restriction] : Status 0 - Fully active, able to carry on all pre-disease performance without restriction [Normal] : affect appropriate [de-identified] : about 3cm palpable hard mass in right breast ~10 clock position 10cm from nipple, no palpable axillary nodes

## 2023-03-27 NOTE — HISTORY OF PRESENT ILLNESS
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 Here today for f/u to discuss chemo education prior to tx 3/29. \par \par Patient states she first palpated the right breast mass sometime in 2022 which prompted her to seek medical care. Patient's PCP Dr. Radha Sarah sent patient for imaging which yielded a 2.1cm lobulated heterogeneous mass on sonogram 10:00 10FN. She has no prior hx of breast surgery or bx. Denies family history of breast or ovarian cancer. Patient denies skin changes or nipple discharge bilaterally. \par \par 22: B/l MG ([facility])- heterogeneously dense. ROCKY. BI-RADS 2\par 23: R MG & B/l US (R palpable mass)- heterogenously dense. R 2.6 cm mass w/ heterogeneous calcs UOQ (palpable). US- R 2.1cm lobulated heterogeneous mass w/ calcs 10:00 10FN (palpable- rec US bx). L 0.6cm cyst 5:00 6FN. BI-RADS 4\par 23: R US bx 2.3cm mass 10:00 10FN (heart clip)- IDC (poorly differentiated), ER- (0%), GA- (0%), HER2- (0). Concordant- recommend definitive surgical and oncological management. \par \par OBhx:\par Menarche 15yo\par LMP s/p partial hysterectomy at age 47yo\par  did not breastfeed, age at first birth 20yo. \par Denies h/o fertility treatments, denies h/o HRT and OCP [de-identified] : Reports only palpable right breast mass w/o any other breast related complaints. Patient states she feels well. \par \par Patient is here prior to starting chemotherapy with weekly carbo/taxol with pembro on Wed 3/29.

## 2023-03-28 LAB
BASOPHILS # BLD AUTO: 0.04 K/UL
BASOPHILS NFR BLD AUTO: 0.7 %
EOSINOPHIL # BLD AUTO: 0.11 K/UL
EOSINOPHIL NFR BLD AUTO: 1.9 %
HCT VFR BLD CALC: 37.9 %
HGB BLD-MCNC: 11.2 G/DL
IMM GRANULOCYTES NFR BLD AUTO: 0.2 %
LYMPHOCYTES # BLD AUTO: 2.2 K/UL
LYMPHOCYTES NFR BLD AUTO: 38.9 %
MAN DIFF?: NORMAL
MCHC RBC-ENTMCNC: 26.4 PG
MCHC RBC-ENTMCNC: 29.6 GM/DL
MCV RBC AUTO: 89.2 FL
MONOCYTES # BLD AUTO: 0.39 K/UL
MONOCYTES NFR BLD AUTO: 6.9 %
NEUTROPHILS # BLD AUTO: 2.91 K/UL
NEUTROPHILS NFR BLD AUTO: 51.4 %
PLATELET # BLD AUTO: 274 K/UL
RBC # BLD: 4.25 M/UL
RBC # FLD: 13.6 %
WBC # FLD AUTO: 5.66 K/UL

## 2023-03-28 RX ORDER — DEXAMETHASONE 0.5 MG/5ML
4 ELIXIR ORAL ONCE
Refills: 0 | Status: COMPLETED | OUTPATIENT
Start: 2023-05-24 | End: 2023-05-24

## 2023-03-28 RX ORDER — ACETAMINOPHEN 500 MG
650 TABLET ORAL ONCE
Refills: 0 | Status: COMPLETED | OUTPATIENT
Start: 2023-05-24 | End: 2023-05-24

## 2023-03-28 RX ORDER — DIPHENHYDRAMINE HCL 50 MG
25 CAPSULE ORAL ONCE
Refills: 0 | Status: COMPLETED | OUTPATIENT
Start: 2023-05-24 | End: 2023-05-24

## 2023-03-28 RX ORDER — FAMOTIDINE 10 MG/ML
20 INJECTION INTRAVENOUS ONCE
Refills: 0 | Status: COMPLETED | OUTPATIENT
Start: 2023-05-24 | End: 2023-05-24

## 2023-03-28 RX ORDER — DIPHENHYDRAMINE HCL 50 MG
25 CAPSULE ORAL ONCE
Refills: 0 | Status: COMPLETED | OUTPATIENT
Start: 2023-05-31 | End: 2023-05-31

## 2023-03-28 RX ORDER — ACETAMINOPHEN 500 MG
650 TABLET ORAL ONCE
Refills: 0 | Status: COMPLETED | OUTPATIENT
Start: 2023-04-26 | End: 2023-04-26

## 2023-03-29 ENCOUNTER — APPOINTMENT (OUTPATIENT)
Dept: INFUSION THERAPY | Facility: CLINIC | Age: 58
End: 2023-03-29

## 2023-03-29 ENCOUNTER — OUTPATIENT (OUTPATIENT)
Dept: OUTPATIENT SERVICES | Facility: HOSPITAL | Age: 58
LOS: 1 days | End: 2023-03-29
Payer: COMMERCIAL

## 2023-03-29 VITALS
DIASTOLIC BLOOD PRESSURE: 71 MMHG | HEART RATE: 98 BPM | OXYGEN SATURATION: 95 % | RESPIRATION RATE: 17 BRPM | TEMPERATURE: 98 F | SYSTOLIC BLOOD PRESSURE: 158 MMHG | WEIGHT: 225.09 LBS | HEIGHT: 67 IN

## 2023-03-29 VITALS
DIASTOLIC BLOOD PRESSURE: 79 MMHG | TEMPERATURE: 98 F | OXYGEN SATURATION: 97 % | SYSTOLIC BLOOD PRESSURE: 146 MMHG | RESPIRATION RATE: 17 BRPM | HEART RATE: 97 BPM

## 2023-03-29 DIAGNOSIS — Z90.710 ACQUIRED ABSENCE OF BOTH CERVIX AND UTERUS: Chronic | ICD-10-CM

## 2023-03-29 DIAGNOSIS — C50.919 MALIGNANT NEOPLASM OF UNSPECIFIED SITE OF UNSPECIFIED FEMALE BREAST: ICD-10-CM

## 2023-03-29 DIAGNOSIS — Z98.890 OTHER SPECIFIED POSTPROCEDURAL STATES: Chronic | ICD-10-CM

## 2023-03-29 DIAGNOSIS — Z98.891 HISTORY OF UTERINE SCAR FROM PREVIOUS SURGERY: Chronic | ICD-10-CM

## 2023-03-29 LAB
T4 FREE SERPL-MCNC: 1.89 NG/DL — HIGH (ref 0.93–1.7)
TSH SERPL-MCNC: <0.118 UIU/ML — LOW (ref 0.27–4.2)

## 2023-03-29 PROCEDURE — 36415 COLL VENOUS BLD VENIPUNCTURE: CPT

## 2023-03-29 PROCEDURE — 84439 ASSAY OF FREE THYROXINE: CPT

## 2023-03-29 PROCEDURE — 96413 CHEMO IV INFUSION 1 HR: CPT

## 2023-03-29 PROCEDURE — 84443 ASSAY THYROID STIM HORMONE: CPT

## 2023-03-29 PROCEDURE — 96417 CHEMO IV INFUS EACH ADDL SEQ: CPT

## 2023-03-29 PROCEDURE — 96375 TX/PRO/DX INJ NEW DRUG ADDON: CPT

## 2023-03-29 RX ORDER — PEMBROLIZUMAB 25 MG/ML
400 INJECTION, SOLUTION INTRAVENOUS ONCE
Refills: 0 | Status: COMPLETED | OUTPATIENT
Start: 2023-03-29 | End: 2023-03-29

## 2023-03-29 RX ORDER — PALONOSETRON HYDROCHLORIDE 0.25 MG/5ML
0.25 INJECTION, SOLUTION INTRAVENOUS ONCE
Refills: 0 | Status: COMPLETED | OUTPATIENT
Start: 2023-03-29 | End: 2023-03-29

## 2023-03-29 RX ORDER — CARBOPLATIN 50 MG
190 VIAL (EA) INTRAVENOUS ONCE
Refills: 0 | Status: COMPLETED | OUTPATIENT
Start: 2023-04-05 | End: 2023-04-05

## 2023-03-29 RX ORDER — PACLITAXEL 6 MG/ML
170 INJECTION, SOLUTION, CONCENTRATE INTRAVENOUS ONCE
Refills: 0 | Status: COMPLETED | OUTPATIENT
Start: 2023-05-24 | End: 2023-05-24

## 2023-03-29 RX ORDER — DIPHENHYDRAMINE HCL 50 MG
25 CAPSULE ORAL ONCE
Refills: 0 | Status: COMPLETED | OUTPATIENT
Start: 2023-03-29 | End: 2023-03-29

## 2023-03-29 RX ORDER — ACETAMINOPHEN 500 MG
650 TABLET ORAL ONCE
Refills: 0 | Status: COMPLETED | OUTPATIENT
Start: 2023-03-29 | End: 2023-03-29

## 2023-03-29 RX ORDER — FAMOTIDINE 10 MG/ML
20 INJECTION INTRAVENOUS ONCE
Refills: 0 | Status: COMPLETED | OUTPATIENT
Start: 2023-03-29 | End: 2023-03-29

## 2023-03-29 RX ORDER — PACLITAXEL 6 MG/ML
170 INJECTION, SOLUTION, CONCENTRATE INTRAVENOUS ONCE
Refills: 0 | Status: COMPLETED | OUTPATIENT
Start: 2023-03-29 | End: 2023-03-29

## 2023-03-29 RX ORDER — DEXAMETHASONE 0.5 MG/5ML
10 ELIXIR ORAL ONCE
Refills: 0 | Status: COMPLETED | OUTPATIENT
Start: 2023-03-29 | End: 2023-03-29

## 2023-03-29 RX ORDER — CARBOPLATIN 50 MG
190 VIAL (EA) INTRAVENOUS ONCE
Refills: 0 | Status: COMPLETED | OUTPATIENT
Start: 2023-03-29 | End: 2023-03-29

## 2023-03-29 RX ORDER — PACLITAXEL 6 MG/ML
170 INJECTION, SOLUTION, CONCENTRATE INTRAVENOUS ONCE
Refills: 0 | Status: COMPLETED | OUTPATIENT
Start: 2023-04-05 | End: 2023-04-05

## 2023-03-29 RX ADMIN — Medication 202 MILLIGRAM(S): at 12:10

## 2023-03-29 RX ADMIN — Medication 204 MILLIGRAM(S): at 12:30

## 2023-03-29 RX ADMIN — FAMOTIDINE 208 MILLIGRAM(S): 10 INJECTION INTRAVENOUS at 12:31

## 2023-03-29 RX ADMIN — Medication 650 MILLIGRAM(S): at 11:50

## 2023-03-29 RX ADMIN — Medication 650 MILLIGRAM(S): at 13:14

## 2023-03-29 RX ADMIN — Medication 190 MILLIGRAM(S): at 14:25

## 2023-03-29 RX ADMIN — FAMOTIDINE 20 MILLIGRAM(S): 10 INJECTION INTRAVENOUS at 12:46

## 2023-03-29 RX ADMIN — PACLITAXEL 170 MILLIGRAM(S): 6 INJECTION, SOLUTION, CONCENTRATE INTRAVENOUS at 14:20

## 2023-03-29 RX ADMIN — PEMBROLIZUMAB 400 MILLIGRAM(S): 25 INJECTION, SOLUTION INTRAVENOUS at 12:03

## 2023-03-29 RX ADMIN — Medication 190 MILLIGRAM(S): at 15:00

## 2023-03-29 RX ADMIN — PALONOSETRON HYDROCHLORIDE 0.25 MILLIGRAM(S): 0.25 INJECTION, SOLUTION INTRAVENOUS at 12:48

## 2023-03-29 RX ADMIN — Medication 25 MILLIGRAM(S): at 12:25

## 2023-03-29 RX ADMIN — PACLITAXEL 170 MILLIGRAM(S): 6 INJECTION, SOLUTION, CONCENTRATE INTRAVENOUS at 13:17

## 2023-03-29 RX ADMIN — Medication 10 MILLIGRAM(S): at 12:45

## 2023-03-29 RX ADMIN — PEMBROLIZUMAB 400 MILLIGRAM(S): 25 INJECTION, SOLUTION INTRAVENOUS at 11:33

## 2023-03-31 ENCOUNTER — NON-APPOINTMENT (OUTPATIENT)
Age: 58
End: 2023-03-31

## 2023-04-05 ENCOUNTER — APPOINTMENT (OUTPATIENT)
Dept: INFUSION THERAPY | Facility: CLINIC | Age: 58
End: 2023-04-05

## 2023-04-05 ENCOUNTER — OUTPATIENT (OUTPATIENT)
Dept: OUTPATIENT SERVICES | Facility: HOSPITAL | Age: 58
LOS: 1 days | End: 2023-04-05
Payer: MEDICAID

## 2023-04-05 ENCOUNTER — APPOINTMENT (OUTPATIENT)
Dept: HEMATOLOGY ONCOLOGY | Facility: CLINIC | Age: 58
End: 2023-04-05
Payer: MEDICAID

## 2023-04-05 ENCOUNTER — LABORATORY RESULT (OUTPATIENT)
Age: 58
End: 2023-04-05

## 2023-04-05 ENCOUNTER — NON-APPOINTMENT (OUTPATIENT)
Age: 58
End: 2023-04-05

## 2023-04-05 VITALS
SYSTOLIC BLOOD PRESSURE: 134 MMHG | RESPIRATION RATE: 18 BRPM | HEART RATE: 89 BPM | OXYGEN SATURATION: 95 % | DIASTOLIC BLOOD PRESSURE: 84 MMHG | TEMPERATURE: 98 F

## 2023-04-05 VITALS
HEIGHT: 67 IN | BODY MASS INDEX: 35.63 KG/M2 | HEART RATE: 98 BPM | WEIGHT: 227 LBS | DIASTOLIC BLOOD PRESSURE: 90 MMHG | RESPIRATION RATE: 18 BRPM | TEMPERATURE: 98.5 F | OXYGEN SATURATION: 97 % | SYSTOLIC BLOOD PRESSURE: 161 MMHG

## 2023-04-05 VITALS
SYSTOLIC BLOOD PRESSURE: 161 MMHG | WEIGHT: 227.08 LBS | RESPIRATION RATE: 18 BRPM | DIASTOLIC BLOOD PRESSURE: 90 MMHG | TEMPERATURE: 98 F | OXYGEN SATURATION: 97 % | HEIGHT: 67 IN | HEART RATE: 98 BPM

## 2023-04-05 DIAGNOSIS — Z98.890 OTHER SPECIFIED POSTPROCEDURAL STATES: Chronic | ICD-10-CM

## 2023-04-05 DIAGNOSIS — Z98.891 HISTORY OF UTERINE SCAR FROM PREVIOUS SURGERY: Chronic | ICD-10-CM

## 2023-04-05 DIAGNOSIS — C50.919 MALIGNANT NEOPLASM OF UNSPECIFIED SITE OF UNSPECIFIED FEMALE BREAST: ICD-10-CM

## 2023-04-05 DIAGNOSIS — Z90.710 ACQUIRED ABSENCE OF BOTH CERVIX AND UTERUS: Chronic | ICD-10-CM

## 2023-04-05 LAB
ALBUMIN SERPL ELPH-MCNC: 3.5 G/DL
ALP BLD-CCNC: 69 U/L
ALT SERPL-CCNC: 19 U/L
ANION GAP SERPL CALC-SCNC: 9 MMOL/L
AST SERPL-CCNC: 24 U/L
BILIRUB SERPL-MCNC: 0.6 MG/DL
BUN SERPL-MCNC: 15 MG/DL
CALCIUM SERPL-MCNC: 9.8 MG/DL
CHLORIDE SERPL-SCNC: 104 MMOL/L
CO2 SERPL-SCNC: 28 MMOL/L
CREAT SERPL-MCNC: 0.9 MG/DL
EGFR: 75 ML/MIN/1.73M2
GLUCOSE SERPL-MCNC: 201 MG/DL
POTASSIUM SERPL-SCNC: 4.3 MMOL/L
PROT SERPL-MCNC: 8.2 G/DL
SODIUM SERPL-SCNC: 141 MMOL/L
TSH SERPL-ACNC: 0.46 UIU/ML

## 2023-04-05 PROCEDURE — 96413 CHEMO IV INFUSION 1 HR: CPT

## 2023-04-05 PROCEDURE — 96375 TX/PRO/DX INJ NEW DRUG ADDON: CPT

## 2023-04-05 PROCEDURE — 99214 OFFICE O/P EST MOD 30 MIN: CPT | Mod: 25

## 2023-04-05 PROCEDURE — 96417 CHEMO IV INFUS EACH ADDL SEQ: CPT

## 2023-04-05 PROCEDURE — 36415 COLL VENOUS BLD VENIPUNCTURE: CPT

## 2023-04-05 RX ORDER — DEXAMETHASONE 0.5 MG/5ML
4 ELIXIR ORAL ONCE
Refills: 0 | Status: COMPLETED | OUTPATIENT
Start: 2023-04-05 | End: 2023-04-05

## 2023-04-05 RX ORDER — DIPHENHYDRAMINE HCL 50 MG
25 CAPSULE ORAL ONCE
Refills: 0 | Status: COMPLETED | OUTPATIENT
Start: 2023-04-05 | End: 2023-04-05

## 2023-04-05 RX ORDER — ACETAMINOPHEN 500 MG
650 TABLET ORAL ONCE
Refills: 0 | Status: COMPLETED | OUTPATIENT
Start: 2023-04-05 | End: 2023-04-05

## 2023-04-05 RX ORDER — PALONOSETRON HYDROCHLORIDE 0.25 MG/5ML
0.25 INJECTION, SOLUTION INTRAVENOUS ONCE
Refills: 0 | Status: COMPLETED | OUTPATIENT
Start: 2023-04-05 | End: 2023-04-05

## 2023-04-05 RX ORDER — FAMOTIDINE 10 MG/ML
20 INJECTION INTRAVENOUS ONCE
Refills: 0 | Status: COMPLETED | OUTPATIENT
Start: 2023-04-05 | End: 2023-04-05

## 2023-04-05 RX ADMIN — Medication 190 MILLIGRAM(S): at 15:33

## 2023-04-05 RX ADMIN — Medication 650 MILLIGRAM(S): at 12:50

## 2023-04-05 RX ADMIN — PALONOSETRON HYDROCHLORIDE 0.25 MILLIGRAM(S): 0.25 INJECTION, SOLUTION INTRAVENOUS at 12:50

## 2023-04-05 RX ADMIN — PACLITAXEL 170 MILLIGRAM(S): 6 INJECTION, SOLUTION, CONCENTRATE INTRAVENOUS at 15:00

## 2023-04-05 RX ADMIN — Medication 202 MILLIGRAM(S): at 13:30

## 2023-04-05 RX ADMIN — Medication 190 MILLIGRAM(S): at 15:03

## 2023-04-05 RX ADMIN — FAMOTIDINE 208 MILLIGRAM(S): 10 INJECTION INTRAVENOUS at 12:50

## 2023-04-05 RX ADMIN — Medication 204 MILLIGRAM(S): at 13:08

## 2023-04-05 RX ADMIN — Medication 4 MILLIGRAM(S): at 13:23

## 2023-04-05 RX ADMIN — Medication 25 MILLIGRAM(S): at 13:45

## 2023-04-05 RX ADMIN — PACLITAXEL 170 MILLIGRAM(S): 6 INJECTION, SOLUTION, CONCENTRATE INTRAVENOUS at 13:56

## 2023-04-05 RX ADMIN — FAMOTIDINE 20 MILLIGRAM(S): 10 INJECTION INTRAVENOUS at 13:08

## 2023-04-05 RX ADMIN — Medication 650 MILLIGRAM(S): at 13:51

## 2023-04-05 NOTE — HISTORY OF PRESENT ILLNESS
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 Here today for C2\par \par Patient states she first palpated the right breast mass sometime in 2022 which prompted her to seek medical care. Patient's PCP Dr. Radha Sarah sent patient for imaging which yielded a 2.1cm lobulated heterogeneous mass on sonogram 10:00 10FN. She has no prior hx of breast surgery or bx. Denies family history of breast or ovarian cancer. Patient denies skin changes or nipple discharge bilaterally. \par \par 22: B/l MG ([facility])- heterogeneously dense. ROCKY. BI-RADS 2\par 23: R MG & B/l US (R palpable mass)- heterogenously dense. R 2.6 cm mass w/ heterogeneous calcs UOQ (palpable). US- R 2.1cm lobulated heterogeneous mass w/ calcs 10:00 10FN (palpable- rec US bx). L 0.6cm cyst 5:00 6FN. BI-RADS 4\par 23: R US bx 2.3cm mass 10:00 10FN (heart clip)- IDC (poorly differentiated), ER- (0%), CO- (0%), HER2- (0). Concordant- recommend definitive surgical and oncological management. \par \par OBhx:\par Menarche 15yo\par LMP s/p partial hysterectomy at age 45yo\par  did not breastfeed, age at first birth 18yo. \par Denies h/o fertility treatments, denies h/o HRT and OCP [de-identified] : Reports only palpable right breast mass w/o any other breast related complaints. Patient states she feels well. \par \par Pt started weekly carbo/taxol with pembro on Wed 3/29. Returns for week 2 of treatment today. \par States has very mild numbness/tingling in both her toes and finger tips, denies it affecting her function and states this started this weekend. States never had this in past even though DM.

## 2023-04-05 NOTE — PHYSICAL EXAM
[Fully active, able to carry on all pre-disease performance without restriction] : Status 0 - Fully active, able to carry on all pre-disease performance without restriction [Normal] : affect appropriate [de-identified] : about 3cm palpable hard mass in right breast ~10 clock position 10cm from nipple, no palpable axillary nodes

## 2023-04-05 NOTE — ASSESSMENT
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 \par \par Patient states she first palpated the right breast mass sometime in February 2022 which prompted her to seek medical care. Patient's PCP Dr. Radha Sarah sent patient for imaging which yielded a 2.1cm lobulated heterogeneous mass on sonogram 10:00 10FN. She has no prior hx of breast surgery or bx. Denies family history of breast or ovarian cancer. Patient denies skin changes or nipple discharge bilaterally. \par \par Reviewed clinical utility of neoadjuvant chemoimmunotherapy as per the Keynote 522 clinical trial - recommending starting weekly carbo/taxol x12 followed by adriamycin/cytoxan every 2 weeks with continued pembrolizumab every 6 weeks throughout duration of chemotherapy IV through chemoport with growth factor support (Neulasta onbody injector) after AC. \par Reviewed potential side effects of chemotherapy including but not limited to allergic reactions/infusion reactions, bone marrow suppression leading to fatigue and increased risk of infection and febrile neutropenia,anemia and thrombocytopenia needing supportive transfusions,hair loss, skin/nail changes, nausea/vomiting, stomatitis, abdominal pain, diarrhea/constipation, kidney and liver dysfunction with electrolyte disturbances, peripheral neuropathy, small risk of cardiomyopathy and secondary malignancies (ie hematologic malignancies due to chemotherapy). Also discussed potential side effects of immunotherapy (pembro) including inflammation of colon leading to colitis, inflammation of lining of lung or heart for example or other organs leading to pleuritis and pericarditis respectively, also other possible conditions such as hypophysitis or pancreatitis; reviewed treatment in such a case would involve stopping of the offending agent and initiating steroids. \par \par Re-reviewed above risks of treatment, patient had all her questions answered and expressed verbal understanding and agreement to proceed with treatment. She signed a consent form. \par \par \par 4/5/23 To proceed with week two of treatment (Carbo/Taxol); Pembro was given on 3/29 and will continue every 6 wks).\par Compazine 10mg PO Q6hrs as needed for nausea/vomiting. \par \par RTC 4/12 for treatment, next follow up prior to treatment on 4/19.

## 2023-04-12 ENCOUNTER — OUTPATIENT (OUTPATIENT)
Dept: OUTPATIENT SERVICES | Facility: HOSPITAL | Age: 58
LOS: 1 days | End: 2023-04-12
Payer: MEDICAID

## 2023-04-12 ENCOUNTER — APPOINTMENT (OUTPATIENT)
Dept: INFUSION THERAPY | Facility: CLINIC | Age: 58
End: 2023-04-12

## 2023-04-12 VITALS
WEIGHT: 229.94 LBS | OXYGEN SATURATION: 98 % | SYSTOLIC BLOOD PRESSURE: 139 MMHG | HEART RATE: 100 BPM | DIASTOLIC BLOOD PRESSURE: 89 MMHG | TEMPERATURE: 98 F | HEIGHT: 67 IN | RESPIRATION RATE: 18 BRPM

## 2023-04-12 DIAGNOSIS — Z90.710 ACQUIRED ABSENCE OF BOTH CERVIX AND UTERUS: Chronic | ICD-10-CM

## 2023-04-12 DIAGNOSIS — Z98.891 HISTORY OF UTERINE SCAR FROM PREVIOUS SURGERY: Chronic | ICD-10-CM

## 2023-04-12 DIAGNOSIS — C50.919 MALIGNANT NEOPLASM OF UNSPECIFIED SITE OF UNSPECIFIED FEMALE BREAST: ICD-10-CM

## 2023-04-12 LAB
ALBUMIN SERPL ELPH-MCNC: 3.2 G/DL — LOW (ref 3.3–5)
ALP SERPL-CCNC: 74 U/L — SIGNIFICANT CHANGE UP (ref 40–120)
ALT FLD-CCNC: 20 U/L — SIGNIFICANT CHANGE UP (ref 10–45)
ANION GAP SERPL CALC-SCNC: 9 MMOL/L — SIGNIFICANT CHANGE UP (ref 5–17)
AST SERPL-CCNC: 24 U/L — SIGNIFICANT CHANGE UP (ref 10–40)
BILIRUB SERPL-MCNC: 0.5 MG/DL — SIGNIFICANT CHANGE UP (ref 0.2–1.2)
BUN SERPL-MCNC: 17 MG/DL — SIGNIFICANT CHANGE UP (ref 7–23)
CALCIUM SERPL-MCNC: 9.9 MG/DL — SIGNIFICANT CHANGE UP (ref 8.4–10.5)
CHLORIDE SERPL-SCNC: 104 MMOL/L — SIGNIFICANT CHANGE UP (ref 96–108)
CO2 SERPL-SCNC: 29 MMOL/L — SIGNIFICANT CHANGE UP (ref 22–31)
CREAT SERPL-MCNC: 1 MG/DL — SIGNIFICANT CHANGE UP (ref 0.5–1.3)
EGFR: 66 ML/MIN/1.73M2 — SIGNIFICANT CHANGE UP
GLUCOSE SERPL-MCNC: 242 MG/DL — HIGH (ref 70–99)
HCT VFR BLD CALC: 33.7 % — LOW (ref 34.5–45)
HGB BLD-MCNC: 10.4 G/DL — LOW (ref 11.5–15.5)
LYMPHOCYTES # BLD AUTO: 1.7 K/UL — SIGNIFICANT CHANGE UP (ref 1–3.3)
LYMPHOCYTES # BLD AUTO: 47.1 % — HIGH (ref 13–44)
MCHC RBC-ENTMCNC: 27.1 PG — SIGNIFICANT CHANGE UP (ref 27–34)
MCHC RBC-ENTMCNC: 30.9 GM/DL — LOW (ref 32–36)
MCV RBC AUTO: 87.8 FL — SIGNIFICANT CHANGE UP (ref 80–100)
NEUTROPHILS # BLD AUTO: 1.7 K/UL — LOW (ref 1.8–7.4)
NEUTROPHILS NFR BLD AUTO: 47.7 % — SIGNIFICANT CHANGE UP (ref 43–77)
PLATELET # BLD AUTO: 298 K/UL — SIGNIFICANT CHANGE UP (ref 150–400)
POTASSIUM SERPL-MCNC: 4.6 MMOL/L — SIGNIFICANT CHANGE UP (ref 3.5–5.3)
POTASSIUM SERPL-SCNC: 4.6 MMOL/L — SIGNIFICANT CHANGE UP (ref 3.5–5.3)
PROT SERPL-MCNC: 7.6 G/DL — SIGNIFICANT CHANGE UP (ref 6–8.3)
RBC # BLD: 3.84 M/UL — SIGNIFICANT CHANGE UP (ref 3.8–5.2)
RBC # FLD: 13.3 % — SIGNIFICANT CHANGE UP (ref 10.3–14.5)
SODIUM SERPL-SCNC: 142 MMOL/L — SIGNIFICANT CHANGE UP (ref 135–145)
T4 FREE SERPL-MCNC: 1.29 NG/DL — SIGNIFICANT CHANGE UP (ref 0.93–1.7)
TSH SERPL-MCNC: 3.73 UIU/ML — SIGNIFICANT CHANGE UP (ref 0.27–4.2)
WBC # BLD: 3.6 K/UL — LOW (ref 3.8–10.5)
WBC # FLD AUTO: 3.6 K/UL — LOW (ref 3.8–10.5)

## 2023-04-12 PROCEDURE — 96417 CHEMO IV INFUS EACH ADDL SEQ: CPT

## 2023-04-12 PROCEDURE — 84443 ASSAY THYROID STIM HORMONE: CPT

## 2023-04-12 PROCEDURE — 96375 TX/PRO/DX INJ NEW DRUG ADDON: CPT

## 2023-04-12 PROCEDURE — 80053 COMPREHEN METABOLIC PANEL: CPT

## 2023-04-12 PROCEDURE — 85025 COMPLETE CBC W/AUTO DIFF WBC: CPT

## 2023-04-12 PROCEDURE — 36415 COLL VENOUS BLD VENIPUNCTURE: CPT

## 2023-04-12 PROCEDURE — 96413 CHEMO IV INFUSION 1 HR: CPT

## 2023-04-12 PROCEDURE — 84439 ASSAY OF FREE THYROXINE: CPT

## 2023-04-12 RX ORDER — FAMOTIDINE 10 MG/ML
20 INJECTION INTRAVENOUS ONCE
Refills: 0 | Status: COMPLETED | OUTPATIENT
Start: 2023-04-12 | End: 2023-04-12

## 2023-04-12 RX ORDER — DEXAMETHASONE 0.5 MG/5ML
4 ELIXIR ORAL ONCE
Refills: 0 | Status: COMPLETED | OUTPATIENT
Start: 2023-04-12 | End: 2023-04-12

## 2023-04-12 RX ORDER — ACETAMINOPHEN 500 MG
650 TABLET ORAL ONCE
Refills: 0 | Status: COMPLETED | OUTPATIENT
Start: 2023-04-12 | End: 2023-04-12

## 2023-04-12 RX ORDER — DIPHENHYDRAMINE HCL 50 MG
25 CAPSULE ORAL ONCE
Refills: 0 | Status: COMPLETED | OUTPATIENT
Start: 2023-04-12 | End: 2023-04-12

## 2023-04-12 RX ORDER — CARBOPLATIN 50 MG
190 VIAL (EA) INTRAVENOUS ONCE
Refills: 0 | Status: COMPLETED | OUTPATIENT
Start: 2023-04-12 | End: 2023-04-12

## 2023-04-12 RX ORDER — PACLITAXEL 6 MG/ML
170 INJECTION, SOLUTION, CONCENTRATE INTRAVENOUS ONCE
Refills: 0 | Status: COMPLETED | OUTPATIENT
Start: 2023-04-12 | End: 2023-04-12

## 2023-04-12 RX ORDER — PALONOSETRON HYDROCHLORIDE 0.25 MG/5ML
0.25 INJECTION, SOLUTION INTRAVENOUS ONCE
Refills: 0 | Status: COMPLETED | OUTPATIENT
Start: 2023-04-12 | End: 2023-04-12

## 2023-04-12 RX ADMIN — PACLITAXEL 170 MILLIGRAM(S): 6 INJECTION, SOLUTION, CONCENTRATE INTRAVENOUS at 13:34

## 2023-04-12 RX ADMIN — Medication 190 MILLIGRAM(S): at 14:05

## 2023-04-12 RX ADMIN — PALONOSETRON HYDROCHLORIDE 0.25 MILLIGRAM(S): 0.25 INJECTION, SOLUTION INTRAVENOUS at 12:05

## 2023-04-12 RX ADMIN — Medication 202 MILLIGRAM(S): at 12:33

## 2023-04-12 RX ADMIN — Medication 25 MILLIGRAM(S): at 12:48

## 2023-04-12 RX ADMIN — Medication 4 MILLIGRAM(S): at 12:37

## 2023-04-12 RX ADMIN — FAMOTIDINE 20 MILLIGRAM(S): 10 INJECTION INTRAVENOUS at 12:24

## 2023-04-12 RX ADMIN — Medication 190 MILLIGRAM(S): at 13:34

## 2023-04-12 RX ADMIN — Medication 650 MILLIGRAM(S): at 12:05

## 2023-04-12 RX ADMIN — FAMOTIDINE 208 MILLIGRAM(S): 10 INJECTION INTRAVENOUS at 12:09

## 2023-04-12 RX ADMIN — Medication 204 MILLIGRAM(S): at 12:22

## 2023-04-12 RX ADMIN — Medication 650 MILLIGRAM(S): at 13:00

## 2023-04-12 RX ADMIN — PACLITAXEL 170 MILLIGRAM(S): 6 INJECTION, SOLUTION, CONCENTRATE INTRAVENOUS at 12:34

## 2023-04-19 ENCOUNTER — LABORATORY RESULT (OUTPATIENT)
Age: 58
End: 2023-04-19

## 2023-04-19 ENCOUNTER — NON-APPOINTMENT (OUTPATIENT)
Age: 58
End: 2023-04-19

## 2023-04-19 ENCOUNTER — APPOINTMENT (OUTPATIENT)
Dept: INFUSION THERAPY | Facility: CLINIC | Age: 58
End: 2023-04-19

## 2023-04-19 ENCOUNTER — OUTPATIENT (OUTPATIENT)
Dept: OUTPATIENT SERVICES | Facility: HOSPITAL | Age: 58
LOS: 1 days | End: 2023-04-19
Payer: MEDICAID

## 2023-04-19 ENCOUNTER — APPOINTMENT (OUTPATIENT)
Dept: HEMATOLOGY ONCOLOGY | Facility: CLINIC | Age: 58
End: 2023-04-19
Payer: MEDICAID

## 2023-04-19 VITALS
SYSTOLIC BLOOD PRESSURE: 133 MMHG | HEART RATE: 89 BPM | OXYGEN SATURATION: 98 % | RESPIRATION RATE: 18 BRPM | DIASTOLIC BLOOD PRESSURE: 76 MMHG | TEMPERATURE: 98 F

## 2023-04-19 VITALS
RESPIRATION RATE: 18 BRPM | DIASTOLIC BLOOD PRESSURE: 80 MMHG | TEMPERATURE: 98 F | HEIGHT: 67 IN | SYSTOLIC BLOOD PRESSURE: 137 MMHG | OXYGEN SATURATION: 99 % | HEART RATE: 82 BPM | WEIGHT: 227.08 LBS

## 2023-04-19 VITALS
DIASTOLIC BLOOD PRESSURE: 80 MMHG | WEIGHT: 227 LBS | RESPIRATION RATE: 18 BRPM | BODY MASS INDEX: 35.63 KG/M2 | HEIGHT: 67 IN | OXYGEN SATURATION: 99 % | HEART RATE: 82 BPM | SYSTOLIC BLOOD PRESSURE: 137 MMHG | TEMPERATURE: 97 F

## 2023-04-19 DIAGNOSIS — C50.919 MALIGNANT NEOPLASM OF UNSPECIFIED SITE OF UNSPECIFIED FEMALE BREAST: ICD-10-CM

## 2023-04-19 DIAGNOSIS — Z98.891 HISTORY OF UTERINE SCAR FROM PREVIOUS SURGERY: Chronic | ICD-10-CM

## 2023-04-19 DIAGNOSIS — Z98.890 OTHER SPECIFIED POSTPROCEDURAL STATES: Chronic | ICD-10-CM

## 2023-04-19 DIAGNOSIS — Z90.710 ACQUIRED ABSENCE OF BOTH CERVIX AND UTERUS: Chronic | ICD-10-CM

## 2023-04-19 PROCEDURE — 96413 CHEMO IV INFUSION 1 HR: CPT

## 2023-04-19 PROCEDURE — 96375 TX/PRO/DX INJ NEW DRUG ADDON: CPT

## 2023-04-19 PROCEDURE — 36415 COLL VENOUS BLD VENIPUNCTURE: CPT

## 2023-04-19 PROCEDURE — 99213 OFFICE O/P EST LOW 20 MIN: CPT | Mod: 25

## 2023-04-19 RX ORDER — FAMOTIDINE 10 MG/ML
20 INJECTION INTRAVENOUS ONCE
Refills: 0 | Status: COMPLETED | OUTPATIENT
Start: 2023-04-19 | End: 2023-04-19

## 2023-04-19 RX ORDER — DIPHENHYDRAMINE HCL 50 MG
25 CAPSULE ORAL ONCE
Refills: 0 | Status: COMPLETED | OUTPATIENT
Start: 2023-04-19 | End: 2023-04-19

## 2023-04-19 RX ORDER — PACLITAXEL 6 MG/ML
170 INJECTION, SOLUTION, CONCENTRATE INTRAVENOUS ONCE
Refills: 0 | Status: COMPLETED | OUTPATIENT
Start: 2023-04-19 | End: 2023-04-19

## 2023-04-19 RX ORDER — DEXAMETHASONE 0.5 MG/5ML
4 ELIXIR ORAL ONCE
Refills: 0 | Status: COMPLETED | OUTPATIENT
Start: 2023-04-19 | End: 2023-04-19

## 2023-04-19 RX ORDER — ACETAMINOPHEN 500 MG
650 TABLET ORAL ONCE
Refills: 0 | Status: COMPLETED | OUTPATIENT
Start: 2023-04-19 | End: 2023-04-19

## 2023-04-19 RX ADMIN — FAMOTIDINE 20 MILLIGRAM(S): 10 INJECTION INTRAVENOUS at 13:05

## 2023-04-19 RX ADMIN — Medication 4 MILLIGRAM(S): at 12:35

## 2023-04-19 RX ADMIN — Medication 650 MILLIGRAM(S): at 13:06

## 2023-04-19 RX ADMIN — Medication 202 MILLIGRAM(S): at 12:35

## 2023-04-19 RX ADMIN — FAMOTIDINE 208 MILLIGRAM(S): 10 INJECTION INTRAVENOUS at 12:50

## 2023-04-19 RX ADMIN — PACLITAXEL 170 MILLIGRAM(S): 6 INJECTION, SOLUTION, CONCENTRATE INTRAVENOUS at 14:11

## 2023-04-19 RX ADMIN — Medication 650 MILLIGRAM(S): at 12:26

## 2023-04-19 RX ADMIN — PACLITAXEL 170 MILLIGRAM(S): 6 INJECTION, SOLUTION, CONCENTRATE INTRAVENOUS at 13:10

## 2023-04-19 RX ADMIN — Medication 204 MILLIGRAM(S): at 12:20

## 2023-04-19 RX ADMIN — Medication 25 MILLIGRAM(S): at 12:50

## 2023-04-19 NOTE — PHYSICAL EXAM
[Fully active, able to carry on all pre-disease performance without restriction] : Status 0 - Fully active, able to carry on all pre-disease performance without restriction [Normal] : affect appropriate [de-identified] : about 3cm palpable hard mass in right breast ~10 clock position 10cm from nipple, no palpable axillary nodes -- about 1cm mass in same area of palpable concern on exam 4/19/23

## 2023-04-19 NOTE — HISTORY OF PRESENT ILLNESS
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 Here today for carbo/taxol week 4 \par \par Patient states she first palpated the right breast mass sometime in 2022 which prompted her to seek medical care. Patient's PCP Dr. Radha Sarah sent patient for imaging which yielded a 2.1cm lobulated heterogeneous mass on sonogram 10:00 10FN. She has no prior hx of breast surgery or bx. Denies family history of breast or ovarian cancer. Patient denies skin changes or nipple discharge bilaterally. \par \par 22: B/l MG ([facility])- heterogeneously dense. ROCKY. BI-RADS 2\par 23: R MG & B/l US (R palpable mass)- heterogenously dense. R 2.6 cm mass w/ heterogeneous calcs UOQ (palpable). US- R 2.1cm lobulated heterogeneous mass w/ calcs 10:00 10FN (palpable- rec US bx). L 0.6cm cyst 5:00 6FN. BI-RADS 4\par 23: R US bx 2.3cm mass 10:00 10FN (heart clip)- IDC (poorly differentiated), ER- (0%), AR- (0%), HER2- (0). Concordant- recommend definitive surgical and oncological management. \par \par OBhx:\par Menarche 15yo\par LMP s/p partial hysterectomy at age 47yo\par  did not breastfeed, age at first birth 20yo. \par Denies h/o fertility treatments, denies h/o HRT and OCP [de-identified] : Reports only palpable right breast mass (which is getting smaller) w/o any other breast related complaints. Patient states she feels well. \par \par Pt started weekly carbo/taxol with pembro on Wed 3/29. Returns for week 4 of treatment today. \par States has very mild numbness/tingling in both her toes and finger tips (more in her finger tips), denies it affecting her function and states this started this weekend. States never had this in past even though DM. Denies it affecting her function.

## 2023-04-19 NOTE — ASSESSMENT
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 \par \par Patient states she first palpated the right breast mass sometime in February 2022 which prompted her to seek medical care. Patient's PCP Dr. Radha Sarah sent patient for imaging which yielded a 2.1cm lobulated heterogeneous mass on sonogram 10:00 10FN. She has no prior hx of breast surgery or bx. Denies family history of breast or ovarian cancer. Patient denies skin changes or nipple discharge bilaterally. \par \par Reviewed clinical utility of neoadjuvant chemoimmunotherapy as per the Keynote 522 clinical trial - recommending starting weekly carbo/taxol x12 followed by adriamycin/cytoxan every 2 weeks with continued pembrolizumab every 6 weeks throughout duration of chemotherapy IV through chemoport with growth factor support (Neulasta onbody injector) after AC. \par Reviewed potential side effects of chemotherapy including but not limited to allergic reactions/infusion reactions, bone marrow suppression leading to fatigue and increased risk of infection and febrile neutropenia,anemia and thrombocytopenia needing supportive transfusions,hair loss, skin/nail changes, nausea/vomiting, stomatitis, abdominal pain, diarrhea/constipation, kidney and liver dysfunction with electrolyte disturbances, peripheral neuropathy, small risk of cardiomyopathy and secondary malignancies (ie hematologic malignancies due to chemotherapy). Also discussed potential side effects of immunotherapy (pembro) including inflammation of colon leading to colitis, inflammation of lining of lung or heart for example or other organs leading to pleuritis and pericarditis respectively, also other possible conditions such as hypophysitis or pancreatitis; reviewed treatment in such a case would involve stopping of the offending agent and initiating steroids. \par \par Re-reviewed above risks of treatment, patient had all her questions answered and expressed verbal understanding and agreement to proceed with treatment. She signed a consent form. \par \par \par 4/19/23 To proceed with week 4of treatment but leaving out carboplatin today given ANC of 1.6, to proceed only with Taxol going forward; Pembro was given on 3/29 and will continue every 6 wks.\par Compazine 10mg PO Q6hrs as needed for nausea/vomiting. \par \par RTC in one week for treatment (Taxol alone, needs CBC w/ diff and CMP prior to chemo, with TSH prior to pembro), next follow up in two weeks.

## 2023-04-22 LAB
ALBUMIN SERPL ELPH-MCNC: 3.6 G/DL
ALP BLD-CCNC: 60 U/L
ALT SERPL-CCNC: 38 U/L
ANION GAP SERPL CALC-SCNC: 3 MMOL/L
AST SERPL-CCNC: 30 U/L
BILIRUB SERPL-MCNC: 0.4 MG/DL
BUN SERPL-MCNC: 15 MG/DL
CALCIUM SERPL-MCNC: 9.7 MG/DL
CHLORIDE SERPL-SCNC: 113 MMOL/L
CO2 SERPL-SCNC: 25 MMOL/L
CREAT SERPL-MCNC: 1.2 MG/DL
EGFR: 53 ML/MIN/1.73M2
GLUCOSE SERPL-MCNC: 144 MG/DL
POTASSIUM SERPL-SCNC: 4.7 MMOL/L
PROT SERPL-MCNC: 7.6 G/DL
SODIUM SERPL-SCNC: 141 MMOL/L

## 2023-04-25 ENCOUNTER — APPOINTMENT (OUTPATIENT)
Dept: ENDOCRINOLOGY | Facility: CLINIC | Age: 58
End: 2023-04-25
Payer: MEDICAID

## 2023-04-25 VITALS
BODY MASS INDEX: 37.43 KG/M2 | WEIGHT: 239 LBS | HEART RATE: 99 BPM | DIASTOLIC BLOOD PRESSURE: 80 MMHG | SYSTOLIC BLOOD PRESSURE: 150 MMHG

## 2023-04-25 DIAGNOSIS — Z13.820 ENCOUNTER FOR SCREENING FOR OSTEOPOROSIS: ICD-10-CM

## 2023-04-25 LAB — GLUCOSE BLDC GLUCOMTR-MCNC: 143

## 2023-04-25 PROCEDURE — 82962 GLUCOSE BLOOD TEST: CPT

## 2023-04-25 PROCEDURE — 99215 OFFICE O/P EST HI 40 MIN: CPT | Mod: 25

## 2023-04-25 NOTE — PHYSICAL EXAM
[Alert] : alert [Healthy Appearance] : healthy appearance [No Acute Distress] : no acute distress [Normal Sclera/Conjunctiva] : normal sclera/conjunctiva [Normal Hearing] : hearing was normal [No Neck Mass] : no neck mass was observed [No LAD] : no lymphadenopathy [Supple] : the neck was supple [Thyroid Not Enlarged] : the thyroid was not enlarged [Well Healed Scar] : well healed scar [No Respiratory Distress] : no respiratory distress [No Stigmata of Cushings Syndrome] : no stigmata of Cushings Syndrome [Normal Gait] : normal gait [Right foot was examined, including] : right foot ~C was examined, including visual inspection with sensory and pulse exams [Left foot was examined, including] : left foot ~C was examined, including visual inspection with sensory and pulse exams [Normal] : normal [2+] : 2+ in the dorsalis pedis [Normal Insight/Judgement] : insight and judgment were intact [Kyphosis] : no kyphosis present [Acanthosis Nigricans] : no acanthosis nigricans [Diminished Throughout Both Feet] : normal tactile sensation with monofilament testing throughout both feet [de-identified] : no palpable thyroid tissue [de-identified] : no moon facies, no supraclavicular fat pads

## 2023-04-25 NOTE — HISTORY OF PRESENT ILLNESS
[FreeTextEntry1] : Ms. 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 I saw her for an initial visit in June 2018 and last in March 2019; she saw Lucy Maldonado NP in September 2022.\par \par Papillary/follicular thyroid cancer with postsurgical hypothyroidism.\par She has a history of total thyroidectomy in March 2017. She did not follow-up for review of pathology and was unaware of her diagnosis of thyroid cancer. Review of pathology findings remarkable for: - Papillary thyroid carcinoma, multifocal (3 foci). - Follicular variant of papillary carcinoma, infiltrative,  0.5 cm focus in right lobe upper pole. - Tumor exhibits limited extrathyroidal extension, and involves inked margins at superior pole. - Follicular variant of papillary carcinoma, two foci in  left lobe, confined to the thyroid, a 1.3 cm circumscribed nodule  in mid pole, and a 0.5 cm infiltrative focus in lower pole. - Five central compartment lymph nodes, negative for  metastasis (0/5).\par She is status post I-131 30 mCi in July 2018. Posttreatment scan of the neck and chest showed activity in the neck but no evidence of metastatic disease.\par Thyroglobulin undetectable with negative antibodies in September 2022. No recent neck imaging. \par She is currently taking levothyroxine 275 mcg daily. Transglutaminase antibodies were previously checked due to high levothyroxine requirements and were negative.\par She is taking levothyroxine in the morning, on an empty stomach, with plain water, and waiting at least 30 minutes before eating. She is not taking calcium/iron/multivitamin.\par Niece with history of goiter. No other family history of thyroid disease.\par \par Type 2 diabetes mellitus. HbA1c 8.2% in September 2022 and glucose 143 mg/dL today. Elevated urine microalbumin. \par She was diagnosed with diabetes around 2013.\par She was taking metformin 500 mg daily at the time of her initial visit. In June we adjusted her dose up to 1500 mg daily. She did not tolerate 2000 mg daily.\par She is currently taking metformin ER 1000 mg twice daily and Jardiance 25 mg daily.\par She is not checking blood sugars\par She is on aspirin\par She is on a blood pressure regimen\par She is on a statin for cholesterol\par Nephropathy screening: Treated with an ACE inhibitor\par Last ophthalmology appointment: February 2023; upcoming appointment in May for dilated eye examination\par Last dental appointment: Within six months\par \par Interim History \par She is currently taking metformin ER 1000 mg twice daily and Jardiance 25 mg daily. She states recent point-of-care HbA1c 8.7% through her primary care provider.\par She was recently diagnosed with triple negative breast cancer and has completed 5 of 12 planned chemotherapy cycles. She had been receiving dexamethasone with her chemotherapy; no plan for further steroid therapy due to hyperglycemia and leukopenia. After chemotherapy is complete she will have surgery and subsequent radiation therapy. \par No chest pain, shortness of breath, polyuria/polydipsia, lower extremity numbness/tingling. She has a 16 year-old grandson and 4 year-old granddaughter.\par Medical and surgical history, medications, allergies, social and family history reviewed and updated as needed.

## 2023-04-25 NOTE — ASSESSMENT
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 Stage 1, T3N0M0 per pathology. She is status post total thyroidectomy in March 2017. She was unaware of her pathology until her initial visit here, significant for: papillary thyroid carcinoma, multifocal (3 foci); follicular variant of papillary carcinoma, infiltrative,  0.5 cm focus in right lobe upper pole with limited extrathyroidal extension and positive margins; follicular variant of papillary carcinoma, two foci in  left lobe, confined to the thyroid, a 1.3 cm circumscribed nodule  in mid pole, and a 0.5 cm infiltrative focus in lower pole; no positive lymph nodes. She is status post I-131 30 mCi in July 2018. She has been biochemically euthyroid. Thyroglobulin undetectable with negative antibodies in September 2022. No recent neck imaging. \par Continue levothyroxine 275 mcg daily for goal TSH 0.5-2.0 uIU/mL\par Monitor neck ultrasound after chemotherapy complete\par \par Type 2 diabetes mellitus. HbA1c 8.2% in September 2022 and glucose 143 mg/dL today. Elevated urine microalbumin. We discussed the cardiovascular and microvascular complications of uncontrolled diabetes. We discussed the importance of diet and exercise and lifestyle modification for glycemic control and weight loss. We discussed pharmacologic options for glycemic control and weight loss. We will start Januvia for now, with plan for initiation of a GLP-1 receptor agonist after chemotherapy is complete. We discussed the risks and benefits of the GLP-1 receptor agonist class, including but not limited to nausea, pancreatitis, medullary thyroid cancer. \par Continue metformin ER 1000 mg twice daily\par Continue Jardiance to 25 mg daily\par Start Januvia 100 mg daily\par She is on a blood pressure regimen; blood pressure elevated today but has been around goal and will monitor\par She is on a statin for cholesterol; last lipid panel around goal\par Last ophthalmology appointment: February 2023; upcoming appointment in May for dilated eye examination\par Last dental appointment: Within six months\par \par Screening for osteoporosis. Obtain baseline dual energy X-ray absorptiometry once chemotherapy complete. \par \par Return to see Lucy Maldonado NP in 2 months.\par \par CC:\par Dr. Radha Sarah, Fax [phone]

## 2023-04-26 ENCOUNTER — APPOINTMENT (OUTPATIENT)
Dept: INFUSION THERAPY | Facility: CLINIC | Age: 58
End: 2023-04-26

## 2023-04-26 ENCOUNTER — OUTPATIENT (OUTPATIENT)
Dept: OUTPATIENT SERVICES | Facility: HOSPITAL | Age: 58
LOS: 1 days | End: 2023-04-26
Payer: MEDICAID

## 2023-04-26 VITALS
HEART RATE: 86 BPM | DIASTOLIC BLOOD PRESSURE: 81 MMHG | SYSTOLIC BLOOD PRESSURE: 139 MMHG | OXYGEN SATURATION: 98 % | RESPIRATION RATE: 16 BRPM | TEMPERATURE: 99 F

## 2023-04-26 VITALS
RESPIRATION RATE: 18 BRPM | WEIGHT: 227.08 LBS | SYSTOLIC BLOOD PRESSURE: 128 MMHG | TEMPERATURE: 98 F | HEIGHT: 67 IN | HEART RATE: 78 BPM | DIASTOLIC BLOOD PRESSURE: 82 MMHG | OXYGEN SATURATION: 99 %

## 2023-04-26 DIAGNOSIS — C50.919 MALIGNANT NEOPLASM OF UNSPECIFIED SITE OF UNSPECIFIED FEMALE BREAST: ICD-10-CM

## 2023-04-26 DIAGNOSIS — Z98.891 HISTORY OF UTERINE SCAR FROM PREVIOUS SURGERY: Chronic | ICD-10-CM

## 2023-04-26 DIAGNOSIS — Z90.710 ACQUIRED ABSENCE OF BOTH CERVIX AND UTERUS: Chronic | ICD-10-CM

## 2023-04-26 DIAGNOSIS — Z98.890 OTHER SPECIFIED POSTPROCEDURAL STATES: Chronic | ICD-10-CM

## 2023-04-26 LAB
ALBUMIN SERPL ELPH-MCNC: 3.3 G/DL — SIGNIFICANT CHANGE UP (ref 3.3–5)
ALP SERPL-CCNC: 70 U/L — SIGNIFICANT CHANGE UP (ref 40–120)
ALT FLD-CCNC: 26 U/L — SIGNIFICANT CHANGE UP (ref 10–45)
ANION GAP SERPL CALC-SCNC: 11 MMOL/L — SIGNIFICANT CHANGE UP (ref 5–17)
AST SERPL-CCNC: 26 U/L — SIGNIFICANT CHANGE UP (ref 10–40)
BILIRUB SERPL-MCNC: 0.4 MG/DL — SIGNIFICANT CHANGE UP (ref 0.2–1.2)
BUN SERPL-MCNC: 17 MG/DL — SIGNIFICANT CHANGE UP (ref 7–23)
CALCIUM SERPL-MCNC: 9.7 MG/DL — SIGNIFICANT CHANGE UP (ref 8.4–10.5)
CHLORIDE SERPL-SCNC: 107 MMOL/L — SIGNIFICANT CHANGE UP (ref 96–108)
CO2 SERPL-SCNC: 26 MMOL/L — SIGNIFICANT CHANGE UP (ref 22–31)
CREAT SERPL-MCNC: 1 MG/DL — SIGNIFICANT CHANGE UP (ref 0.5–1.3)
EGFR: 66 ML/MIN/1.73M2 — SIGNIFICANT CHANGE UP
GLUCOSE SERPL-MCNC: 195 MG/DL — HIGH (ref 70–99)
HCT VFR BLD CALC: 33 % — LOW (ref 34.5–45)
HGB BLD-MCNC: 10.1 G/DL — LOW (ref 11.5–15.5)
LYMPHOCYTES # BLD AUTO: 2.3 K/UL — SIGNIFICANT CHANGE UP (ref 1–3.3)
LYMPHOCYTES # BLD AUTO: 41.9 % — SIGNIFICANT CHANGE UP (ref 13–44)
MCHC RBC-ENTMCNC: 26.9 PG — LOW (ref 27–34)
MCHC RBC-ENTMCNC: 30.6 GM/DL — LOW (ref 32–36)
MCV RBC AUTO: 87.8 FL — SIGNIFICANT CHANGE UP (ref 80–100)
NEUTROPHILS # BLD AUTO: 3 K/UL — SIGNIFICANT CHANGE UP (ref 1.8–7.4)
NEUTROPHILS NFR BLD AUTO: 52.4 % — SIGNIFICANT CHANGE UP (ref 43–77)
PLATELET # BLD AUTO: 290 K/UL — SIGNIFICANT CHANGE UP (ref 150–400)
POTASSIUM SERPL-MCNC: 3.6 MMOL/L — SIGNIFICANT CHANGE UP (ref 3.5–5.3)
POTASSIUM SERPL-SCNC: 3.6 MMOL/L — SIGNIFICANT CHANGE UP (ref 3.5–5.3)
PROT SERPL-MCNC: 7.5 G/DL — SIGNIFICANT CHANGE UP (ref 6–8.3)
RBC # BLD: 3.76 M/UL — LOW (ref 3.8–5.2)
RBC # FLD: 14.3 % — SIGNIFICANT CHANGE UP (ref 10.3–14.5)
SODIUM SERPL-SCNC: 144 MMOL/L — SIGNIFICANT CHANGE UP (ref 135–145)
T4 AB SER-ACNC: 10.44 UG/DL — SIGNIFICANT CHANGE UP (ref 4.5–11.7)
TSH SERPL-MCNC: 1.65 UIU/ML — SIGNIFICANT CHANGE UP (ref 0.27–4.2)
WBC # BLD: 5.6 K/UL — SIGNIFICANT CHANGE UP (ref 3.8–10.5)
WBC # FLD AUTO: 5.6 K/UL — SIGNIFICANT CHANGE UP (ref 3.8–10.5)

## 2023-04-26 PROCEDURE — 84443 ASSAY THYROID STIM HORMONE: CPT

## 2023-04-26 PROCEDURE — 85025 COMPLETE CBC W/AUTO DIFF WBC: CPT

## 2023-04-26 PROCEDURE — 36415 COLL VENOUS BLD VENIPUNCTURE: CPT

## 2023-04-26 PROCEDURE — 96375 TX/PRO/DX INJ NEW DRUG ADDON: CPT

## 2023-04-26 PROCEDURE — 80053 COMPREHEN METABOLIC PANEL: CPT

## 2023-04-26 PROCEDURE — 96413 CHEMO IV INFUSION 1 HR: CPT

## 2023-04-26 PROCEDURE — 84436 ASSAY OF TOTAL THYROXINE: CPT

## 2023-04-26 RX ORDER — FAMOTIDINE 10 MG/ML
20 INJECTION INTRAVENOUS ONCE
Refills: 0 | Status: COMPLETED | OUTPATIENT
Start: 2023-04-26 | End: 2023-04-26

## 2023-04-26 RX ORDER — DIPHENHYDRAMINE HCL 50 MG
25 CAPSULE ORAL ONCE
Refills: 0 | Status: COMPLETED | OUTPATIENT
Start: 2023-04-26 | End: 2023-04-26

## 2023-04-26 RX ORDER — DEXAMETHASONE 0.5 MG/5ML
4 ELIXIR ORAL ONCE
Refills: 0 | Status: COMPLETED | OUTPATIENT
Start: 2023-04-26 | End: 2023-04-26

## 2023-04-26 RX ORDER — PACLITAXEL 6 MG/ML
170 INJECTION, SOLUTION, CONCENTRATE INTRAVENOUS ONCE
Refills: 0 | Status: COMPLETED | OUTPATIENT
Start: 2023-04-26 | End: 2023-04-26

## 2023-04-26 RX ADMIN — Medication 650 MILLIGRAM(S): at 13:43

## 2023-04-26 RX ADMIN — PACLITAXEL 170 MILLIGRAM(S): 6 INJECTION, SOLUTION, CONCENTRATE INTRAVENOUS at 14:26

## 2023-04-26 RX ADMIN — Medication 202 MILLIGRAM(S): at 13:55

## 2023-04-26 RX ADMIN — Medication 650 MILLIGRAM(S): at 13:59

## 2023-04-26 RX ADMIN — FAMOTIDINE 20 MILLIGRAM(S): 10 INJECTION INTRAVENOUS at 14:25

## 2023-04-26 RX ADMIN — Medication 4 MILLIGRAM(S): at 13:55

## 2023-04-26 RX ADMIN — Medication 204 MILLIGRAM(S): at 13:40

## 2023-04-26 RX ADMIN — PACLITAXEL 170 MILLIGRAM(S): 6 INJECTION, SOLUTION, CONCENTRATE INTRAVENOUS at 15:24

## 2023-04-26 RX ADMIN — FAMOTIDINE 208 MILLIGRAM(S): 10 INJECTION INTRAVENOUS at 14:10

## 2023-04-26 RX ADMIN — Medication 25 MILLIGRAM(S): at 14:10

## 2023-05-02 ENCOUNTER — NON-APPOINTMENT (OUTPATIENT)
Age: 58
End: 2023-05-02

## 2023-05-03 ENCOUNTER — OUTPATIENT (OUTPATIENT)
Dept: OUTPATIENT SERVICES | Facility: HOSPITAL | Age: 58
LOS: 1 days | End: 2023-05-03
Payer: MEDICAID

## 2023-05-03 ENCOUNTER — NON-APPOINTMENT (OUTPATIENT)
Age: 58
End: 2023-05-03

## 2023-05-03 ENCOUNTER — LABORATORY RESULT (OUTPATIENT)
Age: 58
End: 2023-05-03

## 2023-05-03 ENCOUNTER — APPOINTMENT (OUTPATIENT)
Dept: HEMATOLOGY ONCOLOGY | Facility: CLINIC | Age: 58
End: 2023-05-03
Payer: MEDICAID

## 2023-05-03 ENCOUNTER — APPOINTMENT (OUTPATIENT)
Dept: INFUSION THERAPY | Facility: CLINIC | Age: 58
End: 2023-05-03

## 2023-05-03 VITALS
DIASTOLIC BLOOD PRESSURE: 87 MMHG | HEIGHT: 67 IN | SYSTOLIC BLOOD PRESSURE: 162 MMHG | BODY MASS INDEX: 35.47 KG/M2 | HEART RATE: 110 BPM | TEMPERATURE: 98.2 F | OXYGEN SATURATION: 98 % | WEIGHT: 226 LBS | RESPIRATION RATE: 18 BRPM

## 2023-05-03 VITALS
HEART RATE: 100 BPM | TEMPERATURE: 98 F | SYSTOLIC BLOOD PRESSURE: 162 MMHG | WEIGHT: 225.97 LBS | HEIGHT: 67 IN | OXYGEN SATURATION: 99 % | RESPIRATION RATE: 18 BRPM | DIASTOLIC BLOOD PRESSURE: 87 MMHG

## 2023-05-03 VITALS
RESPIRATION RATE: 18 BRPM | SYSTOLIC BLOOD PRESSURE: 165 MMHG | OXYGEN SATURATION: 99 % | HEART RATE: 89 BPM | TEMPERATURE: 98 F | DIASTOLIC BLOOD PRESSURE: 74 MMHG

## 2023-05-03 DIAGNOSIS — Z90.710 ACQUIRED ABSENCE OF BOTH CERVIX AND UTERUS: Chronic | ICD-10-CM

## 2023-05-03 DIAGNOSIS — Z98.891 HISTORY OF UTERINE SCAR FROM PREVIOUS SURGERY: Chronic | ICD-10-CM

## 2023-05-03 DIAGNOSIS — C50.919 MALIGNANT NEOPLASM OF UNSPECIFIED SITE OF UNSPECIFIED FEMALE BREAST: ICD-10-CM

## 2023-05-03 DIAGNOSIS — Z98.890 OTHER SPECIFIED POSTPROCEDURAL STATES: Chronic | ICD-10-CM

## 2023-05-03 PROCEDURE — 96375 TX/PRO/DX INJ NEW DRUG ADDON: CPT

## 2023-05-03 PROCEDURE — 99214 OFFICE O/P EST MOD 30 MIN: CPT | Mod: 25

## 2023-05-03 PROCEDURE — 36415 COLL VENOUS BLD VENIPUNCTURE: CPT

## 2023-05-03 PROCEDURE — 96413 CHEMO IV INFUSION 1 HR: CPT

## 2023-05-03 RX ORDER — DEXAMETHASONE 0.5 MG/5ML
4 ELIXIR ORAL ONCE
Refills: 0 | Status: COMPLETED | OUTPATIENT
Start: 2023-05-03 | End: 2023-05-03

## 2023-05-03 RX ORDER — FAMOTIDINE 10 MG/ML
20 INJECTION INTRAVENOUS ONCE
Refills: 0 | Status: COMPLETED | OUTPATIENT
Start: 2023-05-03 | End: 2023-05-03

## 2023-05-03 RX ORDER — PACLITAXEL 6 MG/ML
170 INJECTION, SOLUTION, CONCENTRATE INTRAVENOUS ONCE
Refills: 0 | Status: COMPLETED | OUTPATIENT
Start: 2023-05-03 | End: 2023-05-03

## 2023-05-03 RX ORDER — DIPHENHYDRAMINE HCL 50 MG
25 CAPSULE ORAL ONCE
Refills: 0 | Status: COMPLETED | OUTPATIENT
Start: 2023-05-03 | End: 2023-05-03

## 2023-05-03 RX ORDER — ACETAMINOPHEN 500 MG
650 TABLET ORAL ONCE
Refills: 0 | Status: COMPLETED | OUTPATIENT
Start: 2023-05-03 | End: 2023-05-03

## 2023-05-03 RX ADMIN — Medication 25 MILLIGRAM(S): at 13:00

## 2023-05-03 RX ADMIN — PACLITAXEL 170 MILLIGRAM(S): 6 INJECTION, SOLUTION, CONCENTRATE INTRAVENOUS at 14:55

## 2023-05-03 RX ADMIN — Medication 202 MILLIGRAM(S): at 12:45

## 2023-05-03 RX ADMIN — PACLITAXEL 170 MILLIGRAM(S): 6 INJECTION, SOLUTION, CONCENTRATE INTRAVENOUS at 13:48

## 2023-05-03 RX ADMIN — Medication 4 MILLIGRAM(S): at 12:45

## 2023-05-03 RX ADMIN — Medication 650 MILLIGRAM(S): at 12:15

## 2023-05-03 RX ADMIN — FAMOTIDINE 208 MILLIGRAM(S): 10 INJECTION INTRAVENOUS at 13:00

## 2023-05-03 RX ADMIN — Medication 204 MILLIGRAM(S): at 12:30

## 2023-05-03 RX ADMIN — FAMOTIDINE 20 MILLIGRAM(S): 10 INJECTION INTRAVENOUS at 13:15

## 2023-05-03 RX ADMIN — Medication 650 MILLIGRAM(S): at 12:50

## 2023-05-03 NOTE — ASSESSMENT
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 \par \par Patient states she first palpated the right breast mass sometime in February 2022 which prompted her to seek medical care. Patient's PCP Dr. Radha Sarah sent patient for imaging which yielded a 2.1cm lobulated heterogeneous mass on sonogram 10:00 10FN. She has no prior hx of breast surgery or bx. Denies family history of breast or ovarian cancer. Patient denies skin changes or nipple discharge bilaterally. \par \par Reviewed clinical utility of neoadjuvant chemoimmunotherapy as per the Keynote 522 clinical trial - recommending starting weekly carbo/taxol x12 followed by adriamycin/cytoxan every 2 weeks with continued pembrolizumab every 6 weeks throughout duration of chemotherapy IV through chemoport with growth factor support (Neulasta onbody injector) after AC. \par Reviewed potential side effects of chemotherapy including but not limited to allergic reactions/infusion reactions, bone marrow suppression leading to fatigue and increased risk of infection and febrile neutropenia,anemia and thrombocytopenia needing supportive transfusions,hair loss, skin/nail changes, nausea/vomiting, stomatitis, abdominal pain, diarrhea/constipation, kidney and liver dysfunction with electrolyte disturbances, peripheral neuropathy, small risk of cardiomyopathy and secondary malignancies (ie hematologic malignancies due to chemotherapy). Also discussed potential side effects of immunotherapy (pembro) including inflammation of colon leading to colitis, inflammation of lining of lung or heart for example or other organs leading to pleuritis and pericarditis respectively, also other possible conditions such as hypophysitis or pancreatitis; reviewed treatment in such a case would involve stopping of the offending agent and initiating steroids. \par \par Re-reviewed above risks of treatment, patient had all her questions answered and expressed verbal understanding and agreement to proceed with treatment. She signed a consent form. \par \par \par 5/3/23 To proceed with week 6 of treatment (off carboplatin due to neutropenia with week 4 of treatment) to proceed with Taxol; Pembro was given on 3/29 and will continue every 6 wks.\par Compazine 10mg PO Q6hrs as needed for nausea/vomiting. \par Will continue to monitor peripheral neuropathy affecting her hands, for now at grade 1. \par \par RTC in one week for treatment (Taxol alone, needs CBC w/ diff and CMP prior to chemo, with TSH prior to pembro), next follow up in two weeks.

## 2023-05-03 NOTE — DISCUSSION/SUMMARY
[Home] : at home, [unfilled] , at the time of the visit. [FreeTextEntry1] : Patient called with a few complaints, likely all related to chemo with hair loss, scalp tenderness/pain, brief epistaxis episodes, also numbness/tingling in both hands but more on L, also some shoulder and elbow pain. I recommended Tylenol prn (max 4g/24hr), Ayr nasal gel and close observation at numbness/tingling reviewing that treatment on Wed may be held due to the likely neuropathy. Reviewed reasons for ER visit - chest pain, sob, pires. She expressed verbal understanding and had all questions answered.

## 2023-05-03 NOTE — PHYSICAL EXAM
[Fully active, able to carry on all pre-disease performance without restriction] : Status 0 - Fully active, able to carry on all pre-disease performance without restriction [Normal] : affect appropriate [de-identified] : about 3cm palpable hard mass in right breast ~10 clock position 10cm from nipple, no palpable axillary nodes -- about 1cm mass in same area of palpable concern on exam 4/19/23

## 2023-05-03 NOTE — HISTORY OF PRESENT ILLNESS
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 Here today for f/u and tx. \par \par Patient states she first palpated the right breast mass sometime in 2022 which prompted her to seek medical care. Patient's PCP Dr. Radha Sarah sent patient for imaging which yielded a 2.1cm lobulated heterogeneous mass on sonogram 10:00 10FN. She has no prior hx of breast surgery or bx. Denies family history of breast or ovarian cancer. Patient denies skin changes or nipple discharge bilaterally. \par \par 22: B/l MG (LHR)- heterogeneously dense. ROCKY. BI-RADS 2\par 23: R MG & B/l US (R palpable mass)- heterogenously dense. R 2.6 cm mass w/ heterogeneous calcs UOQ (palpable). US- R 2.1cm lobulated heterogeneous mass w/ calcs 10:00 10FN (palpable- rec US bx). L 0.6cm cyst 5:00 6FN. BI-RADS 4\par 23: R US bx 2.3cm mass 10:00 10FN (heart clip)- IDC (poorly differentiated), ER- (0%), MD- (0%), HER2- (0). Concordant- recommend definitive surgical and oncological management. \par \par OBhx:\par Menarche 17yo\par LMP s/p partial hysterectomy at age 47yo\par  did not breastfeed, age at first birth 20yo. \par Denies h/o fertility treatments, denies h/o HRT and OCP [de-identified] :  Patient states she feels well. Numbness/tingling is present, mild in her fingers only. States was worse last week and affected her hand more, but now only in fingertips. Denies it affecting her function. Denies it affecting her feet. \par \par Pt started weekly carbo/taxol with pembro on Wed 3/29. Returns for week 6 of treatment today. \par

## 2023-05-06 LAB
ALBUMIN SERPL ELPH-MCNC: 3.4 G/DL
ALP BLD-CCNC: 63 U/L
ALT SERPL-CCNC: 29 U/L
ANION GAP SERPL CALC-SCNC: 3 MMOL/L
AST SERPL-CCNC: 25 U/L
BILIRUB SERPL-MCNC: 0.5 MG/DL
BUN SERPL-MCNC: 14 MG/DL
CALCIUM SERPL-MCNC: 9.6 MG/DL
CHLORIDE SERPL-SCNC: 107 MMOL/L
CO2 SERPL-SCNC: 30 MMOL/L
CREAT SERPL-MCNC: 1 MG/DL
EGFR: 66 ML/MIN/1.73M2
GLUCOSE SERPL-MCNC: 233 MG/DL
MAGNESIUM SERPL-MCNC: 1.6 MG/DL
POTASSIUM SERPL-SCNC: 4.2 MMOL/L
PROT SERPL-MCNC: 7.7 G/DL
SODIUM SERPL-SCNC: 140 MMOL/L
TSH SERPL-ACNC: 0.91 UIU/ML

## 2023-05-10 ENCOUNTER — APPOINTMENT (OUTPATIENT)
Dept: INFUSION THERAPY | Facility: CLINIC | Age: 58
End: 2023-05-10

## 2023-05-10 ENCOUNTER — OUTPATIENT (OUTPATIENT)
Dept: OUTPATIENT SERVICES | Facility: HOSPITAL | Age: 58
LOS: 1 days | End: 2023-05-10
Payer: MEDICAID

## 2023-05-10 VITALS
HEART RATE: 80 BPM | OXYGEN SATURATION: 99 % | SYSTOLIC BLOOD PRESSURE: 140 MMHG | TEMPERATURE: 98 F | DIASTOLIC BLOOD PRESSURE: 78 MMHG | RESPIRATION RATE: 16 BRPM

## 2023-05-10 VITALS
HEART RATE: 86 BPM | DIASTOLIC BLOOD PRESSURE: 80 MMHG | SYSTOLIC BLOOD PRESSURE: 134 MMHG | RESPIRATION RATE: 17 BRPM | OXYGEN SATURATION: 98 % | HEIGHT: 67 IN | TEMPERATURE: 98 F | WEIGHT: 225.09 LBS

## 2023-05-10 DIAGNOSIS — Z98.890 OTHER SPECIFIED POSTPROCEDURAL STATES: Chronic | ICD-10-CM

## 2023-05-10 DIAGNOSIS — Z90.710 ACQUIRED ABSENCE OF BOTH CERVIX AND UTERUS: Chronic | ICD-10-CM

## 2023-05-10 DIAGNOSIS — Z98.891 HISTORY OF UTERINE SCAR FROM PREVIOUS SURGERY: Chronic | ICD-10-CM

## 2023-05-10 DIAGNOSIS — C50.919 MALIGNANT NEOPLASM OF UNSPECIFIED SITE OF UNSPECIFIED FEMALE BREAST: ICD-10-CM

## 2023-05-10 LAB
ALBUMIN SERPL ELPH-MCNC: 3.8 G/DL — SIGNIFICANT CHANGE UP (ref 3.3–5)
ALP SERPL-CCNC: 55 U/L — SIGNIFICANT CHANGE UP (ref 40–120)
ALT FLD-CCNC: 40 U/L — SIGNIFICANT CHANGE UP (ref 10–45)
ANION GAP SERPL CALC-SCNC: 6 MMOL/L — SIGNIFICANT CHANGE UP (ref 5–17)
AST SERPL-CCNC: 37 U/L — SIGNIFICANT CHANGE UP (ref 10–40)
BILIRUB SERPL-MCNC: 0.6 MG/DL — SIGNIFICANT CHANGE UP (ref 0.2–1.2)
BUN SERPL-MCNC: 19 MG/DL — SIGNIFICANT CHANGE UP (ref 7–23)
CALCIUM SERPL-MCNC: 10.3 MG/DL — SIGNIFICANT CHANGE UP (ref 8.4–10.5)
CHLORIDE SERPL-SCNC: 106 MMOL/L — SIGNIFICANT CHANGE UP (ref 96–108)
CO2 SERPL-SCNC: 29 MMOL/L — SIGNIFICANT CHANGE UP (ref 22–31)
CREAT SERPL-MCNC: 0.9 MG/DL — SIGNIFICANT CHANGE UP (ref 0.5–1.3)
EGFR: 75 ML/MIN/1.73M2 — SIGNIFICANT CHANGE UP
GLUCOSE SERPL-MCNC: 178 MG/DL — HIGH (ref 70–99)
HCT VFR BLD CALC: 32.4 % — LOW (ref 34.5–45)
HGB BLD-MCNC: 10.2 G/DL — LOW (ref 11.5–15.5)
LYMPHOCYTES # BLD AUTO: 2.1 K/UL — SIGNIFICANT CHANGE UP (ref 1–3.3)
LYMPHOCYTES # BLD AUTO: 41.9 % — SIGNIFICANT CHANGE UP (ref 13–44)
MCHC RBC-ENTMCNC: 27.6 PG — SIGNIFICANT CHANGE UP (ref 27–34)
MCHC RBC-ENTMCNC: 31.5 GM/DL — LOW (ref 32–36)
MCV RBC AUTO: 87.6 FL — SIGNIFICANT CHANGE UP (ref 80–100)
NEUTROPHILS # BLD AUTO: 2.7 K/UL — SIGNIFICANT CHANGE UP (ref 1.8–7.4)
NEUTROPHILS NFR BLD AUTO: 53.4 % — SIGNIFICANT CHANGE UP (ref 43–77)
PLATELET # BLD AUTO: 316 K/UL — SIGNIFICANT CHANGE UP (ref 150–400)
POTASSIUM SERPL-MCNC: 4.2 MMOL/L — SIGNIFICANT CHANGE UP (ref 3.5–5.3)
POTASSIUM SERPL-SCNC: 4.2 MMOL/L — SIGNIFICANT CHANGE UP (ref 3.5–5.3)
PROT SERPL-MCNC: 8.1 G/DL — SIGNIFICANT CHANGE UP (ref 6–8.3)
RBC # BLD: 3.7 M/UL — LOW (ref 3.8–5.2)
RBC # FLD: 15 % — HIGH (ref 10.3–14.5)
SODIUM SERPL-SCNC: 141 MMOL/L — SIGNIFICANT CHANGE UP (ref 135–145)
T4 FREE SERPL-MCNC: 2.18 NG/DL — HIGH (ref 0.93–1.7)
TSH SERPL-MCNC: 1.1 UIU/ML — SIGNIFICANT CHANGE UP (ref 0.27–4.2)
WBC # BLD: 5 K/UL — SIGNIFICANT CHANGE UP (ref 3.8–10.5)
WBC # FLD AUTO: 5 K/UL — SIGNIFICANT CHANGE UP (ref 3.8–10.5)

## 2023-05-10 PROCEDURE — 80053 COMPREHEN METABOLIC PANEL: CPT

## 2023-05-10 PROCEDURE — 84443 ASSAY THYROID STIM HORMONE: CPT

## 2023-05-10 PROCEDURE — 96413 CHEMO IV INFUSION 1 HR: CPT

## 2023-05-10 PROCEDURE — 36415 COLL VENOUS BLD VENIPUNCTURE: CPT

## 2023-05-10 PROCEDURE — 96375 TX/PRO/DX INJ NEW DRUG ADDON: CPT

## 2023-05-10 PROCEDURE — 85025 COMPLETE CBC W/AUTO DIFF WBC: CPT

## 2023-05-10 PROCEDURE — 84439 ASSAY OF FREE THYROXINE: CPT

## 2023-05-10 PROCEDURE — 96417 CHEMO IV INFUS EACH ADDL SEQ: CPT

## 2023-05-10 RX ORDER — PEMBROLIZUMAB 25 MG/ML
400 INJECTION, SOLUTION INTRAVENOUS ONCE
Refills: 0 | Status: COMPLETED | OUTPATIENT
Start: 2023-05-10 | End: 2023-05-10

## 2023-05-10 RX ORDER — DIPHENHYDRAMINE HCL 50 MG
25 CAPSULE ORAL ONCE
Refills: 0 | Status: COMPLETED | OUTPATIENT
Start: 2023-05-10 | End: 2023-05-10

## 2023-05-10 RX ORDER — PACLITAXEL 6 MG/ML
170 INJECTION, SOLUTION, CONCENTRATE INTRAVENOUS ONCE
Refills: 0 | Status: COMPLETED | OUTPATIENT
Start: 2023-05-10 | End: 2023-05-10

## 2023-05-10 RX ORDER — FAMOTIDINE 10 MG/ML
20 INJECTION INTRAVENOUS ONCE
Refills: 0 | Status: COMPLETED | OUTPATIENT
Start: 2023-05-10 | End: 2023-05-10

## 2023-05-10 RX ORDER — ACETAMINOPHEN 500 MG
650 TABLET ORAL ONCE
Refills: 0 | Status: COMPLETED | OUTPATIENT
Start: 2023-05-10 | End: 2023-05-10

## 2023-05-10 RX ORDER — DEXAMETHASONE 0.5 MG/5ML
4 ELIXIR ORAL ONCE
Refills: 0 | Status: COMPLETED | OUTPATIENT
Start: 2023-05-10 | End: 2023-05-10

## 2023-05-10 RX ADMIN — Medication 650 MILLIGRAM(S): at 12:37

## 2023-05-10 RX ADMIN — Medication 204 MILLIGRAM(S): at 12:21

## 2023-05-10 RX ADMIN — Medication 25 MILLIGRAM(S): at 13:10

## 2023-05-10 RX ADMIN — FAMOTIDINE 20 MILLIGRAM(S): 10 INJECTION INTRAVENOUS at 12:52

## 2023-05-10 RX ADMIN — Medication 4 MILLIGRAM(S): at 12:36

## 2023-05-10 RX ADMIN — PEMBROLIZUMAB 400 MILLIGRAM(S): 25 INJECTION, SOLUTION INTRAVENOUS at 11:50

## 2023-05-10 RX ADMIN — PACLITAXEL 170 MILLIGRAM(S): 6 INJECTION, SOLUTION, CONCENTRATE INTRAVENOUS at 13:11

## 2023-05-10 RX ADMIN — FAMOTIDINE 208 MILLIGRAM(S): 10 INJECTION INTRAVENOUS at 12:37

## 2023-05-10 RX ADMIN — PEMBROLIZUMAB 400 MILLIGRAM(S): 25 INJECTION, SOLUTION INTRAVENOUS at 12:20

## 2023-05-10 RX ADMIN — Medication 202 MILLIGRAM(S): at 12:53

## 2023-05-10 RX ADMIN — PACLITAXEL 170 MILLIGRAM(S): 6 INJECTION, SOLUTION, CONCENTRATE INTRAVENOUS at 14:11

## 2023-05-10 RX ADMIN — Medication 650 MILLIGRAM(S): at 12:31

## 2023-05-12 NOTE — PHYSICAL EXAM
[Fully active, able to carry on all pre-disease performance without restriction] : Status 0 - Fully active, able to carry on all pre-disease performance without restriction [Normal] : affect appropriate [de-identified] : about 3cm palpable hard mass in right breast ~10 clock position 10cm from nipple, no palpable axillary nodes -- about 1cm mass in same area of palpable concern on exam 4/19/23

## 2023-05-12 NOTE — HISTORY OF PRESENT ILLNESS
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 Here today for f/u. \par \par Patient states she first palpated the right breast mass sometime in 2022 which prompted her to seek medical care. Patient's PCP Dr. Radha Sarah sent patient for imaging which yielded a 2.1cm lobulated heterogeneous mass on sonogram 10:00 10FN. She has no prior hx of breast surgery or bx. Denies family history of breast or ovarian cancer. Patient denies skin changes or nipple discharge bilaterally. \par \par 22: B/l MG (LHR)- heterogeneously dense. ROCKY. BI-RADS 2\par 23: R MG & B/l US (R palpable mass)- heterogenously dense. R 2.6 cm mass w/ heterogeneous calcs UOQ (palpable). US- R 2.1cm lobulated heterogeneous mass w/ calcs 10:00 10FN (palpable- rec US bx). L 0.6cm cyst 5:00 6FN. BI-RADS 4\par 23: R US bx 2.3cm mass 10:00 10FN (heart clip)- IDC (poorly differentiated), ER- (0%), AL- (0%), HER2- (0). Concordant- recommend definitive surgical and oncological management. \par \par OBhx:\par Menarche 17yo\par LMP s/p partial hysterectomy at age 45yo\par  did not breastfeed, age at first birth 20yo. \par Denies h/o fertility treatments, denies h/o HRT and OCP [de-identified] :  Patient states she feels well. Numbness/tingling is present, mild in her fingers only. States was worse last week and affected her hand more, but now only in fingertips. Denies it affecting her function. Denies it affecting her feet. \par \par Pt started weekly carbo/taxol with pembro on Wed 3/29. Returns for week 6 of treatment today. \par

## 2023-05-15 ENCOUNTER — APPOINTMENT (OUTPATIENT)
Dept: HEMATOLOGY ONCOLOGY | Facility: CLINIC | Age: 58
End: 2023-05-15
Payer: MEDICAID

## 2023-05-17 ENCOUNTER — APPOINTMENT (OUTPATIENT)
Dept: INFUSION THERAPY | Facility: CLINIC | Age: 58
End: 2023-05-17

## 2023-05-17 ENCOUNTER — APPOINTMENT (OUTPATIENT)
Dept: HEMATOLOGY ONCOLOGY | Facility: CLINIC | Age: 58
End: 2023-05-17
Payer: MEDICAID

## 2023-05-17 ENCOUNTER — OUTPATIENT (OUTPATIENT)
Dept: OUTPATIENT SERVICES | Facility: HOSPITAL | Age: 58
LOS: 1 days | End: 2023-05-17
Payer: MEDICAID

## 2023-05-17 VITALS
OXYGEN SATURATION: 96 % | WEIGHT: 225.97 LBS | HEART RATE: 102 BPM | RESPIRATION RATE: 18 BRPM | HEIGHT: 67 IN | TEMPERATURE: 98 F | DIASTOLIC BLOOD PRESSURE: 77 MMHG | SYSTOLIC BLOOD PRESSURE: 123 MMHG

## 2023-05-17 VITALS
SYSTOLIC BLOOD PRESSURE: 123 MMHG | HEART RATE: 102 BPM | TEMPERATURE: 98.2 F | OXYGEN SATURATION: 96 % | WEIGHT: 226 LBS | HEIGHT: 67 IN | RESPIRATION RATE: 18 BRPM | DIASTOLIC BLOOD PRESSURE: 77 MMHG | BODY MASS INDEX: 35.47 KG/M2

## 2023-05-17 DIAGNOSIS — Z90.710 ACQUIRED ABSENCE OF BOTH CERVIX AND UTERUS: Chronic | ICD-10-CM

## 2023-05-17 DIAGNOSIS — Z98.891 HISTORY OF UTERINE SCAR FROM PREVIOUS SURGERY: Chronic | ICD-10-CM

## 2023-05-17 DIAGNOSIS — C50.919 MALIGNANT NEOPLASM OF UNSPECIFIED SITE OF UNSPECIFIED FEMALE BREAST: ICD-10-CM

## 2023-05-17 DIAGNOSIS — Z98.890 OTHER SPECIFIED POSTPROCEDURAL STATES: Chronic | ICD-10-CM

## 2023-05-17 LAB
ALBUMIN SERPL ELPH-MCNC: 3.6 G/DL — SIGNIFICANT CHANGE UP (ref 3.3–5)
ALP SERPL-CCNC: 63 U/L — SIGNIFICANT CHANGE UP (ref 40–120)
ALT FLD-CCNC: 31 U/L — SIGNIFICANT CHANGE UP (ref 10–45)
ANION GAP SERPL CALC-SCNC: 13 MMOL/L — SIGNIFICANT CHANGE UP (ref 5–17)
AST SERPL-CCNC: 27 U/L — SIGNIFICANT CHANGE UP (ref 10–40)
BILIRUB SERPL-MCNC: 0.4 MG/DL — SIGNIFICANT CHANGE UP (ref 0.2–1.2)
BUN SERPL-MCNC: 17 MG/DL — SIGNIFICANT CHANGE UP (ref 7–23)
CALCIUM SERPL-MCNC: 9.9 MG/DL — SIGNIFICANT CHANGE UP (ref 8.4–10.5)
CHLORIDE SERPL-SCNC: 104 MMOL/L — SIGNIFICANT CHANGE UP (ref 96–108)
CO2 SERPL-SCNC: 27 MMOL/L — SIGNIFICANT CHANGE UP (ref 22–31)
CREAT SERPL-MCNC: 1.3 MG/DL — SIGNIFICANT CHANGE UP (ref 0.5–1.3)
EGFR: 48 ML/MIN/1.73M2 — LOW
GLUCOSE SERPL-MCNC: 193 MG/DL — HIGH (ref 70–99)
HCT VFR BLD CALC: 31 % — LOW (ref 34.5–45)
HGB BLD-MCNC: 9.8 G/DL — LOW (ref 11.5–15.5)
LYMPHOCYTES # BLD AUTO: 1.7 K/UL — SIGNIFICANT CHANGE UP (ref 1–3.3)
LYMPHOCYTES # BLD AUTO: 35.8 % — SIGNIFICANT CHANGE UP (ref 13–44)
MCHC RBC-ENTMCNC: 27.8 PG — SIGNIFICANT CHANGE UP (ref 27–34)
MCHC RBC-ENTMCNC: 31.6 GM/DL — LOW (ref 32–36)
MCV RBC AUTO: 87.8 FL — SIGNIFICANT CHANGE UP (ref 80–100)
NEUTROPHILS # BLD AUTO: 2.9 K/UL — SIGNIFICANT CHANGE UP (ref 1.8–7.4)
NEUTROPHILS NFR BLD AUTO: 59.5 % — SIGNIFICANT CHANGE UP (ref 43–77)
PLATELET # BLD AUTO: 351 K/UL — SIGNIFICANT CHANGE UP (ref 150–400)
POTASSIUM SERPL-MCNC: 4.2 MMOL/L — SIGNIFICANT CHANGE UP (ref 3.5–5.3)
POTASSIUM SERPL-SCNC: 4.2 MMOL/L — SIGNIFICANT CHANGE UP (ref 3.5–5.3)
PROT SERPL-MCNC: 7.8 G/DL — SIGNIFICANT CHANGE UP (ref 6–8.3)
RBC # BLD: 3.53 M/UL — LOW (ref 3.8–5.2)
RBC # FLD: 15.1 % — HIGH (ref 10.3–14.5)
SODIUM SERPL-SCNC: 144 MMOL/L — SIGNIFICANT CHANGE UP (ref 135–145)
T4 AB SER-ACNC: 14.73 UG/DL — HIGH (ref 4.5–11.7)
TSH SERPL-MCNC: 0.44 UIU/ML — SIGNIFICANT CHANGE UP (ref 0.27–4.2)
WBC # BLD: 4.8 K/UL — SIGNIFICANT CHANGE UP (ref 3.8–10.5)
WBC # FLD AUTO: 4.8 K/UL — SIGNIFICANT CHANGE UP (ref 3.8–10.5)

## 2023-05-17 PROCEDURE — 99214 OFFICE O/P EST MOD 30 MIN: CPT | Mod: 25

## 2023-05-17 PROCEDURE — 36415 COLL VENOUS BLD VENIPUNCTURE: CPT

## 2023-05-17 PROCEDURE — 84439 ASSAY OF FREE THYROXINE: CPT

## 2023-05-17 PROCEDURE — 84436 ASSAY OF TOTAL THYROXINE: CPT

## 2023-05-17 PROCEDURE — 84443 ASSAY THYROID STIM HORMONE: CPT

## 2023-05-17 PROCEDURE — 85025 COMPLETE CBC W/AUTO DIFF WBC: CPT

## 2023-05-17 PROCEDURE — 96375 TX/PRO/DX INJ NEW DRUG ADDON: CPT

## 2023-05-17 PROCEDURE — 80053 COMPREHEN METABOLIC PANEL: CPT

## 2023-05-17 PROCEDURE — 96413 CHEMO IV INFUSION 1 HR: CPT

## 2023-05-17 RX ORDER — DIPHENHYDRAMINE HCL 50 MG
25 CAPSULE ORAL ONCE
Refills: 0 | Status: COMPLETED | OUTPATIENT
Start: 2023-05-17 | End: 2023-05-17

## 2023-05-17 RX ORDER — FAMOTIDINE 10 MG/ML
20 INJECTION INTRAVENOUS ONCE
Refills: 0 | Status: COMPLETED | OUTPATIENT
Start: 2023-05-17 | End: 2023-05-17

## 2023-05-17 RX ORDER — DEXAMETHASONE 0.5 MG/5ML
4 ELIXIR ORAL ONCE
Refills: 0 | Status: COMPLETED | OUTPATIENT
Start: 2023-05-17 | End: 2023-05-17

## 2023-05-17 RX ORDER — ACETAMINOPHEN 500 MG
650 TABLET ORAL ONCE
Refills: 0 | Status: COMPLETED | OUTPATIENT
Start: 2023-05-17 | End: 2023-05-17

## 2023-05-17 RX ORDER — PACLITAXEL 6 MG/ML
170 INJECTION, SOLUTION, CONCENTRATE INTRAVENOUS ONCE
Refills: 0 | Status: COMPLETED | OUTPATIENT
Start: 2023-05-17 | End: 2023-05-17

## 2023-05-17 RX ADMIN — Medication 650 MILLIGRAM(S): at 09:40

## 2023-05-17 RX ADMIN — Medication 204 MILLIGRAM(S): at 09:25

## 2023-05-17 RX ADMIN — Medication 25 MILLIGRAM(S): at 09:55

## 2023-05-17 RX ADMIN — Medication 202 MILLIGRAM(S): at 09:40

## 2023-05-17 RX ADMIN — Medication 4 MILLIGRAM(S): at 09:40

## 2023-05-17 RX ADMIN — PACLITAXEL 170 MILLIGRAM(S): 6 INJECTION, SOLUTION, CONCENTRATE INTRAVENOUS at 10:15

## 2023-05-17 RX ADMIN — Medication 650 MILLIGRAM(S): at 09:10

## 2023-05-17 RX ADMIN — FAMOTIDINE 208 MILLIGRAM(S): 10 INJECTION INTRAVENOUS at 09:10

## 2023-05-17 RX ADMIN — FAMOTIDINE 20 MILLIGRAM(S): 10 INJECTION INTRAVENOUS at 09:25

## 2023-05-17 NOTE — PHYSICAL EXAM
[Fully active, able to carry on all pre-disease performance without restriction] : Status 0 - Fully active, able to carry on all pre-disease performance without restriction [Normal] : affect appropriate [de-identified] : about 3cm palpable hard mass in right breast ~10 clock position 10cm from nipple, no palpable axillary nodes -- about 1cm mass in same area of palpable concern on exam 4/19/23

## 2023-05-17 NOTE — ASSESSMENT
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 \par \par Patient states she first palpated the right breast mass sometime in February 2022 which prompted her to seek medical care. Patient's PCP Dr. Radha Sarah sent patient for imaging which yielded a 2.1cm lobulated heterogeneous mass on sonogram 10:00 10FN. She has no prior hx of breast surgery or bx. Denies family history of breast or ovarian cancer. Patient denies skin changes or nipple discharge bilaterally. \par \par Reviewed clinical utility of neoadjuvant chemoimmunotherapy as per the Keynote 522 clinical trial - recommending starting weekly carbo/taxol x12 followed by adriamycin/cytoxan every 2 weeks with continued pembrolizumab every 6 weeks throughout duration of chemotherapy IV through chemoport with growth factor support (Neulasta onbody injector) after AC. \par Reviewed potential side effects of chemotherapy including but not limited to allergic reactions/infusion reactions, bone marrow suppression leading to fatigue and increased risk of infection and febrile neutropenia,anemia and thrombocytopenia needing supportive transfusions,hair loss, skin/nail changes, nausea/vomiting, stomatitis, abdominal pain, diarrhea/constipation, kidney and liver dysfunction with electrolyte disturbances, peripheral neuropathy, small risk of cardiomyopathy and secondary malignancies (ie hematologic malignancies due to chemotherapy). Also discussed potential side effects of immunotherapy (pembro) including inflammation of colon leading to colitis, inflammation of lining of lung or heart for example or other organs leading to pleuritis and pericarditis respectively, also other possible conditions such as hypophysitis or pancreatitis; reviewed treatment in such a case would involve stopping of the offending agent and initiating steroids. \par \par Re-reviewed above risks of treatment, patient had all her questions answered and expressed verbal understanding and agreement to proceed with treatment. She signed a consent form. \par \par \par 5/17/23 To proceed with week 6 of treatment (off carboplatin due to neutropenia with week 4 of treatment) to proceed with Taxol; Pembro was given on 3/29, 5/10 and will continue every 6 wks.\par Compazine 10mg PO Q6hrs as needed for nausea/vomiting. \par Cr increased today to 1.3 from 1.0 likely due to pembro administration on 5/10. To continue to monitor, encouraged patient to increase oral hydration.\par Will continue to monitor peripheral neuropathy affecting her hands, for now at grade 1. \par \par RTC in one week for treatment (Taxol alone, needs CBC w/ diff and CMP prior to chemo, with TSH prior to pembro), next follow up in two weeks.

## 2023-05-17 NOTE — HISTORY OF PRESENT ILLNESS
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 Here today for f/u and tx.  \par \par Patient states she first palpated the right breast mass sometime in 2022 which prompted her to seek medical care. Patient's PCP Dr. Radha Sarah sent patient for imaging which yielded a 2.1cm lobulated heterogeneous mass on sonogram 10:00 10FN. She has no prior hx of breast surgery or bx. Denies family history of breast or ovarian cancer. Patient denies skin changes or nipple discharge bilaterally. \par \par 22: B/l MG (LHR)- heterogeneously dense. ROCKY. BI-RADS 2\par 23: R MG & B/l US (R palpable mass)- heterogenously dense. R 2.6 cm mass w/ heterogeneous calcs UOQ (palpable). US- R 2.1cm lobulated heterogeneous mass w/ calcs 10:00 10FN (palpable- rec US bx). L 0.6cm cyst 5:00 6FN. BI-RADS 4\par 23: R US bx 2.3cm mass 10:00 10FN (heart clip)- IDC (poorly differentiated), ER- (0%), HI- (0%), HER2- (0). Concordant- recommend definitive surgical and oncological management. \par \par OBhx:\par Menarche 15yo\par LMP s/p partial hysterectomy at age 45yo\par  did not breastfeed, age at first birth 18yo. \par Denies h/o fertility treatments, denies h/o HRT and OCP [de-identified] :  Patient states she feels well. Denies fever/chills, cough, sob, leg swelling. Numbness/tingling is present, mild in her fingers only. States was worse last week and affected her hand more, but now only in fingertips. Denies it affecting her function. Denies it affecting her feet. \par \par Pt started weekly carbo/taxol with pembro on Wed 3/29. Returns for week 6 of treatment today. \par

## 2023-05-23 ENCOUNTER — LABORATORY RESULT (OUTPATIENT)
Age: 58
End: 2023-05-23

## 2023-05-24 ENCOUNTER — APPOINTMENT (OUTPATIENT)
Dept: INFUSION THERAPY | Facility: CLINIC | Age: 58
End: 2023-05-24

## 2023-05-24 ENCOUNTER — OUTPATIENT (OUTPATIENT)
Dept: OUTPATIENT SERVICES | Facility: HOSPITAL | Age: 58
LOS: 1 days | End: 2023-05-24
Payer: MEDICAID

## 2023-05-24 VITALS
RESPIRATION RATE: 18 BRPM | DIASTOLIC BLOOD PRESSURE: 85 MMHG | OXYGEN SATURATION: 96 % | SYSTOLIC BLOOD PRESSURE: 133 MMHG | HEIGHT: 67 IN | HEART RATE: 98 BPM | WEIGHT: 225.97 LBS | TEMPERATURE: 99 F

## 2023-05-24 DIAGNOSIS — C50.919 MALIGNANT NEOPLASM OF UNSPECIFIED SITE OF UNSPECIFIED FEMALE BREAST: ICD-10-CM

## 2023-05-24 DIAGNOSIS — Z98.890 OTHER SPECIFIED POSTPROCEDURAL STATES: Chronic | ICD-10-CM

## 2023-05-24 DIAGNOSIS — Z90.710 ACQUIRED ABSENCE OF BOTH CERVIX AND UTERUS: Chronic | ICD-10-CM

## 2023-05-24 DIAGNOSIS — Z98.891 HISTORY OF UTERINE SCAR FROM PREVIOUS SURGERY: Chronic | ICD-10-CM

## 2023-05-24 LAB — T4 FREE SERPL-MCNC: 2.79 NG/DL — HIGH

## 2023-05-24 PROCEDURE — 96413 CHEMO IV INFUSION 1 HR: CPT

## 2023-05-24 PROCEDURE — 96375 TX/PRO/DX INJ NEW DRUG ADDON: CPT

## 2023-05-24 RX ADMIN — Medication 650 MILLIGRAM(S): at 14:00

## 2023-05-24 RX ADMIN — PACLITAXEL 170 MILLIGRAM(S): 6 INJECTION, SOLUTION, CONCENTRATE INTRAVENOUS at 15:55

## 2023-05-24 RX ADMIN — Medication 4 MILLIGRAM(S): at 14:45

## 2023-05-24 RX ADMIN — Medication 25 MILLIGRAM(S): at 14:30

## 2023-05-24 RX ADMIN — PACLITAXEL 170 MILLIGRAM(S): 6 INJECTION, SOLUTION, CONCENTRATE INTRAVENOUS at 14:55

## 2023-05-24 RX ADMIN — FAMOTIDINE 20 MILLIGRAM(S): 10 INJECTION INTRAVENOUS at 14:15

## 2023-05-24 RX ADMIN — Medication 202 MILLIGRAM(S): at 14:15

## 2023-05-24 RX ADMIN — Medication 650 MILLIGRAM(S): at 14:30

## 2023-05-24 RX ADMIN — FAMOTIDINE 208 MILLIGRAM(S): 10 INJECTION INTRAVENOUS at 14:00

## 2023-05-24 RX ADMIN — Medication 204 MILLIGRAM(S): at 14:30

## 2023-05-31 ENCOUNTER — APPOINTMENT (OUTPATIENT)
Dept: INFUSION THERAPY | Facility: CLINIC | Age: 58
End: 2023-05-31

## 2023-05-31 ENCOUNTER — LABORATORY RESULT (OUTPATIENT)
Age: 58
End: 2023-05-31

## 2023-05-31 ENCOUNTER — OUTPATIENT (OUTPATIENT)
Dept: OUTPATIENT SERVICES | Facility: HOSPITAL | Age: 58
LOS: 1 days | End: 2023-05-31
Payer: MEDICAID

## 2023-05-31 ENCOUNTER — APPOINTMENT (OUTPATIENT)
Dept: HEMATOLOGY ONCOLOGY | Facility: CLINIC | Age: 58
End: 2023-05-31
Payer: MEDICAID

## 2023-05-31 ENCOUNTER — NON-APPOINTMENT (OUTPATIENT)
Age: 58
End: 2023-05-31

## 2023-05-31 VITALS
SYSTOLIC BLOOD PRESSURE: 161 MMHG | RESPIRATION RATE: 18 BRPM | WEIGHT: 227.96 LBS | OXYGEN SATURATION: 96 % | TEMPERATURE: 97 F | HEIGHT: 67 IN | HEART RATE: 97 BPM | DIASTOLIC BLOOD PRESSURE: 91 MMHG

## 2023-05-31 VITALS
BODY MASS INDEX: 35.79 KG/M2 | OXYGEN SATURATION: 96 % | HEART RATE: 97 BPM | WEIGHT: 228 LBS | TEMPERATURE: 97 F | DIASTOLIC BLOOD PRESSURE: 91 MMHG | SYSTOLIC BLOOD PRESSURE: 161 MMHG | RESPIRATION RATE: 18 BRPM | HEIGHT: 67 IN

## 2023-05-31 DIAGNOSIS — Z90.710 ACQUIRED ABSENCE OF BOTH CERVIX AND UTERUS: Chronic | ICD-10-CM

## 2023-05-31 DIAGNOSIS — Z98.890 OTHER SPECIFIED POSTPROCEDURAL STATES: Chronic | ICD-10-CM

## 2023-05-31 DIAGNOSIS — D44.0 NEOPLASM OF UNCERTAIN BEHAVIOR OF THYROID GLAND: ICD-10-CM

## 2023-05-31 DIAGNOSIS — Z98.891 HISTORY OF UTERINE SCAR FROM PREVIOUS SURGERY: Chronic | ICD-10-CM

## 2023-05-31 DIAGNOSIS — C50.919 MALIGNANT NEOPLASM OF UNSPECIFIED SITE OF UNSPECIFIED FEMALE BREAST: ICD-10-CM

## 2023-05-31 LAB
ALBUMIN SERPL ELPH-MCNC: 3.6 G/DL
ALP BLD-CCNC: 63 U/L
ALT SERPL-CCNC: 27 U/L
ANION GAP SERPL CALC-SCNC: 15 MMOL/L
AST SERPL-CCNC: 29 U/L
BILIRUB SERPL-MCNC: 0.5 MG/DL
BUN SERPL-MCNC: 13 MG/DL
CALCIUM SERPL-MCNC: 10 MG/DL
CHLORIDE SERPL-SCNC: 104 MMOL/L
CO2 SERPL-SCNC: 26 MMOL/L
CREAT SERPL-MCNC: 1 MG/DL
EGFR: 66 ML/MIN/1.73M2
GLUCOSE SERPL-MCNC: 169 MG/DL
POTASSIUM SERPL-SCNC: 4.1 MMOL/L
PROT SERPL-MCNC: 8 G/DL
SODIUM SERPL-SCNC: 145 MMOL/L

## 2023-05-31 PROCEDURE — 96375 TX/PRO/DX INJ NEW DRUG ADDON: CPT

## 2023-05-31 PROCEDURE — 96413 CHEMO IV INFUSION 1 HR: CPT

## 2023-05-31 PROCEDURE — 99214 OFFICE O/P EST MOD 30 MIN: CPT | Mod: 25

## 2023-05-31 PROCEDURE — 36415 COLL VENOUS BLD VENIPUNCTURE: CPT

## 2023-05-31 RX ORDER — FAMOTIDINE 10 MG/ML
20 INJECTION INTRAVENOUS ONCE
Refills: 0 | Status: COMPLETED | OUTPATIENT
Start: 2023-05-31 | End: 2023-05-31

## 2023-05-31 RX ORDER — PACLITAXEL 6 MG/ML
170 INJECTION, SOLUTION, CONCENTRATE INTRAVENOUS ONCE
Refills: 0 | Status: COMPLETED | OUTPATIENT
Start: 2023-05-31 | End: 2023-05-31

## 2023-05-31 RX ORDER — ACETAMINOPHEN 500 MG
650 TABLET ORAL ONCE
Refills: 0 | Status: COMPLETED | OUTPATIENT
Start: 2023-05-31 | End: 2023-05-31

## 2023-05-31 RX ORDER — DEXAMETHASONE 0.5 MG/5ML
4 ELIXIR ORAL ONCE
Refills: 0 | Status: COMPLETED | OUTPATIENT
Start: 2023-05-31 | End: 2023-05-31

## 2023-05-31 RX ADMIN — PACLITAXEL 170 MILLIGRAM(S): 6 INJECTION, SOLUTION, CONCENTRATE INTRAVENOUS at 15:43

## 2023-05-31 RX ADMIN — Medication 204 MILLIGRAM(S): at 14:12

## 2023-05-31 RX ADMIN — PACLITAXEL 170 MILLIGRAM(S): 6 INJECTION, SOLUTION, CONCENTRATE INTRAVENOUS at 14:43

## 2023-05-31 RX ADMIN — Medication 650 MILLIGRAM(S): at 13:28

## 2023-05-31 RX ADMIN — Medication 650 MILLIGRAM(S): at 14:13

## 2023-05-31 RX ADMIN — Medication 4 MILLIGRAM(S): at 14:27

## 2023-05-31 RX ADMIN — Medication 202 MILLIGRAM(S): at 14:20

## 2023-05-31 RX ADMIN — Medication 25 MILLIGRAM(S): at 14:35

## 2023-05-31 RX ADMIN — FAMOTIDINE 208 MILLIGRAM(S): 10 INJECTION INTRAVENOUS at 13:58

## 2023-05-31 RX ADMIN — FAMOTIDINE 20 MILLIGRAM(S): 10 INJECTION INTRAVENOUS at 14:13

## 2023-05-31 NOTE — ASSESSMENT
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 \par \par Patient states she first palpated the right breast mass sometime in February 2022 which prompted her to seek medical care. Patient's PCP Dr. Radha Sarah sent patient for imaging which yielded a 2.1cm lobulated heterogeneous mass on sonogram 10:00 10FN. She has no prior hx of breast surgery or bx. Denies family history of breast or ovarian cancer. Patient denies skin changes or nipple discharge bilaterally. \par \par Reviewed clinical utility of neoadjuvant chemoimmunotherapy as per the Keynote 522 clinical trial - recommending starting weekly carbo/taxol x12 followed by adriamycin/cytoxan every 2 weeks with continued pembrolizumab every 6 weeks throughout duration of chemotherapy IV through chemoport with growth factor support (Neulasta onbody injector) after AC. \par Reviewed potential side effects of chemotherapy including but not limited to allergic reactions/infusion reactions, bone marrow suppression leading to fatigue and increased risk of infection and febrile neutropenia,anemia and thrombocytopenia needing supportive transfusions,hair loss, skin/nail changes, nausea/vomiting, stomatitis, abdominal pain, diarrhea/constipation, kidney and liver dysfunction with electrolyte disturbances, peripheral neuropathy, small risk of cardiomyopathy and secondary malignancies (ie hematologic malignancies due to chemotherapy). Also discussed potential side effects of immunotherapy (pembro) including inflammation of colon leading to colitis, inflammation of lining of lung or heart for example or other organs leading to pleuritis and pericarditis respectively, also other possible conditions such as hypophysitis or pancreatitis; reviewed treatment in such a case would involve stopping of the offending agent and initiating steroids. \par \par Re-reviewed above risks of treatment, patient had all her questions answered and expressed verbal understanding and agreement to proceed with treatment. She signed a consent form. \par \par \par 5/31//23 To proceed with week 10*(correction) of treatment (off carboplatin due to neutropenia with week 4 of treatment) to proceed with Taxol; Pembro was given on 3/29, 5/10 and will continue every 6 wks.\par Compazine 10mg PO Q6hrs as needed for nausea/vomiting. \par Cr increased today to 1.3 from 1.0 likely due to pembro administration on 5/10. To continue to monitor, encouraged patient to increase oral hydration.\par Will continue to monitor peripheral neuropathy affecting her hands, for now at grade 1. \par \par RTC in one week for treatment (Taxol alone, needs CBC w/ diff and CMP prior to chemo, with TSH prior to pembro), next follow up in 1 week as well to monitor neuropathy.

## 2023-05-31 NOTE — HISTORY OF PRESENT ILLNESS
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 Here today for f/u and C8 Taxol\par \par Patient states she first palpated the right breast mass sometime in 2022 which prompted her to seek medical care. Patient's PCP Dr. Radha aSrah sent patient for imaging which yielded a 2.1cm lobulated heterogeneous mass on sonogram 10:00 10FN. She has no prior hx of breast surgery or bx. Denies family history of breast or ovarian cancer. Patient denies skin changes or nipple discharge bilaterally. \par \par 22: B/l MG (LHR)- heterogeneously dense. ROCKY. BI-RADS 2\par 23: R MG & B/l US (R palpable mass)- heterogenously dense. R 2.6 cm mass w/ heterogeneous calcs UOQ (palpable). US- R 2.1cm lobulated heterogeneous mass w/ calcs 10:00 10FN (palpable- rec US bx). L 0.6cm cyst 5:00 6FN. BI-RADS 4\par 23: R US bx 2.3cm mass 10:00 10FN (heart clip)- IDC (poorly differentiated), ER- (0%), SD- (0%), HER2- (0). Concordant- recommend definitive surgical and oncological management. \par \par OBhx:\par Menarche 15yo\par LMP s/p partial hysterectomy at age 47yo\par  did not breastfeed, age at first birth 18yo. \par Denies h/o fertility treatments, denies h/o HRT and OCP [de-identified] :  Patient states she feels well. Denies fever/chills, cough, sob, leg swelling. Numbness/tingling is present, mild in her fingers only, states 5/10 in severity w/o affecting function, also in her feet but again not affecting function, not impairing balance. \par \par Pt started weekly carbo/taxol with pembro on Wed 3/29. Returns for week 10 of treatment today - Taxol alone today. \par \par

## 2023-05-31 NOTE — PHYSICAL EXAM
[Fully active, able to carry on all pre-disease performance without restriction] : Status 0 - Fully active, able to carry on all pre-disease performance without restriction [Normal] : affect appropriate [de-identified] : about 3cm palpable hard mass in right breast ~10 clock position 10cm from nipple, no palpable axillary nodes -- about 1cm mass in same area of palpable concern on exam 4/19/23; 5/31/23 no longer palpable , no lesions in right breast on exam

## 2023-06-07 ENCOUNTER — OUTPATIENT (OUTPATIENT)
Dept: OUTPATIENT SERVICES | Facility: HOSPITAL | Age: 58
LOS: 1 days | End: 2023-06-07
Payer: MEDICAID

## 2023-06-07 ENCOUNTER — APPOINTMENT (OUTPATIENT)
Dept: HEMATOLOGY ONCOLOGY | Facility: CLINIC | Age: 58
End: 2023-06-07
Payer: MEDICAID

## 2023-06-07 ENCOUNTER — APPOINTMENT (OUTPATIENT)
Dept: INFUSION THERAPY | Facility: CLINIC | Age: 58
End: 2023-06-07

## 2023-06-07 VITALS
RESPIRATION RATE: 17 BRPM | HEART RATE: 82 BPM | OXYGEN SATURATION: 98 % | DIASTOLIC BLOOD PRESSURE: 80 MMHG | SYSTOLIC BLOOD PRESSURE: 140 MMHG | TEMPERATURE: 98 F

## 2023-06-07 VITALS
DIASTOLIC BLOOD PRESSURE: 83 MMHG | WEIGHT: 225.97 LBS | HEIGHT: 67 IN | SYSTOLIC BLOOD PRESSURE: 135 MMHG | RESPIRATION RATE: 16 BRPM | TEMPERATURE: 98 F | HEART RATE: 84 BPM | OXYGEN SATURATION: 97 %

## 2023-06-07 DIAGNOSIS — Z98.891 HISTORY OF UTERINE SCAR FROM PREVIOUS SURGERY: Chronic | ICD-10-CM

## 2023-06-07 DIAGNOSIS — R13.10 DYSPHAGIA, UNSPECIFIED: ICD-10-CM

## 2023-06-07 DIAGNOSIS — C50.919 MALIGNANT NEOPLASM OF UNSPECIFIED SITE OF UNSPECIFIED FEMALE BREAST: ICD-10-CM

## 2023-06-07 DIAGNOSIS — Z90.710 ACQUIRED ABSENCE OF BOTH CERVIX AND UTERUS: Chronic | ICD-10-CM

## 2023-06-07 DIAGNOSIS — Z98.890 OTHER SPECIFIED POSTPROCEDURAL STATES: Chronic | ICD-10-CM

## 2023-06-07 LAB
ALBUMIN SERPL ELPH-MCNC: 3.4 G/DL — SIGNIFICANT CHANGE UP (ref 3.3–5)
ALP SERPL-CCNC: 64 U/L — SIGNIFICANT CHANGE UP (ref 40–120)
ALT FLD-CCNC: 30 U/L — SIGNIFICANT CHANGE UP (ref 10–45)
ANION GAP SERPL CALC-SCNC: 10 MMOL/L — SIGNIFICANT CHANGE UP (ref 5–17)
AST SERPL-CCNC: 29 U/L — SIGNIFICANT CHANGE UP (ref 10–40)
BILIRUB SERPL-MCNC: 0.5 MG/DL — SIGNIFICANT CHANGE UP (ref 0.2–1.2)
BUN SERPL-MCNC: 18 MG/DL — SIGNIFICANT CHANGE UP (ref 7–23)
CALCIUM SERPL-MCNC: 9.5 MG/DL — SIGNIFICANT CHANGE UP (ref 8.4–10.5)
CHLORIDE SERPL-SCNC: 106 MMOL/L — SIGNIFICANT CHANGE UP (ref 96–108)
CO2 SERPL-SCNC: 27 MMOL/L — SIGNIFICANT CHANGE UP (ref 22–31)
CREAT SERPL-MCNC: 1 MG/DL — SIGNIFICANT CHANGE UP (ref 0.5–1.3)
EGFR: 66 ML/MIN/1.73M2 — SIGNIFICANT CHANGE UP
GLUCOSE SERPL-MCNC: 187 MG/DL — HIGH (ref 70–99)
HCT VFR BLD CALC: 30.5 % — LOW (ref 34.5–45)
HGB BLD-MCNC: 9.6 G/DL — LOW (ref 11.5–15.5)
LYMPHOCYTES # BLD AUTO: 2 K/UL — SIGNIFICANT CHANGE UP (ref 1–3.3)
LYMPHOCYTES # BLD AUTO: 33.2 % — SIGNIFICANT CHANGE UP (ref 13–44)
MCHC RBC-ENTMCNC: 28.2 PG — SIGNIFICANT CHANGE UP (ref 27–34)
MCHC RBC-ENTMCNC: 31.5 GM/DL — LOW (ref 32–36)
MCV RBC AUTO: 89.7 FL — SIGNIFICANT CHANGE UP (ref 80–100)
NEUTROPHILS # BLD AUTO: 3.8 K/UL — SIGNIFICANT CHANGE UP (ref 1.8–7.4)
NEUTROPHILS NFR BLD AUTO: 62.8 % — SIGNIFICANT CHANGE UP (ref 43–77)
PLATELET # BLD AUTO: 458 K/UL — HIGH (ref 150–400)
POTASSIUM SERPL-MCNC: 4.1 MMOL/L — SIGNIFICANT CHANGE UP (ref 3.5–5.3)
POTASSIUM SERPL-SCNC: 4.1 MMOL/L — SIGNIFICANT CHANGE UP (ref 3.5–5.3)
PROT SERPL-MCNC: 7.9 G/DL — SIGNIFICANT CHANGE UP (ref 6–8.3)
RBC # BLD: 3.4 M/UL — LOW (ref 3.8–5.2)
RBC # FLD: 16.6 % — HIGH (ref 10.3–14.5)
SODIUM SERPL-SCNC: 143 MMOL/L — SIGNIFICANT CHANGE UP (ref 135–145)
T4 FREE SERPL-MCNC: 1.68 NG/DL — SIGNIFICANT CHANGE UP (ref 0.93–1.7)
TSH SERPL-MCNC: 23.6 UIU/ML — HIGH (ref 0.27–4.2)
WBC # BLD: 6 K/UL — SIGNIFICANT CHANGE UP (ref 3.8–10.5)
WBC # FLD AUTO: 6 K/UL — SIGNIFICANT CHANGE UP (ref 3.8–10.5)

## 2023-06-07 PROCEDURE — 84439 ASSAY OF FREE THYROXINE: CPT

## 2023-06-07 PROCEDURE — 80053 COMPREHEN METABOLIC PANEL: CPT

## 2023-06-07 PROCEDURE — 36415 COLL VENOUS BLD VENIPUNCTURE: CPT

## 2023-06-07 PROCEDURE — 99214 OFFICE O/P EST MOD 30 MIN: CPT

## 2023-06-07 PROCEDURE — 96413 CHEMO IV INFUSION 1 HR: CPT

## 2023-06-07 PROCEDURE — 85025 COMPLETE CBC W/AUTO DIFF WBC: CPT

## 2023-06-07 PROCEDURE — 96375 TX/PRO/DX INJ NEW DRUG ADDON: CPT

## 2023-06-07 PROCEDURE — 84443 ASSAY THYROID STIM HORMONE: CPT

## 2023-06-07 RX ORDER — PACLITAXEL 6 MG/ML
170 INJECTION, SOLUTION, CONCENTRATE INTRAVENOUS ONCE
Refills: 0 | Status: COMPLETED | OUTPATIENT
Start: 2023-06-07 | End: 2023-06-07

## 2023-06-07 RX ORDER — FAMOTIDINE 10 MG/ML
20 INJECTION INTRAVENOUS ONCE
Refills: 0 | Status: COMPLETED | OUTPATIENT
Start: 2023-06-07 | End: 2023-06-07

## 2023-06-07 RX ORDER — DEXAMETHASONE 0.5 MG/5ML
4 ELIXIR ORAL ONCE
Refills: 0 | Status: COMPLETED | OUTPATIENT
Start: 2023-06-07 | End: 2023-06-07

## 2023-06-07 RX ORDER — ACETAMINOPHEN 500 MG
650 TABLET ORAL ONCE
Refills: 0 | Status: COMPLETED | OUTPATIENT
Start: 2023-06-07 | End: 2023-06-07

## 2023-06-07 RX ORDER — DIPHENHYDRAMINE HCL 50 MG
25 CAPSULE ORAL ONCE
Refills: 0 | Status: COMPLETED | OUTPATIENT
Start: 2023-06-07 | End: 2023-06-07

## 2023-06-07 RX ORDER — CLOBETASOL PROPIONATE 0.5 MG/G
0.05 CREAM TOPICAL 3 TIMES DAILY
Qty: 1 | Refills: 3 | Status: ACTIVE | COMMUNITY
Start: 2023-06-07 | End: 1900-01-01

## 2023-06-07 RX ADMIN — Medication 650 MILLIGRAM(S): at 09:23

## 2023-06-07 RX ADMIN — PACLITAXEL 170 MILLIGRAM(S): 6 INJECTION, SOLUTION, CONCENTRATE INTRAVENOUS at 10:10

## 2023-06-07 RX ADMIN — Medication 4 MILLIGRAM(S): at 09:35

## 2023-06-07 RX ADMIN — Medication 25 MILLIGRAM(S): at 10:06

## 2023-06-07 RX ADMIN — Medication 204 MILLIGRAM(S): at 09:20

## 2023-06-07 RX ADMIN — PACLITAXEL 170 MILLIGRAM(S): 6 INJECTION, SOLUTION, CONCENTRATE INTRAVENOUS at 11:10

## 2023-06-07 RX ADMIN — Medication 202 MILLIGRAM(S): at 09:52

## 2023-06-07 RX ADMIN — FAMOTIDINE 20 MILLIGRAM(S): 10 INJECTION INTRAVENOUS at 09:51

## 2023-06-07 RX ADMIN — Medication 650 MILLIGRAM(S): at 09:19

## 2023-06-07 RX ADMIN — FAMOTIDINE 208 MILLIGRAM(S): 10 INJECTION INTRAVENOUS at 09:36

## 2023-06-07 NOTE — PHYSICAL EXAM
[Normal] : affect appropriate [Restricted in physically strenuous activity but ambulatory and able to carry out work of a light or sedentary nature] : Status 1- Restricted in physically strenuous activity but ambulatory and able to carry out work of a light or sedentary nature, e.g., light house work, office work [de-identified] : left neck swelling [de-identified] : about 3cm palpable hard mass in right breast ~10 clock position 10cm from nipple, no palpable axillary nodes -- about 1cm mass in same area of palpable concern on exam 4/19/23; 5/31/23 no longer palpable , no lesions in right breast on exam [de-identified] : right arm rash, hives

## 2023-06-07 NOTE — ASSESSMENT
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 \par \par Patient states she first palpated the right breast mass sometime in February 2022 which prompted her to seek medical care. Patient's PCP Dr. Radha Sarah sent patient for imaging which yielded a 2.1cm lobulated heterogeneous mass on sonogram 10:00 10FN. She has no prior hx of breast surgery or bx. Denies family history of breast or ovarian cancer. Patient denies skin changes or nipple discharge bilaterally. \par \par Reviewed clinical utility of neoadjuvant chemoimmunotherapy as per the Keynote 522 clinical trial - recommending starting weekly carbo/taxol x12 followed by adriamycin/cytoxan every 2 weeks with continued pembrolizumab every 6 weeks throughout duration of chemotherapy IV through chemoport with growth factor support (Neulasta onbody injector) after AC. \par Reviewed potential side effects of chemotherapy including but not limited to allergic reactions/infusion reactions, bone marrow suppression leading to fatigue and increased risk of infection and febrile neutropenia,anemia and thrombocytopenia needing supportive transfusions,hair loss, skin/nail changes, nausea/vomiting, stomatitis, abdominal pain, diarrhea/constipation, kidney and liver dysfunction with electrolyte disturbances, peripheral neuropathy, small risk of cardiomyopathy and secondary malignancies (ie hematologic malignancies due to chemotherapy). Also discussed potential side effects of immunotherapy (pembro) including inflammation of colon leading to colitis, inflammation of lining of lung or heart for example or other organs leading to pleuritis and pericarditis respectively, also other possible conditions such as hypophysitis or pancreatitis; reviewed treatment in such a case would involve stopping of the offending agent and initiating steroids. \par \par Re-reviewed above risks of treatment, patient had all her questions answered and expressed verbal understanding and agreement to proceed with treatment. She signed a consent form. \par \par \par 5/31/23 To proceed with week 10*(correction) of treatment (off carboplatin due to neutropenia with week 4 of treatment) to proceed with Taxol; Pembro was given on 3/29, 5/10 and will continue every 6 wks.\par Compazine 10mg PO Q6hrs as needed for nausea/vomiting. \par Cr increased today to 1.3 from 1.0 likely due to pembro administration on 5/10. To continue to monitor, encouraged patient to increase oral hydration.\par Will continue to monitor peripheral neuropathy affecting her hands, for now at grade 1. \par \par 06/07/23\par ANC at goal, grade 1 neuropathy present. \par Proceed with week 11 of Taxol. \par Reports left throat pain when swallowing. Possible mucositis. Will prescribe MMW. If no improvement, will refer to ENT. \par Will continue to monitor right arm pruritic rash, may be grade 1 IrAE. Recommend topical steroid. \par \par RTC in one week for treatment (Taxol alone, needs CBC w/ diff and CMP prior to chemo, with TSH prior to pembro), next follow up in 1 week as well to monitor neuropathy.

## 2023-06-07 NOTE — HISTORY OF PRESENT ILLNESS
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 Here today for f/u and C9 Taxol\par \par Patient states she first palpated the right breast mass sometime in 2022 which prompted her to seek medical care. Patient's PCP Dr. Radha Sarah sent patient for imaging which yielded a 2.1cm lobulated heterogeneous mass on sonogram 10:00 10FN. She has no prior hx of breast surgery or bx. Denies family history of breast or ovarian cancer. Patient denies skin changes or nipple discharge bilaterally. \par \par 22: B/l MG (LHR)- heterogeneously dense. ROCKY. BI-RADS 2\par 23: R MG & B/l US (R palpable mass)- heterogenously dense. R 2.6 cm mass w/ heterogeneous calcs UOQ (palpable). US- R 2.1cm lobulated heterogeneous mass w/ calcs 10:00 10FN (palpable- rec US bx). L 0.6cm cyst 5:00 6FN. BI-RADS 4\par 23: R US bx 2.3cm mass 10:00 10FN (heart clip)- IDC (poorly differentiated), ER- (0%), CO- (0%), HER2- (0). Concordant- recommend definitive surgical and oncological management. \par \par OBhx:\par Menarche 15yo\par LMP s/p partial hysterectomy at age 47yo\par  did not breastfeed, age at first birth 18yo. \par Denies h/o fertility treatments, denies h/o HRT and OCP [de-identified] :  Patient states she feels well. Denies fever/chills, cough, sob, leg swelling. Numbness/tingling is present, mild in her fingers only, states 5/10 in severity w/o affecting function, also in her feet but again not affecting function, not impairing balance. \par \par Also reports right arm rash approximately 5 weeks ago, has not changed in distribution since, +pruritus. \par Reports left throat pain with difficulty swallowing solids starting Saturday, but states is able to tolerate PO and drinks both liquids and has been eating normally.\par \par Pt started weekly carbo/taxol with pembro on Wed 3/29. Returns for week 11 of treatment today - Taxol alone today. \par \par

## 2023-06-07 NOTE — REVIEW OF SYSTEMS
[Negative] : Allergic/Immunologic [Dysphagia] : dysphagia [Odynophagia] : odynophagia [Skin Rash] : skin rash [Skin Wound] : no skin wound

## 2023-06-14 ENCOUNTER — LABORATORY RESULT (OUTPATIENT)
Age: 58
End: 2023-06-14

## 2023-06-14 ENCOUNTER — APPOINTMENT (OUTPATIENT)
Dept: HEMATOLOGY ONCOLOGY | Facility: CLINIC | Age: 58
End: 2023-06-14
Payer: MEDICAID

## 2023-06-14 ENCOUNTER — NON-APPOINTMENT (OUTPATIENT)
Age: 58
End: 2023-06-14

## 2023-06-14 VITALS — HEART RATE: 111 BPM

## 2023-06-14 VITALS
HEIGHT: 67 IN | HEART RATE: 121 BPM | BODY MASS INDEX: 35.79 KG/M2 | OXYGEN SATURATION: 95 % | WEIGHT: 228 LBS | RESPIRATION RATE: 18 BRPM | DIASTOLIC BLOOD PRESSURE: 72 MMHG | SYSTOLIC BLOOD PRESSURE: 160 MMHG | TEMPERATURE: 98.7 F

## 2023-06-14 LAB
ALBUMIN SERPL ELPH-MCNC: 3.2 G/DL
ALP BLD-CCNC: 67 U/L
ALT SERPL-CCNC: 29 U/L
ANION GAP SERPL CALC-SCNC: 10 MMOL/L
AST SERPL-CCNC: 25 U/L
BILIRUB SERPL-MCNC: 0.6 MG/DL
BUN SERPL-MCNC: 11 MG/DL
CALCIUM SERPL-MCNC: 9.7 MG/DL
CHLORIDE SERPL-SCNC: 103 MMOL/L
CO2 SERPL-SCNC: 29 MMOL/L
CREAT SERPL-MCNC: 0.8 MG/DL
EGFR: 86 ML/MIN/1.73M2
GLUCOSE SERPL-MCNC: 200 MG/DL
POTASSIUM SERPL-SCNC: 3.6 MMOL/L
PROT SERPL-MCNC: 7.4 G/DL
SODIUM SERPL-SCNC: 142 MMOL/L

## 2023-06-14 PROCEDURE — 99214 OFFICE O/P EST MOD 30 MIN: CPT | Mod: 25

## 2023-06-14 PROCEDURE — 36415 COLL VENOUS BLD VENIPUNCTURE: CPT

## 2023-06-14 NOTE — REVIEW OF SYSTEMS
[Dysphagia] : dysphagia [Odynophagia] : odynophagia [Skin Rash] : skin rash [Negative] : Allergic/Immunologic [Skin Wound] : no skin wound

## 2023-06-14 NOTE — PHYSICAL EXAM
[Restricted in physically strenuous activity but ambulatory and able to carry out work of a light or sedentary nature] : Status 1- Restricted in physically strenuous activity but ambulatory and able to carry out work of a light or sedentary nature, e.g., light house work, office work [Normal] : affect appropriate [de-identified] : left neck swelling [de-identified] : about 3cm palpable hard mass in right breast ~10 clock position 10cm from nipple, no palpable axillary nodes -- about 1cm mass in same area of palpable concern on exam 4/19/23; 5/31/23 no longer palpable , no lesions in right breast on exam, 6/14/23 - no palpable breast mass. [de-identified] : right arm rash, hives

## 2023-06-14 NOTE — HISTORY OF PRESENT ILLNESS
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 \par \par Here today for f/u and C12 Taxol\par \par Patient states she first palpated the right breast mass sometime in 2022 which prompted her to seek medical care. Patient's PCP Dr. Radha Sarah sent patient for imaging which yielded a 2.1cm lobulated heterogeneous mass on sonogram 10:00 10FN. She has no prior hx of breast surgery or bx. Denies family history of breast or ovarian cancer. Patient denies skin changes or nipple discharge bilaterally. \par \par 22: B/l MG (LHR)- heterogeneously dense. ROCKY. BI-RADS 2\par 23: R MG & B/l US (R palpable mass)- heterogenously dense. R 2.6 cm mass w/ heterogeneous calcs UOQ (palpable). US- R 2.1cm lobulated heterogeneous mass w/ calcs 10:00 10FN (palpable- rec US bx). L 0.6cm cyst 5:00 6FN. BI-RADS 4\par 23: R US bx 2.3cm mass 10:00 10FN (heart clip)- IDC (poorly differentiated), ER- (0%), AK- (0%), HER2- (0). Concordant- recommend definitive surgical and oncological management. \par \par OBhx:\par Menarche 15yo\par LMP s/p partial hysterectomy at age 47yo\par  did not breastfeed, age at first birth 20yo. \par Denies h/o fertility treatments, denies h/o HRT and OCP [de-identified] : Denies fever/chills, cough, sob, leg swelling. Numbness/tingling is present, mild in her fingers only, states 5/10 in severity w/o affecting function, also in her feet and states feels like it's worse in her feet and she has been walking differently.\par \par Right arm rash about the same, with some improvement.\par Endorses some throat pain, states is able to tolerate PO and drinks both liquids and has been eating normally. Found magic mouth wash helpful.\par \par Pt started weekly carbo/taxol with pembro on Wed 3/29. Returns for week 12 of treatment today with Taxol alone, Pembro due on 6/21/23. \par \par Patient crying today, stating that she felt very tired, with headache this morning and had to also rest multiple times while walking to clinic today. Denies chest pain, states feels exhausted.\par \par

## 2023-06-14 NOTE — ASSESSMENT
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 \par \par Patient states she first palpated the right breast mass sometime in February 2022 which prompted her to seek medical care. Patient's PCP Dr. Radha Sarah sent patient for imaging which yielded a 2.1cm lobulated heterogeneous mass on sonogram 10:00 10FN. She has no prior hx of breast surgery or bx. Denies family history of breast or ovarian cancer. Patient denies skin changes or nipple discharge bilaterally. \par \par Reviewed clinical utility of neoadjuvant chemoimmunotherapy as per the Keynote 522 clinical trial - recommending starting weekly carbo/taxol x12 followed by adriamycin/cytoxan every 2 weeks with continued pembrolizumab every 6 weeks throughout duration of chemotherapy IV through chemoport with growth factor support (Neulasta onbody injector) after AC. \par Reviewed potential side effects of chemotherapy including but not limited to allergic reactions/infusion reactions, bone marrow suppression leading to fatigue and increased risk of infection and febrile neutropenia,anemia and thrombocytopenia needing supportive transfusions,hair loss, skin/nail changes, nausea/vomiting, stomatitis, abdominal pain, diarrhea/constipation, kidney and liver dysfunction with electrolyte disturbances, peripheral neuropathy, small risk of cardiomyopathy and secondary malignancies (ie hematologic malignancies due to chemotherapy). Also discussed potential side effects of immunotherapy (pembro) including inflammation of colon leading to colitis, inflammation of lining of lung or heart for example or other organs leading to pleuritis and pericarditis respectively, also other possible conditions such as hypophysitis or pancreatitis; reviewed treatment in such a case would involve stopping of the offending agent and initiating steroids. \par \par Re-reviewed above risks of treatment, patient had all her questions answered and expressed verbal understanding and agreement to proceed with treatment. She signed a consent form. \par \par \par 5/31/23 To proceed with week 10*(correction) of treatment (off carboplatin due to neutropenia with week 4 of treatment) to proceed with Taxol; Pembro was given on 3/29, 5/10 and will continue every 6 wks.\par Compazine 10mg PO Q6hrs as needed for nausea/vomiting. \par Cr increased today to 1.3 from 1.0 likely due to pembro administration on 5/10. To continue to monitor, encouraged patient to increase oral hydration.\par Will continue to monitor peripheral neuropathy affecting her hands, for now at grade 1. \par \par 06/07/23\par ANC at goal, grade 1 neuropathy present. \par Proceed with week 11 of Taxol. \par Reports left throat pain when swallowing. Possible mucositis. Will prescribe MMW. If no improvement, will refer to ENT. \par Will continue to monitor right arm pruritic rash, may be grade 1 IrAE. Recommend topical steroid. \par \par 6/14/23 Pt tearful today, states exhausted, neuropathy still grade 1 in hands, and 1to 2 in feet.\par Decided to hold Taxol today #12/12 today. \par To start AC #1 with growth factor, and Pembro on 6/21/23. \par On exam good response, no palpable breast mass. \par MMW prn for throat pain which patient found helpful. \par Dr Villa referral for anxiety.\par \par \par RTC in one week for treatment (to start AC#1 with onbody Neulasta Onpro and Pembro due as well 6/21), next follow up in 1 week as well. Needs LABS (CBC w/ diff, CMP, TSH).

## 2023-06-15 ENCOUNTER — NON-APPOINTMENT (OUTPATIENT)
Age: 58
End: 2023-06-15

## 2023-06-15 LAB — TSH SERPL-ACNC: 10.4 UIU/ML

## 2023-06-16 ENCOUNTER — APPOINTMENT (OUTPATIENT)
Dept: ENDOCRINOLOGY | Facility: CLINIC | Age: 58
End: 2023-06-16
Payer: MEDICAID

## 2023-06-16 ENCOUNTER — RESULT CHARGE (OUTPATIENT)
Age: 58
End: 2023-06-16

## 2023-06-16 VITALS
SYSTOLIC BLOOD PRESSURE: 185 MMHG | HEART RATE: 101 BPM | DIASTOLIC BLOOD PRESSURE: 102 MMHG | WEIGHT: 227 LBS | BODY MASS INDEX: 35.55 KG/M2

## 2023-06-16 DIAGNOSIS — C73 MALIGNANT NEOPLASM OF THYROID GLAND: ICD-10-CM

## 2023-06-16 LAB
GLUCOSE BLDC GLUCOMTR-MCNC: 200
HBA1C MFR BLD HPLC: 9

## 2023-06-16 PROCEDURE — 83036 HEMOGLOBIN GLYCOSYLATED A1C: CPT | Mod: QW

## 2023-06-16 PROCEDURE — 82962 GLUCOSE BLOOD TEST: CPT

## 2023-06-16 PROCEDURE — 99214 OFFICE O/P EST MOD 30 MIN: CPT | Mod: 25

## 2023-06-16 RX ORDER — SITAGLIPTIN 100 MG/1
100 TABLET, FILM COATED ORAL
Qty: 30 | Refills: 5 | Status: COMPLETED | COMMUNITY
Start: 2023-04-25 | End: 2023-12-13

## 2023-06-20 LAB
T4 FREE SERPL-MCNC: 1.7 NG/DL
TSH SERPL-ACNC: 9.68 UIU/ML

## 2023-06-20 RX ORDER — FOSAPREPITANT DIMEGLUMINE 150 MG/5ML
150 INJECTION, POWDER, LYOPHILIZED, FOR SOLUTION INTRAVENOUS ONCE
Refills: 0 | Status: COMPLETED | OUTPATIENT
Start: 2023-06-21 | End: 2023-06-21

## 2023-06-20 RX ORDER — DEXAMETHASONE 0.5 MG/5ML
12 ELIXIR ORAL ONCE
Refills: 0 | Status: COMPLETED | OUTPATIENT
Start: 2023-06-21 | End: 2023-06-21

## 2023-06-20 RX ORDER — CYCLOPHOSPHAMIDE 100 MG
1260 VIAL (EA) INTRAVENOUS ONCE
Refills: 0 | Status: COMPLETED | OUTPATIENT
Start: 2023-06-21 | End: 2023-06-21

## 2023-06-20 RX ORDER — DOXORUBICIN HYDROCHLORIDE 2 MG/ML
126 INJECTION, SOLUTION INTRAVENOUS ONCE
Refills: 0 | Status: COMPLETED | OUTPATIENT
Start: 2023-06-21 | End: 2023-06-21

## 2023-06-20 RX ORDER — PALONOSETRON HYDROCHLORIDE 0.25 MG/5ML
0.25 INJECTION, SOLUTION INTRAVENOUS ONCE
Refills: 0 | Status: COMPLETED | OUTPATIENT
Start: 2023-06-21 | End: 2023-06-21

## 2023-06-20 RX ORDER — PEGFILGRASTIM-CBQV 6 MG/.6ML
6 INJECTION, SOLUTION SUBCUTANEOUS ONCE
Refills: 0 | Status: COMPLETED | OUTPATIENT
Start: 2023-06-21 | End: 2023-06-21

## 2023-06-20 RX ORDER — PEMBROLIZUMAB 25 MG/ML
400 INJECTION, SOLUTION INTRAVENOUS ONCE
Refills: 0 | Status: COMPLETED | OUTPATIENT
Start: 2023-06-21 | End: 2023-06-21

## 2023-06-20 NOTE — ASSESSMENT
[Action and use of Insulin] : action and use of short and long-acting insulin [Self Monitoring of Blood Glucose] : self monitoring of blood glucose [Insulin Self-Administration] : insulin self-administration [Injection Technique, Storage, Sharps Disposal] : injection technique, storage, and sharps disposal [FreeTextEntry1] : 1. T2D\par A1C goal <7%\par A1C - 9% (6/16/23) from 8.2% (9/19/22) from 7.5 (3/2019)\par Current regimen: Metformin 500mg (4 tablets daily), Jardiance 25mg daily\par Plan was to start Januvia 100mg following last appointment as long as ok by oncology, per patient, ok to start this regimen, but did not start yet\par Glucometer readings at home reveal increasing fasting BG to 140-150s\par Glucometer reading performed in office today is 200 (fasting)\par \par Kaleigh endorses experiencing rising BG, likely r/t steroid induced hyperglycemia, currently notes mild elevation of fasting BG without postprandial evaluation and A1C increase to 9%.  Reviewed impact of steroids on glucose.  Can plan to start Januvia today, but also discussed insulin therapy, in case hyperglycemia worsens, to initiate surrounding chemo cycles.  Insulin injection demonstration provided and discussed kinetics on insulin therapy.\par -- continue metformin\par -- continue jardiance 25mg\par -- start Januvia 100mg\par -- continue BG monitoring, both fasting and 2H postprandial or before bed, ok to alternate days. If BG elevate surrounding next chemo cycle (fasting persist >200), low threshold to start minimally basal insulin, for couple of days following dexamethasone regimen/chemo cycle\par \par 2-3. Postoperative hypothyrodism, h/o papillary thyroid cancer\par T3N0M0, per pathology\par S/p total thyroidectomy in 3/2017\par S/p I-131 30 mCi in July 2018\par Current regimen: Levothyroxine 275mcg daily\par TSH for goal 0.5-2.0 uIU/mL\par Last ultrasound 2018 with no evidence of structural recurrence/metastatic disease\par 9/2022 Tg AB <20 and Tg <0.2\par Most recent TSH inc to 10 with FT4 2.1 from TSH of 0.91 ~6 weeks prior\par Denies missed dosing or change to regimen\par -- repeat TFTs\par -- labs today, including Tg\par -- referral for thyroid US provided \par \par \par Follow-up in 2 months with myself or Dr Dodd\par \par Lucy Maldonado, MS, FNP-BC, [facility]\par 06/16/2023

## 2023-06-20 NOTE — ADDENDUM
[FreeTextEntry1] : 6/20/23, addendum, SG\par VM left in attempt to contact patient to review lab results\par TSH remains above goal with FT4 WNL\par With return call, plan to increase to 288 mcg QAM and repeat labs in 6 weeks

## 2023-06-20 NOTE — HISTORY OF PRESENT ILLNESS
[FreeTextEntry1] : HENRY PERDOMO is a 57 year female with pmhx of T2D, postoperative hypothyroidism, papillary thyroid cancer (s/p total thyroidectomy 3/2017), breast cancer (currently undergoing chemotherapy with plan for surgery and radiation therapy following completion of chemo) who presents for T2D and thyroid follow-up.\par \par Patient of Dr. Dodd, last visit, 23\par Last visit with myself, 2022\par \par Interval change:\par Since last visit with myself, saw Dr Dodd, plan was to confirm ok to start Januvia 100mg daily with oncologist, per patient ok to start and has not yet started this regimen\par Dx with triple negative breast cancer earlier this year, s/p chemotherapy cycles (11 of 12), receiving dexamethasone with cycles.  12th cycle cancelled this past Wednesday in setting of BP being elevated.  Stopped this chemo regimen, will be starting new type for next 2 months (2week cycles)\par Endorses sugar elevation with chemo, "nothing crazy, but higher"\par Plans for surgery and radiation therapy following chemotherapy completion\par Recent labs from 23 with TFTs reviewed with patient today\par \par \par A1C -  9% (23 on POCT) from 8.2% (22) from 7.5 (3/2019)\par Office Fingerstick -- 200 (fasting)\par \par Current medication:\par - Metformin 500mg 4 tablets daily\par - Jardiance 25mg daily\par - Januvia 100mg daily (Not yet started)\par \par Past medication:\par - NONE\par \par HENRY reports they take their diabetes medication most of the time.\par HENRY denies hypoglycemia symptoms or BG <70\par \par Diabetes Self-Management:\par Glucometer: None\par fasting B-150\par No postprandial BG readings\par \par Diet--\par ~2 meals daily\par Beverages: water, rare soda\par \par Ophthalmology: \par Podiatry: UTD\par \par Denies personal history of heart disease.\par Denies personal history of renal disease.\par \par 2-3. Postoperative hypothyroidism, h/o papillary thyroid cancer\par T3N0M0, per pathology: papillary thyroid carcinoma, multifocal (3 foci); follicular variant of papillary carcinoma, infiltrative, 0.5 cm focus in right lobe upper pole with limited extrathyroidal extension and positive margins; follicular variant of papillary carcinoma, two foci in left lobe, confined to the thyroid, a 1.3 cm circumscribed nodule in mid pole, and a 0.5 cm infiltrative focus in lower pole; no positive lymph nodes.\par S/p total thyroidectomy in 3/2017\par S/p I-131 30 mCi in 2018\par \par Current regimen: Levothyroxine 275mcg QAM\par \par

## 2023-06-21 ENCOUNTER — APPOINTMENT (OUTPATIENT)
Dept: INFUSION THERAPY | Facility: CLINIC | Age: 58
End: 2023-06-21

## 2023-06-21 ENCOUNTER — LABORATORY RESULT (OUTPATIENT)
Age: 58
End: 2023-06-21

## 2023-06-21 ENCOUNTER — APPOINTMENT (OUTPATIENT)
Dept: HEMATOLOGY ONCOLOGY | Facility: CLINIC | Age: 58
End: 2023-06-21
Payer: MEDICAID

## 2023-06-21 ENCOUNTER — OUTPATIENT (OUTPATIENT)
Dept: OUTPATIENT SERVICES | Facility: HOSPITAL | Age: 58
LOS: 1 days | End: 2023-06-21
Payer: MEDICAID

## 2023-06-21 VITALS
RESPIRATION RATE: 18 BRPM | TEMPERATURE: 98 F | HEART RATE: 74 BPM | SYSTOLIC BLOOD PRESSURE: 112 MMHG | DIASTOLIC BLOOD PRESSURE: 78 MMHG | OXYGEN SATURATION: 99 %

## 2023-06-21 VITALS
BODY MASS INDEX: 35 KG/M2 | OXYGEN SATURATION: 95 % | HEART RATE: 89 BPM | WEIGHT: 223 LBS | RESPIRATION RATE: 18 BRPM | TEMPERATURE: 98.2 F | DIASTOLIC BLOOD PRESSURE: 79 MMHG | HEIGHT: 67 IN | SYSTOLIC BLOOD PRESSURE: 125 MMHG

## 2023-06-21 VITALS
RESPIRATION RATE: 18 BRPM | HEART RATE: 78 BPM | OXYGEN SATURATION: 99 % | TEMPERATURE: 98 F | SYSTOLIC BLOOD PRESSURE: 114 MMHG | WEIGHT: 223.11 LBS | HEIGHT: 67 IN | DIASTOLIC BLOOD PRESSURE: 78 MMHG

## 2023-06-21 DIAGNOSIS — C50.919 MALIGNANT NEOPLASM OF UNSPECIFIED SITE OF UNSPECIFIED FEMALE BREAST: ICD-10-CM

## 2023-06-21 DIAGNOSIS — Z98.891 HISTORY OF UTERINE SCAR FROM PREVIOUS SURGERY: Chronic | ICD-10-CM

## 2023-06-21 DIAGNOSIS — Z98.890 OTHER SPECIFIED POSTPROCEDURAL STATES: Chronic | ICD-10-CM

## 2023-06-21 DIAGNOSIS — E07.9 DISORDER OF THYROID, UNSPECIFIED: ICD-10-CM

## 2023-06-21 DIAGNOSIS — L29.9 PRURITUS, UNSPECIFIED: ICD-10-CM

## 2023-06-21 LAB
ALBUMIN SERPL ELPH-MCNC: 4 G/DL
ALP BLD-CCNC: 66 U/L
ALT SERPL-CCNC: 44 U/L
ANION GAP SERPL CALC-SCNC: 15 MMOL/L
AST SERPL-CCNC: 45 U/L
BILIRUB SERPL-MCNC: 0.5 MG/DL
BUN SERPL-MCNC: 18 MG/DL
CALCIUM SERPL-MCNC: 10.5 MG/DL
CHLORIDE SERPL-SCNC: 101 MMOL/L
CO2 SERPL-SCNC: 26 MMOL/L
CREAT SERPL-MCNC: 1 MG/DL
EGFR: 66 ML/MIN/1.73M2
GLUCOSE SERPL-MCNC: 131 MG/DL
POTASSIUM SERPL-SCNC: 4.3 MMOL/L
PROT SERPL-MCNC: 8.6 G/DL
SODIUM SERPL-SCNC: 142 MMOL/L

## 2023-06-21 PROCEDURE — 99214 OFFICE O/P EST MOD 30 MIN: CPT | Mod: 25

## 2023-06-21 PROCEDURE — 96377 APPLICATON ON-BODY INJECTOR: CPT

## 2023-06-21 PROCEDURE — 96411 CHEMO IV PUSH ADDL DRUG: CPT

## 2023-06-21 PROCEDURE — 96367 TX/PROPH/DG ADDL SEQ IV INF: CPT

## 2023-06-21 PROCEDURE — 96375 TX/PRO/DX INJ NEW DRUG ADDON: CPT

## 2023-06-21 PROCEDURE — 96413 CHEMO IV INFUSION 1 HR: CPT

## 2023-06-21 PROCEDURE — 96417 CHEMO IV INFUS EACH ADDL SEQ: CPT

## 2023-06-21 PROCEDURE — 36415 COLL VENOUS BLD VENIPUNCTURE: CPT

## 2023-06-21 RX ADMIN — Medication 1260 MILLIGRAM(S): at 16:05

## 2023-06-21 RX ADMIN — PEGFILGRASTIM-CBQV 6 MILLIGRAM(S): 6 INJECTION, SOLUTION SUBCUTANEOUS at 16:07

## 2023-06-21 RX ADMIN — PEMBROLIZUMAB 400 MILLIGRAM(S): 25 INJECTION, SOLUTION INTRAVENOUS at 14:40

## 2023-06-21 RX ADMIN — DOXORUBICIN HYDROCHLORIDE 756 MILLIGRAM(S): 2 INJECTION, SOLUTION INTRAVENOUS at 15:20

## 2023-06-21 RX ADMIN — FOSAPREPITANT DIMEGLUMINE 150 MILLIGRAM(S): 150 INJECTION, POWDER, LYOPHILIZED, FOR SOLUTION INTRAVENOUS at 14:35

## 2023-06-21 RX ADMIN — Medication 1260 MILLIGRAM(S): at 15:35

## 2023-06-21 RX ADMIN — PEMBROLIZUMAB 400 MILLIGRAM(S): 25 INJECTION, SOLUTION INTRAVENOUS at 15:10

## 2023-06-21 RX ADMIN — Medication 300 UNIT(S): at 16:10

## 2023-06-21 RX ADMIN — FOSAPREPITANT DIMEGLUMINE 500 MILLIGRAM(S): 150 INJECTION, POWDER, LYOPHILIZED, FOR SOLUTION INTRAVENOUS at 14:05

## 2023-06-21 RX ADMIN — Medication 212 MILLIGRAM(S): at 13:50

## 2023-06-21 RX ADMIN — Medication 12 MILLIGRAM(S): at 14:05

## 2023-06-21 RX ADMIN — PALONOSETRON HYDROCHLORIDE 0.25 MILLIGRAM(S): 0.25 INJECTION, SOLUTION INTRAVENOUS at 14:40

## 2023-06-21 NOTE — PHYSICAL EXAM
[Restricted in physically strenuous activity but ambulatory and able to carry out work of a light or sedentary nature] : Status 1- Restricted in physically strenuous activity but ambulatory and able to carry out work of a light or sedentary nature, e.g., light house work, office work [Normal] : affect appropriate [de-identified] : left neck swelling [de-identified] : about 3cm palpable hard mass in right breast ~10 clock position 10cm from nipple, no palpable axillary nodes -- about 1cm mass in same area of palpable concern on exam 4/19/23; 5/31/23 no longer palpable , no lesions in right breast on exam, 6/14/23 - no palpable breast mass. [de-identified] : right arm rash, hives

## 2023-06-21 NOTE — HISTORY OF PRESENT ILLNESS
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 \par \par Here today for f/u and tx.\par \par Patient states she first palpated the right breast mass sometime in 2022 which prompted her to seek medical care. Patient's PCP Dr. Radha Sarah sent patient for imaging which yielded a 2.1cm lobulated heterogeneous mass on sonogram 10:00 10FN. She has no prior hx of breast surgery or bx. Denies family history of breast or ovarian cancer. Patient denies skin changes or nipple discharge bilaterally. \par \par 22: B/l MG (LHR)- heterogeneously dense. ROCKY. BI-RADS 2\par 23: R MG & B/l US (R palpable mass)- heterogenously dense. R 2.6 cm mass w/ heterogeneous calcs UOQ (palpable). US- R 2.1cm lobulated heterogeneous mass w/ calcs 10:00 10FN (palpable- rec US bx). L 0.6cm cyst 5:00 6FN. BI-RADS 4\par 23: R US bx 2.3cm mass 10:00 10FN (heart clip)- IDC (poorly differentiated), ER- (0%), [place]- (0%), HER2- (0). Concordant- recommend definitive surgical and oncological management. \par \par OBhx:\par Menarche 17yo\par LMP s/p partial hysterectomy at age 45yo\par  did not breastfeed, age at first birth 18yo. \par Denies h/o fertility treatments, denies h/o HRT and OCP [de-identified] : Denies fever/chills, cough, sob,chest pain, leg swelling. Numbness/tingling is mild w/o affecting function in hands and feet.\par \par Here for 1st AC and Pembro due on 6/21/23. \par \par \par

## 2023-06-21 NOTE — ASSESSMENT
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 \par \par Patient states she first palpated the right breast mass sometime in February 2022 which prompted her to seek medical care. Patient's PCP Dr. Radha Sarah sent patient for imaging which yielded a 2.1cm lobulated heterogeneous mass on sonogram 10:00 10FN. She has no prior hx of breast surgery or bx. Denies family history of breast or ovarian cancer. Patient denies skin changes or nipple discharge bilaterally. \par \par Reviewed clinical utility of neoadjuvant chemoimmunotherapy as per the Keynote 522 clinical trial - recommending starting weekly carbo/taxol x12 followed by adriamycin/cytoxan every 2 weeks with continued pembrolizumab every 6 weeks throughout duration of chemotherapy IV through chemoport with growth factor support (Neulasta onbody injector) after AC. \par Reviewed potential side effects of chemotherapy including but not limited to allergic reactions/infusion reactions, bone marrow suppression leading to fatigue and increased risk of infection and febrile neutropenia,anemia and thrombocytopenia needing supportive transfusions,hair loss, skin/nail changes, nausea/vomiting, stomatitis, abdominal pain, diarrhea/constipation, kidney and liver dysfunction with electrolyte disturbances, peripheral neuropathy, small risk of cardiomyopathy and secondary malignancies (ie hematologic malignancies due to chemotherapy). Also discussed potential side effects of immunotherapy (pembro) including inflammation of colon leading to colitis, inflammation of lining of lung or heart for example or other organs leading to pleuritis and pericarditis respectively, also other possible conditions such as hypophysitis or pancreatitis; reviewed treatment in such a case would involve stopping of the offending agent and initiating steroids. \par \par Re-reviewed above risks of treatment, patient had all her questions answered and expressed verbal understanding and agreement to proceed with treatment. She signed a consent form. \par \par \par 5/31/23 To proceed with week 10*(correction) of treatment (off carboplatin due to neutropenia with week 4 of treatment) to proceed with Taxol; Pembro was given on 3/29, 5/10 and will continue every 6 wks.\par Compazine 10mg PO Q6hrs as needed for nausea/vomiting. \par Cr increased today to 1.3 from 1.0 likely due to pembro administration on 5/10. To continue to monitor, encouraged patient to increase oral hydration.\par Will continue to monitor peripheral neuropathy affecting her hands, for now at grade 1. \par \par 06/07/23\par ANC at goal, grade 1 neuropathy present. \par Proceed with week 11 of Taxol. \par Reports left throat pain when swallowing. Possible mucositis. Will prescribe MMW. If no improvement, will refer to ENT. \par Will continue to monitor right arm pruritic rash, may be grade 1 IrAE. Recommend topical steroid. \par \par 6/14/23 Pt tearful today, states exhausted, neuropathy still grade 1 in hands, and 1to 2 in feet.\par Decided to hold Taxol today #12/12 today. \par To start AC #1 with growth factor, and Pembro on 6/21/23. \par On exam good response, no palpable breast mass. \par MMW prn for throat pain which patient found helpful. \par Dr Villa referral for anxiety.\par \par 6/21/23 To proceed with 1st AC treatment with Neulasta Onpro today. \par Clinically with excellent response to treatment and good tolerance. \par AE - grade 1 peripheral neuropathy in hands/feet. \par Compazine prn nausea. \par \par \par RTC in two weeks for  AC#2 with onbody Neulasta Onpro ),  Needs LABS (CBC w/ diff, CMP, TSH).

## 2023-06-23 LAB — TSH SERPL-ACNC: 2.54 UIU/ML

## 2023-06-28 ENCOUNTER — NON-APPOINTMENT (OUTPATIENT)
Age: 58
End: 2023-06-28

## 2023-06-28 ENCOUNTER — APPOINTMENT (OUTPATIENT)
Dept: INFUSION THERAPY | Facility: CLINIC | Age: 58
End: 2023-06-28

## 2023-06-30 ENCOUNTER — NON-APPOINTMENT (OUTPATIENT)
Age: 58
End: 2023-06-30

## 2023-06-30 ENCOUNTER — APPOINTMENT (OUTPATIENT)
Dept: HEMATOLOGY ONCOLOGY | Facility: CLINIC | Age: 58
End: 2023-06-30

## 2023-07-05 ENCOUNTER — APPOINTMENT (OUTPATIENT)
Dept: HEMATOLOGY ONCOLOGY | Facility: CLINIC | Age: 58
End: 2023-07-05
Payer: MEDICAID

## 2023-07-05 ENCOUNTER — LABORATORY RESULT (OUTPATIENT)
Age: 58
End: 2023-07-05

## 2023-07-05 VITALS
OXYGEN SATURATION: 97 % | SYSTOLIC BLOOD PRESSURE: 150 MMHG | HEART RATE: 104 BPM | BODY MASS INDEX: 34.84 KG/M2 | WEIGHT: 222 LBS | TEMPERATURE: 98.3 F | DIASTOLIC BLOOD PRESSURE: 94 MMHG | RESPIRATION RATE: 18 BRPM | HEIGHT: 67 IN

## 2023-07-05 LAB
FERRITIN SERPL-MCNC: 292 NG/ML
IRON SATN MFR SERPL: 23 %
IRON SERPL-MCNC: 63 UG/DL
TIBC SERPL-MCNC: 273 UG/DL
UIBC SERPL-MCNC: 210 UG/DL

## 2023-07-05 PROCEDURE — 99215 OFFICE O/P EST HI 40 MIN: CPT | Mod: 25

## 2023-07-05 PROCEDURE — 36415 COLL VENOUS BLD VENIPUNCTURE: CPT

## 2023-07-05 RX ORDER — GABAPENTIN 300 MG/1
300 CAPSULE ORAL
Qty: 60 | Refills: 3 | Status: ACTIVE | COMMUNITY
Start: 2023-07-05 | End: 1900-01-01

## 2023-07-05 NOTE — ASSESSMENT
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 \par \par Patient states she first palpated the right breast mass sometime in February 2022 which prompted her to seek medical care. Patient's PCP Dr. Radha Sarah sent patient for imaging which yielded a 2.1cm lobulated heterogeneous mass on sonogram 10:00 10FN. She has no prior hx of breast surgery or bx. Denies family history of breast or ovarian cancer. Patient denies skin changes or nipple discharge bilaterally. \par \par Reviewed clinical utility of neoadjuvant chemoimmunotherapy as per the Keynote 522 clinical trial - recommending starting weekly carbo/taxol x12 followed by adriamycin/cytoxan every 2 weeks with continued pembrolizumab every 6 weeks throughout duration of chemotherapy IV through chemoport with growth factor support (Neulasta onbody injector) after AC. \par Reviewed potential side effects of chemotherapy including but not limited to allergic reactions/infusion reactions, bone marrow suppression leading to fatigue and increased risk of infection and febrile neutropenia,anemia and thrombocytopenia needing supportive transfusions,hair loss, skin/nail changes, nausea/vomiting, stomatitis, abdominal pain, diarrhea/constipation, kidney and liver dysfunction with electrolyte disturbances, peripheral neuropathy, small risk of cardiomyopathy and secondary malignancies (ie hematologic malignancies due to chemotherapy). Also discussed potential side effects of immunotherapy (pembro) including inflammation of colon leading to colitis, inflammation of lining of lung or heart for example or other organs leading to pleuritis and pericarditis respectively, also other possible conditions such as hypophysitis or pancreatitis; reviewed treatment in such a case would involve stopping of the offending agent and initiating steroids. \par \par Re-reviewed above risks of treatment, patient had all her questions answered and expressed verbal understanding and agreement to proceed with treatment. She signed a consent form. \par \par \par 5/31/23 To proceed with week 10*(correction) of treatment (off carboplatin due to neutropenia with week 4 of treatment) to proceed with Taxol; Pembro was given on 3/29, 5/10 and will continue every 6 wks.\par Compazine 10mg PO Q6hrs as needed for nausea/vomiting. \par Cr increased today to 1.3 from 1.0 likely due to pembro administration on 5/10. To continue to monitor, encouraged patient to increase oral hydration.\par Will continue to monitor peripheral neuropathy affecting her hands, for now at grade 1. \par \par 06/07/23\par ANC at goal, grade 1 neuropathy present. \par Proceed with week 11 of Taxol. \par Reports left throat pain when swallowing. Possible mucositis. Will prescribe MMW. If no improvement, will refer to ENT. \par Will continue to monitor right arm pruritic rash, may be grade 1 IrAE. Recommend topical steroid. \par \par 6/14/23 Pt tearful today, states exhausted, neuropathy still grade 1 in hands, and 1to 2 in feet.\par Decided to hold Taxol today #12/12 today. \par To start AC #1 with growth factor, and Pembro on 6/21/23. \par On exam good response, no palpable breast mass. \par MMW prn for throat pain which patient found helpful. \par Dr Villa referral for anxiety.\par \par 6/21/23 To proceed with 1st AC treatment with Neulasta Onpro today and Pembro.\par Clinically with excellent response to treatment and good tolerance. \par AE - grade 1 peripheral neuropathy in hands/feet. \par Compazine prn nausea. \par \par 7/5/23 \par Pt with SOB on exertion after last treatment (AC and Pembro), no leg swelling. Symptoms present but much improved. [facility] but with cavitary lesions which pt notes could be due to her previous diagnosis of BOOP.\par To check EKG, Echo, pulm evaluation with CT chest and bronchoscopy (discussed with Dr Melvina Campa). \par Holding treatment today. \par \par RTC in two weeks for  treatment and follow up.

## 2023-07-05 NOTE — HISTORY OF PRESENT ILLNESS
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 \par \par Here today for f/u s/p ER . \par \par Patient states she first palpated the right breast mass sometime in 2022 which prompted her to seek medical care. Patient's PCP Dr. Radha Sarah sent patient for imaging which yielded a 2.1cm lobulated heterogeneous mass on sonogram 10:00 10FN. She has no prior hx of breast surgery or bx. Denies family history of breast or ovarian cancer. Patient denies skin changes or nipple discharge bilaterally. \par \par 22: B/l MG (LHR)- heterogeneously dense. ROCKY. BI-RADS 2\par 23: R MG & B/l US (R palpable mass)- heterogenously dense. R 2.6 cm mass w/ heterogeneous calcs UOQ (palpable). US- R 2.1cm lobulated heterogeneous mass w/ calcs 10:00 10FN (palpable- rec US bx). L 0.6cm cyst 5:00 6FN. BI-RADS 4\par 23: R US bx 2.3cm mass 10:00 10FN (heart clip)- IDC (poorly differentiated), ER- (0%), [place]- (0%), HER2- (0). Concordant- recommend definitive surgical and oncological management. \par \par OBhx:\par Menarche 15yo\par LMP s/p partial hysterectomy at age 45yo\par  did not breastfeed, age at first birth 20yo. \par Denies h/o fertility treatments, denies h/o HRT and OCP [de-identified] : Reports numbness/tingling in b/l hands and feet, able to use zipper, hold a cup, walk w/o loosing balance. Denies fever/chills, cough but reports shortness of breath,for which she was seen at the [facility] ER with CTA ruling out PE but showing cavitary lesions. 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 States her shortness of breath is present on exertion, improved significantly compared to after the last treatment but still present.

## 2023-07-05 NOTE — REVIEW OF SYSTEMS
[Diarrhea: Grade 0] : Diarrhea: Grade 0 [Negative] : Integumentary [Shortness Of Breath] : shortness of breath [SOB on Exertion] : shortness of breath during exertion [Skin Wound] : no skin wound

## 2023-07-05 NOTE — PHYSICAL EXAM
[Normal] : affect appropriate [Ambulatory and capable of all self care but unable to carry out any work activities] : Status 2- Ambulatory and capable of all self care but unable to carry out any work activities. Up and about more than 50% of waking hours [de-identified] : crackles at bases bilaterally [de-identified] : about 3cm palpable hard mass in right breast ~10 clock position 10cm from nipple, no palpable axillary nodes -- about 1cm mass in same area of palpable concern on exam 4/19/23; 5/31/23 no longer palpable , no lesions in right breast on exam, 6/14/23 - no palpable breast mass. [de-identified] : right arm rash, hives

## 2023-07-06 LAB
ALBUMIN SERPL ELPH-MCNC: 3.7 G/DL
ALP BLD-CCNC: 95 U/L
ALT SERPL-CCNC: 25 U/L
ANION GAP SERPL CALC-SCNC: 13 MMOL/L
AST SERPL-CCNC: 27 U/L
BILIRUB SERPL-MCNC: 0.4 MG/DL
BUN SERPL-MCNC: 15 MG/DL
CALCIUM SERPL-MCNC: 10 MG/DL
CHLORIDE SERPL-SCNC: 105 MMOL/L
CO2 SERPL-SCNC: 27 MMOL/L
CREAT SERPL-MCNC: 1 MG/DL
EGFR: 66 ML/MIN/1.73M2
FOLATE SERPL-MCNC: 8.8 NG/ML
GLUCOSE SERPL-MCNC: 203 MG/DL
POTASSIUM SERPL-SCNC: 4.1 MMOL/L
PROT SERPL-MCNC: 7.8 G/DL
SODIUM SERPL-SCNC: 145 MMOL/L
VIT B12 SERPL-MCNC: >2000 PG/ML

## 2023-07-10 ENCOUNTER — APPOINTMENT (OUTPATIENT)
Dept: CT IMAGING | Facility: HOSPITAL | Age: 58
End: 2023-07-10

## 2023-07-10 ENCOUNTER — NON-APPOINTMENT (OUTPATIENT)
Age: 58
End: 2023-07-10

## 2023-07-12 ENCOUNTER — NON-APPOINTMENT (OUTPATIENT)
Age: 58
End: 2023-07-12

## 2023-07-12 LAB — METHYLMALONATE SERPL-SCNC: 152 NMOL/L

## 2023-07-14 ENCOUNTER — RESULT REVIEW (OUTPATIENT)
Age: 58
End: 2023-07-14

## 2023-07-14 ENCOUNTER — OUTPATIENT (OUTPATIENT)
Dept: OUTPATIENT SERVICES | Facility: HOSPITAL | Age: 58
LOS: 1 days | Discharge: HOME | End: 2023-07-14
Payer: MEDICAID

## 2023-07-14 ENCOUNTER — NON-APPOINTMENT (OUTPATIENT)
Age: 58
End: 2023-07-14

## 2023-07-14 ENCOUNTER — APPOINTMENT (OUTPATIENT)
Dept: PULMONOLOGY | Facility: HOSPITAL | Age: 58
End: 2023-07-14

## 2023-07-14 VITALS
HEART RATE: 97 BPM | DIASTOLIC BLOOD PRESSURE: 81 MMHG | WEIGHT: 222.01 LBS | HEIGHT: 67 IN | TEMPERATURE: 98 F | RESPIRATION RATE: 19 BRPM | SYSTOLIC BLOOD PRESSURE: 134 MMHG | OXYGEN SATURATION: 97 %

## 2023-07-14 DIAGNOSIS — Z98.890 OTHER SPECIFIED POSTPROCEDURAL STATES: Chronic | ICD-10-CM

## 2023-07-14 DIAGNOSIS — Z90.710 ACQUIRED ABSENCE OF BOTH CERVIX AND UTERUS: Chronic | ICD-10-CM

## 2023-07-14 DIAGNOSIS — Z98.891 HISTORY OF UTERINE SCAR FROM PREVIOUS SURGERY: Chronic | ICD-10-CM

## 2023-07-14 LAB
APTT BLD: 29.3 SEC — SIGNIFICANT CHANGE UP (ref 27.5–35.5)
GLUCOSE BLDC GLUCOMTR-MCNC: 162 MG/DL — HIGH (ref 70–99)
GRAM STN FLD: SIGNIFICANT CHANGE UP
GRAM STN FLD: SIGNIFICANT CHANGE UP
INR BLD: 0.98 — SIGNIFICANT CHANGE UP (ref 0.88–1.16)
PROTHROM AB SERPL-ACNC: 11.6 SEC — SIGNIFICANT CHANGE UP (ref 10.5–13.4)
SPECIMEN SOURCE: SIGNIFICANT CHANGE UP
SPECIMEN SOURCE: SIGNIFICANT CHANGE UP

## 2023-07-14 PROCEDURE — 88305 TISSUE EXAM BY PATHOLOGIST: CPT

## 2023-07-14 PROCEDURE — 85730 THROMBOPLASTIN TIME PARTIAL: CPT

## 2023-07-14 PROCEDURE — 31628 BRONCHOSCOPY/LUNG BX EACH: CPT | Mod: GC

## 2023-07-14 PROCEDURE — 88112 CYTOPATH CELL ENHANCE TECH: CPT

## 2023-07-14 PROCEDURE — 88173 CYTOPATH EVAL FNA REPORT: CPT | Mod: 26

## 2023-07-14 PROCEDURE — 31624 DX BRONCHOSCOPE/LAVAGE: CPT | Mod: GC

## 2023-07-14 PROCEDURE — 71045 X-RAY EXAM CHEST 1 VIEW: CPT | Mod: 26

## 2023-07-14 PROCEDURE — 31899 UNLISTED PX TRACHEA BRONCHI: CPT

## 2023-07-14 PROCEDURE — 88173 CYTOPATH EVAL FNA REPORT: CPT

## 2023-07-14 PROCEDURE — 31627 NAVIGATIONAL BRONCHOSCOPY: CPT

## 2023-07-14 PROCEDURE — S2900: CPT

## 2023-07-14 PROCEDURE — 36415 COLL VENOUS BLD VENIPUNCTURE: CPT

## 2023-07-14 PROCEDURE — 71045 X-RAY EXAM CHEST 1 VIEW: CPT

## 2023-07-14 PROCEDURE — 31654 BRONCH EBUS IVNTJ PERPH LES: CPT | Mod: GC

## 2023-07-14 PROCEDURE — 87206 SMEAR FLUORESCENT/ACID STAI: CPT

## 2023-07-14 PROCEDURE — 87015 SPECIMEN INFECT AGNT CONCNTJ: CPT

## 2023-07-14 PROCEDURE — 82962 GLUCOSE BLOOD TEST: CPT

## 2023-07-14 PROCEDURE — 76000 FLUOROSCOPY <1 HR PHYS/QHP: CPT

## 2023-07-14 PROCEDURE — 85610 PROTHROMBIN TIME: CPT

## 2023-07-14 PROCEDURE — 88112 CYTOPATH CELL ENHANCE TECH: CPT | Mod: 26,59

## 2023-07-14 PROCEDURE — 88305 TISSUE EXAM BY PATHOLOGIST: CPT | Mod: 26

## 2023-07-14 PROCEDURE — 31627 NAVIGATIONAL BRONCHOSCOPY: CPT | Mod: GC

## 2023-07-14 PROCEDURE — 31629 BRONCHOSCOPY/NEEDLE BX EACH: CPT | Mod: GC

## 2023-07-14 NOTE — PRE-ANESTHESIA EVALUATION ADULT - NSANTHPMHFT_GEN_ALL_CORE
METS>4  thyroid ca s/p thyroidectomy, NIDDM, HTN, breast ca on chemotx c/b peripheral neuropathy  new ABAD ?2/2 chemotx

## 2023-07-14 NOTE — PRE-ANESTHESIA EVALUATION ADULT - NSANTHOSAYNRD_GEN_A_CORE
No. RAISSA screening performed.  STOP BANG Legend: 0-2 = LOW Risk; 3-4 = INTERMEDIATE Risk; 5-8 = HIGH Risk

## 2023-07-15 LAB
NIGHT BLUE STAIN TISS: SIGNIFICANT CHANGE UP
NIGHT BLUE STAIN TISS: SIGNIFICANT CHANGE UP
SPECIMEN SOURCE: SIGNIFICANT CHANGE UP
SPECIMEN SOURCE: SIGNIFICANT CHANGE UP

## 2023-07-16 LAB
CULTURE RESULTS: NO GROWTH — SIGNIFICANT CHANGE UP
SPECIMEN SOURCE: SIGNIFICANT CHANGE UP

## 2023-07-17 NOTE — ASSESSMENT
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 \par \par Patient states she first palpated the right breast mass sometime in February 2022 which prompted her to seek medical care. Patient's PCP Dr. Radha Sarah sent patient for imaging which yielded a 2.1cm lobulated heterogeneous mass on sonogram 10:00 10FN. She has no prior hx of breast surgery or bx. Denies family history of breast or ovarian cancer. Patient denies skin changes or nipple discharge bilaterally. \par \par Reviewed clinical utility of neoadjuvant chemoimmunotherapy as per the Keynote 522 clinical trial - recommending starting weekly carbo/taxol x12 followed by adriamycin/cytoxan every 2 weeks with continued pembrolizumab every 6 weeks throughout duration of chemotherapy IV through chemoport with growth factor support (Neulasta onbody injector) after AC. \par Reviewed potential side effects of chemotherapy including but not limited to allergic reactions/infusion reactions, bone marrow suppression leading to fatigue and increased risk of infection and febrile neutropenia,anemia and thrombocytopenia needing supportive transfusions,hair loss, skin/nail changes, nausea/vomiting, stomatitis, abdominal pain, diarrhea/constipation, kidney and liver dysfunction with electrolyte disturbances, peripheral neuropathy, small risk of cardiomyopathy and secondary malignancies (ie hematologic malignancies due to chemotherapy). Also discussed potential side effects of immunotherapy (pembro) including inflammation of colon leading to colitis, inflammation of lining of lung or heart for example or other organs leading to pleuritis and pericarditis respectively, also other possible conditions such as hypophysitis or pancreatitis; reviewed treatment in such a case would involve stopping of the offending agent and initiating steroids. \par \par Re-reviewed above risks of treatment, patient had all her questions answered and expressed verbal understanding and agreement to proceed with treatment. She signed a consent form. \par \par \par 5/31/23 To proceed with week 10*(correction) of treatment (off carboplatin due to neutropenia with week 4 of treatment) to proceed with Taxol; Pembro was given on 3/29, 5/10 and will continue every 6 wks.\par Compazine 10mg PO Q6hrs as needed for nausea/vomiting. \par Cr increased today to 1.3 from 1.0 likely due to pembro administration on 5/10. To continue to monitor, encouraged patient to increase oral hydration.\par Will continue to monitor peripheral neuropathy affecting her hands, for now at grade 1. \par \par 06/07/23\par ANC at goal, grade 1 neuropathy present. \par Proceed with week 11 of Taxol. \par Reports left throat pain when swallowing. Possible mucositis. Will prescribe MMW. If no improvement, will refer to ENT. \par Will continue to monitor right arm pruritic rash, may be grade 1 IrAE. Recommend topical steroid. \par \par 6/14/23 Pt tearful today, states exhausted, neuropathy still grade 1 in hands, and 1to 2 in feet.\par Decided to hold Taxol today #12/12 today. \par To start AC #1 with growth factor, and Pembro on 6/21/23. \par On exam good response, no palpable breast mass. \par MMW prn for throat pain which patient found helpful. \par Dr Villa referral for anxiety.\par \par 6/21/23 To proceed with 1st AC treatment with Neulasta Onpro today and Pembro.\par Clinically with excellent response to treatment and good tolerance. \par AE - grade 1 peripheral neuropathy in hands/feet. \par Compazine prn nausea. \par \par 7/5/23 \par Pt with SOB on exertion after last treatment (AC and Pembro), no leg swelling. Symptoms present but much improved. CTA done in ER at [facility] negative for PE but with cavitary lesions which pt notes could be due to her previous diagnosis of BOOP.\par To check EKG, Echo, pulm evaluation with CT chest and bronchoscopy (discussed with Dr Melvina Campa). \par Holding treatment today. \par \par RTC in two weeks for  treatment and follow up.

## 2023-07-17 NOTE — REVIEW OF SYSTEMS
[Shortness Of Breath] : shortness of breath [SOB on Exertion] : shortness of breath during exertion [Diarrhea: Grade 0] : Diarrhea: Grade 0 [Skin Wound] : no skin wound [Negative] : Allergic/Immunologic

## 2023-07-17 NOTE — DISCUSSION/SUMMARY
[FreeTextEntry1] : Patient called with a few complaints, likely all related to chemo with hair loss, scalp tenderness/pain, brief epistaxis episodes, also numbness/tingling in both hands but more on L, also some shoulder and elbow pain. I recommended Tylenol prn (max 4g/24hr), Ayr nasal gel and close observation at numbness/tingling reviewing that treatment on Wed may be held due to the likely neuropathy. Reviewed reasons for ER visit - chest pain, sob, pires. She expressed verbal understanding and had all questions answered.  [Home] : at home, [unfilled] , at the time of the visit.

## 2023-07-17 NOTE — HISTORY OF PRESENT ILLNESS
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 \par \par Here today for f/u and tx. \par \par Patient states she first palpated the right breast mass sometime in 2022 which prompted her to seek medical care. Patient's PCP Dr. Radha Sarah sent patient for imaging which yielded a 2.1cm lobulated heterogeneous mass on sonogram 10:00 10FN. She has no prior hx of breast surgery or bx. Denies family history of breast or ovarian cancer. Patient denies skin changes or nipple discharge bilaterally. \par \par 22: B/l MG (LHR)- heterogeneously dense. ROCKY. BI-RADS 2\par 23: R MG & B/l US (R palpable mass)- heterogenously dense. R 2.6 cm mass w/ heterogeneous calcs UOQ (palpable). US- R 2.1cm lobulated heterogeneous mass w/ calcs 10:00 10FN (palpable- rec US bx). L 0.6cm cyst 5:00 6FN. BI-RADS 4\par 23: R US bx 2.3cm mass 10:00 10FN (heart clip)- IDC (poorly differentiated), ER- (0%), AL- (0%), HER2- (0). Concordant- recommend definitive surgical and oncological management. \par \par OBhx:\par Menarche 17yo\par LMP s/p partial hysterectomy at age 47yo\par  did not breastfeed, age at first birth 18yo. \par Denies h/o fertility treatments, denies h/o HRT and OCP [de-identified] : Reports numbness/tingling in b/l hands and feet, able to use zipper, hold a cup, walk w/o loosing balance. Denies fever/chills, cough but reports shortness of breath,for which she was seen at the [facility] ER with CTA ruling out PE but showing cavitary lesions. 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 States her shortness of breath is present on exertion, improved significantly compared to after the last treatment but still present.

## 2023-07-17 NOTE — PHYSICAL EXAM
[Ambulatory and capable of all self care but unable to carry out any work activities] : Status 2- Ambulatory and capable of all self care but unable to carry out any work activities. Up and about more than 50% of waking hours [Normal] : affect appropriate [de-identified] : crackles at bases bilaterally [de-identified] : about 3cm palpable hard mass in right breast ~10 clock position 10cm from nipple, no palpable axillary nodes -- about 1cm mass in same area of palpable concern on exam 4/19/23; 5/31/23 no longer palpable , no lesions in right breast on exam, 6/14/23 - no palpable breast mass. [de-identified] : right arm rash, hives

## 2023-07-18 ENCOUNTER — NON-APPOINTMENT (OUTPATIENT)
Age: 58
End: 2023-07-18

## 2023-07-18 LAB
NON-GYNECOLOGICAL CYTOLOGY STUDY: SIGNIFICANT CHANGE UP
NON-GYNECOLOGICAL CYTOLOGY STUDY: SIGNIFICANT CHANGE UP
SURGICAL PATHOLOGY STUDY: SIGNIFICANT CHANGE UP

## 2023-07-19 ENCOUNTER — OUTPATIENT (OUTPATIENT)
Dept: OUTPATIENT SERVICES | Facility: HOSPITAL | Age: 58
LOS: 1 days | End: 2023-07-19
Payer: MEDICAID

## 2023-07-19 ENCOUNTER — APPOINTMENT (OUTPATIENT)
Dept: HEMATOLOGY ONCOLOGY | Facility: CLINIC | Age: 58
End: 2023-07-19
Payer: MEDICAID

## 2023-07-19 DIAGNOSIS — Z98.890 OTHER SPECIFIED POSTPROCEDURAL STATES: Chronic | ICD-10-CM

## 2023-07-19 DIAGNOSIS — C50.911 MALIGNANT NEOPLASM OF UNSPECIFIED SITE OF RIGHT FEMALE BREAST: ICD-10-CM

## 2023-07-19 DIAGNOSIS — Z90.710 ACQUIRED ABSENCE OF BOTH CERVIX AND UTERUS: Chronic | ICD-10-CM

## 2023-07-19 DIAGNOSIS — Z98.891 HISTORY OF UTERINE SCAR FROM PREVIOUS SURGERY: Chronic | ICD-10-CM

## 2023-07-19 LAB
CULTURE RESULTS: NO GROWTH — SIGNIFICANT CHANGE UP
SPECIMEN SOURCE: SIGNIFICANT CHANGE UP

## 2023-07-19 PROCEDURE — 93306 TTE W/DOPPLER COMPLETE: CPT | Mod: 26

## 2023-07-19 PROCEDURE — 87116 MYCOBACTERIA CULTURE: CPT

## 2023-07-19 PROCEDURE — 87102 FUNGUS ISOLATION CULTURE: CPT

## 2023-07-19 PROCEDURE — C8929: CPT

## 2023-07-19 PROCEDURE — 87070 CULTURE OTHR SPECIMN AEROBIC: CPT

## 2023-07-20 ENCOUNTER — APPOINTMENT (OUTPATIENT)
Dept: PULMONOLOGY | Facility: CLINIC | Age: 58
End: 2023-07-20
Payer: MEDICAID

## 2023-07-20 VITALS
WEIGHT: 223 LBS | BODY MASS INDEX: 35 KG/M2 | TEMPERATURE: 97.7 F | OXYGEN SATURATION: 99 % | HEIGHT: 67 IN | HEART RATE: 99 BPM | DIASTOLIC BLOOD PRESSURE: 95 MMHG | RESPIRATION RATE: 12 BRPM | SYSTOLIC BLOOD PRESSURE: 165 MMHG

## 2023-07-20 PROCEDURE — 94727 GAS DIL/WSHOT DETER LNG VOL: CPT

## 2023-07-20 PROCEDURE — 99203 OFFICE O/P NEW LOW 30 MIN: CPT

## 2023-07-20 PROCEDURE — 99213 OFFICE O/P EST LOW 20 MIN: CPT | Mod: 25

## 2023-07-20 PROCEDURE — 94729 DIFFUSING CAPACITY: CPT

## 2023-07-20 PROCEDURE — 94060 EVALUATION OF WHEEZING: CPT

## 2023-07-21 ENCOUNTER — APPOINTMENT (OUTPATIENT)
Dept: HEMATOLOGY ONCOLOGY | Facility: CLINIC | Age: 58
End: 2023-07-21
Payer: MEDICAID

## 2023-07-21 ENCOUNTER — LABORATORY RESULT (OUTPATIENT)
Age: 58
End: 2023-07-21

## 2023-07-21 ENCOUNTER — APPOINTMENT (OUTPATIENT)
Dept: INFUSION THERAPY | Facility: CLINIC | Age: 58
End: 2023-07-21

## 2023-07-21 VITALS
HEIGHT: 67 IN | TEMPERATURE: 98.4 F | SYSTOLIC BLOOD PRESSURE: 164 MMHG | WEIGHT: 225 LBS | RESPIRATION RATE: 18 BRPM | BODY MASS INDEX: 35.31 KG/M2 | HEART RATE: 92 BPM | DIASTOLIC BLOOD PRESSURE: 84 MMHG | OXYGEN SATURATION: 97 %

## 2023-07-21 DIAGNOSIS — J98.4 OTHER DISORDERS OF LUNG: ICD-10-CM

## 2023-07-21 LAB
ALBUMIN SERPL ELPH-MCNC: 3.7 G/DL
ALP BLD-CCNC: 61 U/L
ALT SERPL-CCNC: 25 U/L
ANION GAP SERPL CALC-SCNC: 8 MMOL/L
AST SERPL-CCNC: 27 U/L
BILIRUB SERPL-MCNC: 0.5 MG/DL
BUN SERPL-MCNC: 15 MG/DL
CALCIUM SERPL-MCNC: 10.2 MG/DL
CHLORIDE SERPL-SCNC: 109 MMOL/L
CO2 SERPL-SCNC: 30 MMOL/L
CREAT SERPL-MCNC: 1 MG/DL
EGFR: 66 ML/MIN/1.73M2
GLUCOSE SERPL-MCNC: 165 MG/DL
POTASSIUM SERPL-SCNC: 5.4 MMOL/L
PROT SERPL-MCNC: 8.1 G/DL
SODIUM SERPL-SCNC: 147 MMOL/L

## 2023-07-21 PROCEDURE — 36415 COLL VENOUS BLD VENIPUNCTURE: CPT

## 2023-07-21 PROCEDURE — 99214 OFFICE O/P EST MOD 30 MIN: CPT | Mod: 25

## 2023-07-21 NOTE — HISTORY OF PRESENT ILLNESS
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 \par \par Here today for f/u and tx. \par \par Patient states she first palpated the right breast mass sometime in 2022 which prompted her to seek medical care. Patient's PCP Dr. Radha Sarah sent patient for imaging which yielded a 2.1cm lobulated heterogeneous mass on sonogram 10:00 10FN. She has no prior hx of breast surgery or bx. Denies family history of breast or ovarian cancer. Patient denies skin changes or nipple discharge bilaterally. \par \par 22: B/l MG (LHR)- heterogeneously dense. ROCKY. BI-RADS 2\par 23: R MG & B/l US (R palpable mass)- heterogenously dense. R 2.6 cm mass w/ heterogeneous calcs UOQ (palpable). US- R 2.1cm lobulated heterogeneous mass w/ calcs 10:00 10FN (palpable- rec US bx). L 0.6cm cyst 5:00 6FN. BI-RADS 4\par 23: R US bx 2.3cm mass 10:00 10FN (heart clip)- IDC (poorly differentiated), ER- (0%), CT- (0%), HER2- (0). Concordant- recommend definitive surgical and oncological management. \par \par OBhx:\par Menarche 17yo\par LMP s/p partial hysterectomy at age 47yo\par  did not breastfeed, age at first birth 20yo. \par Denies h/o fertility treatments, denies h/o HRT and OCP [de-identified] : Patient states the previous shortness of breath resolved and she feels at her baseline. \par \par s/p Echo on 7/19 with decrease in EF down to 55% from 70%. \par \par At last visit, patient with shortness of breath,for which she was seen at the [facility] ER with CTA ruling out PE but showing cavitary lesions. 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 States her shortness of breath is present on exertion, improved significantly compared to after the last treatment but still present.

## 2023-07-21 NOTE — ASSESSMENT
[FreeTextEntry1] : 58 yof with Tripple negative breast cancer, DM, HTN, HLD,  with ?hx of cystic lung disease and BOOP (15 years ago), Papillary thyroid ca s/p thyroidectomy referred to IP for abnormal CT scan performed at OSH with multiple peribronchovascular lung nodules now s/p robot assisted lung biopsy.\par \par PFT: FVC 2.4L (77%), FEV1 1.88L, FEV1/FVC 78, TLC 3.18 (58%),  DLCO 51%. Mild restriction, moderately reduced DLCO\par Patient does have peribronchial bilateral lung changes described as inflammatory /infectious in appearance, although malignancy is possible. Sarcoidosis comes to mind as possible cause of lung opacities especially considering peribronchial appearance and fact that lesions resolved spontaneously w/o any intervention.\par No treatment for lung disease is indicated at this time based on patient pft and lung good exercise tolerance.\par \par Right now specific diagnosis is not known based on pathology results. If possible drug related pneumonitis is suspected or malignancy is still consideration, would recommend IR CT guided biopsy of one of the RLL lesions.\par \par IR done CT guided biopsy was discussed with patient, who agreed( she had Ct guided biopsy done at the initial diagnosis of BOOP in [facility]). \par Labs ordered, order for biopsy placed.\par OV after lung bx.\par \par \par

## 2023-07-21 NOTE — END OF VISIT
[] : Fellow [FreeTextEntry3] : \par I, personally performed the evaluation and management (E/M) services for this new patient.  That E/M includes conducting the initial examination, assessing all conditions, and establishing the plan of care.  Today, my ACP was here to observe my evaluation and management services for this patient to be followed going forward.\par \par

## 2023-07-21 NOTE — PHYSICAL EXAM
[Ambulatory and capable of all self care but unable to carry out any work activities] : Status 2- Ambulatory and capable of all self care but unable to carry out any work activities. Up and about more than 50% of waking hours [Normal] : affect appropriate [de-identified] : crackles at bases bilaterally [de-identified] : about 3cm palpable hard mass in right breast ~10 clock position 10cm from nipple, no palpable axillary nodes -- about 1cm mass in same area of palpable concern on exam 4/19/23; 5/31/23 no longer palpable , no lesions in right breast on exam, 6/14/23 - no palpable breast mass.7/21/23 no palpable breast mass. [de-identified] : right arm rash, hives

## 2023-07-21 NOTE — PROCEDURE
[FreeTextEntry1] : Patient walked 100m:\par  initial \par sat:96%\par HR:90\par ending\par sat:94%\par HR:140\par \par PFT: \par FEV1/FVC:78%\par FEV1:1.88L (77%)\par VC:2.4L(77%)\par TLC:3.18L (58%)\par DLCO:51%

## 2023-07-21 NOTE — REVIEW OF SYSTEMS
[Shortness Of Breath] : shortness of breath [SOB on Exertion] : shortness of breath during exertion [Diarrhea: Grade 0] : Diarrhea: Grade 0 [Negative] : Allergic/Immunologic [Skin Wound] : no skin wound

## 2023-07-21 NOTE — HISTORY OF PRESENT ILLNESS
[Never] : never [TextBox_4] : 58 yof with Tripple negative breast cancer, DM, HTN, HLD,  with ?hx of cystic lung disease and BOOP (15 years ago), Papillary thyroid ca s/p thyroidectomy referred to IP for abnormal CT scan performed at OSH with multiple peribronchovascular lung nodules now s/p robot assisted lung biopsy.\par \par \par Pathology shows:\par LUNG, RIGHT MIDDLE LOBE, FNA:\par NEGATIVE FOR MALIGNANT CELLS\par The specimen is hypocellular.  Examination reveals rare fragments of crushed lung parenchyma and some fibrous tissue.  Rare ciliated bronchial epithelial cells are also noted.  The cellblock is scant and shows similar findings.\par \par Lung right middle lobe forecept biopsy and slide\par Final Diagnosis\par 1.  Lung, right middle lobe, biopsy and touch prep:\par -  Crushed lung parenchyma with mild mixed inflammation, see note\par Note: Multiple levels of the specimen are examined.  Examination reveals crushed alveolar lung parenchyma.  There is a mild, mixed inflammatory infiltrate composed of neutrophils, lymphocytes, and rare plasma cells. Granulomatous inflammation is not identified.  There are no features of an organizing pneumonia or interstitial lung disease.  There is no evidence of neoplasia.  Overall these findings are nonspecific and do not account for a mass or lesion. \par \par No complaints since procedure, no dyspnea or hemoptysis. no family history of lung disease or auto-immune diseases. Was never incarcerated but was living in a homeless hotel when she was 19-born in [facility], never traveled. Diagnosed with BOOP 15 years ago-did get it treated at all. \par \par Current respiratory symptoms: says her exercise tolerance is good, able to walk 10 nyc blocks without stopping. she says her exercise tolerance decreased approx 2 weeks after she got a new treatment (adriamycin/cytoxin/pembro), now she is back at her baseline. Got her echo yesterday. No leg swelling, no orthopnea, has a dry cough that has developed since bronchoscopy.\par Had no respiratory symptoms in  2021-was incidentally covid positive.\par \par Patient reports being diagnosed with BOOP at [facility] in 2008. She reports having done many tests, but no treatment. She has been followed there for 10 years and reports she was not told that her CT changed resolved. She reports not being symptomatic. No images are available for a review from that time.\par In 2021 had chest CT which did not show any lung changes.

## 2023-07-21 NOTE — ASSESSMENT
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 \par \par Patient states she first palpated the right breast mass sometime in February 2022 which prompted her to seek medical care. Patient's PCP Dr. Radha Sarah sent patient for imaging which yielded a 2.1cm lobulated heterogeneous mass on sonogram 10:00 10FN. She has no prior hx of breast surgery or bx. Denies family history of breast or ovarian cancer. Patient denies skin changes or nipple discharge bilaterally. \par \par Reviewed clinical utility of neoadjuvant chemoimmunotherapy as per the Keynote 522 clinical trial - recommending starting weekly carbo/taxol x12 followed by adriamycin/cytoxan every 2 weeks with continued pembrolizumab every 6 weeks throughout duration of chemotherapy IV through chemoport with growth factor support (Neulasta onbody injector) after AC. \par Reviewed potential side effects of chemotherapy including but not limited to allergic reactions/infusion reactions, bone marrow suppression leading to fatigue and increased risk of infection and febrile neutropenia,anemia and thrombocytopenia needing supportive transfusions,hair loss, skin/nail changes, nausea/vomiting, stomatitis, abdominal pain, diarrhea/constipation, kidney and liver dysfunction with electrolyte disturbances, peripheral neuropathy, small risk of cardiomyopathy and secondary malignancies (ie hematologic malignancies due to chemotherapy). Also discussed potential side effects of immunotherapy (pembro) including inflammation of colon leading to colitis, inflammation of lining of lung or heart for example or other organs leading to pleuritis and pericarditis respectively, also other possible conditions such as hypophysitis or pancreatitis; reviewed treatment in such a case would involve stopping of the offending agent and initiating steroids. \par \par Re-reviewed above risks of treatment, patient had all her questions answered and expressed verbal understanding and agreement to proceed with treatment. She signed a consent form. \par \par \par 5/31/23 To proceed with week 10*(correction) of treatment (off carboplatin due to neutropenia with week 4 of treatment) to proceed with Taxol; Pembro was given on 3/29, 5/10 and will continue every 6 wks.\par Compazine 10mg PO Q6hrs as needed for nausea/vomiting. \par Cr increased today to 1.3 from 1.0 likely due to pembro administration on 5/10. To continue to monitor, encouraged patient to increase oral hydration.\par Will continue to monitor peripheral neuropathy affecting her hands, for now at grade 1. \par \par 06/07/23\par ANC at goal, grade 1 neuropathy present. \par Proceed with week 11 of Taxol. \par Reports left throat pain when swallowing. Possible mucositis. Will prescribe MMW. If no improvement, will refer to ENT. \par Will continue to monitor right arm pruritic rash, may be grade 1 IrAE. Recommend topical steroid. \par \par 6/14/23 Pt tearful today, states exhausted, neuropathy still grade 1 in hands, and 1to 2 in feet.\par Decided to hold Taxol today #12/12 today. \par To start AC #1 with growth factor, and Pembro on 6/21/23. \par On exam good response, no palpable breast mass. \par MMW prn for throat pain which patient found helpful. \par Dr Villa referral for anxiety.\par \par 6/21/23 To proceed with 1st AC treatment with Neulasta Onpro today and Pembro.\par Clinically with excellent response to treatment and good tolerance. \par AE - grade 1 peripheral neuropathy in hands/feet. \par Compazine prn nausea. \par \par 7/5/23 \par Pt with SOB on exertion after last treatment (AC and Pembro), no leg swelling. Symptoms present but much improved. CTA done in ER at [facility] negative for PE but with cavitary lesions which pt notes could be due to her previous diagnosis of BOOP.\par To check EKG, Echo, pulm evaluation with CT chest and bronchoscopy (discussed with Dr Melvina Campa). \par Holding treatment today. \par \par 7/21/23 Echo with decrease in EF to 55% down from 70%. \par s/p bronch with no evidence of malignancy and infection, but unclear if changes on CT chest are due to prior h/o BOOP vs drug induced pneumonitis - reviewed with patient in detail. \par Referred to Dr Ivy, cardiologist due to decrease in EF. \par Discussed case with Dr Kaity Bernal, whose team will set up MR breast. \par \par \par RTC in 3 weeks for follow up.

## 2023-07-21 NOTE — PHYSICAL EXAM
[No Acute Distress] : no acute distress [Normal Oropharynx] : normal oropharynx [II] : Mallampati Class: II [Normal Appearance] : normal appearance [No Neck Mass] : no neck mass [Normal Rate/Rhythm] : normal rate/rhythm [Normal S1, S2] : normal s1, s2 [No Murmurs] : no murmurs [No Abnormalities] : no abnormalities [Benign] : benign [No Clubbing] : no clubbing [No Edema] : no edema [Normal Color/ Pigmentation] : normal color/ pigmentation [Well Groomed] : well groomed [Supple] : supple [No Resp Distress] : no resp distress [No Cyanosis] : no cyanosis [No Rash] : no rash [No Focal Deficits] : no focal deficits [Oriented x3] : oriented x3 [TextBox_68] : bibasilar crackles, no wheezing [TextBox_99] : walking with walking stick

## 2023-07-21 NOTE — CONSULT LETTER
[Dear  ___] : Dear  [unfilled], [Courtesy Letter:] : I had the pleasure of seeing your patient, [unfilled], in my office today. [Please see my note below.] : Please see my note below. [Sincerely,] : Sincerely, [FreeTextEntry3] : Melvina Campa MD

## 2023-07-27 ENCOUNTER — APPOINTMENT (OUTPATIENT)
Dept: HEMATOLOGY ONCOLOGY | Facility: CLINIC | Age: 58
End: 2023-07-27
Payer: COMMERCIAL

## 2023-07-27 ENCOUNTER — NON-APPOINTMENT (OUTPATIENT)
Age: 58
End: 2023-07-27

## 2023-07-27 ENCOUNTER — APPOINTMENT (OUTPATIENT)
Dept: CARDIOLOGY | Facility: CLINIC | Age: 58
End: 2023-07-27
Payer: MEDICAID

## 2023-07-27 VITALS
BODY MASS INDEX: 35.03 KG/M2 | SYSTOLIC BLOOD PRESSURE: 162 MMHG | HEART RATE: 103 BPM | TEMPERATURE: 98.7 F | OXYGEN SATURATION: 98 % | DIASTOLIC BLOOD PRESSURE: 97 MMHG | WEIGHT: 223.19 LBS | HEIGHT: 67 IN

## 2023-07-27 DIAGNOSIS — F43.20 ADJUSTMENT DISORDER, UNSPECIFIED: ICD-10-CM

## 2023-07-27 PROCEDURE — 99205 OFFICE O/P NEW HI 60 MIN: CPT | Mod: 25

## 2023-07-27 PROCEDURE — 93000 ELECTROCARDIOGRAM COMPLETE: CPT

## 2023-07-27 PROCEDURE — 90791 PSYCH DIAGNOSTIC EVALUATION: CPT

## 2023-07-27 RX ORDER — ASPIRIN 81 MG
81 TABLET, DELAYED RELEASE (ENTERIC COATED) ORAL
Refills: 0 | Status: COMPLETED | COMMUNITY
End: 2023-07-27

## 2023-07-27 NOTE — PHYSICAL EXAM
[Normal Conjunctiva] : normal conjunctiva [No Xanthelasma] : no xanthelasma [Normal Venous Pressure] : normal venous pressure [No Carotid Bruit] : no carotid bruit [Normal S1, S2] : normal S1, S2 [No Murmur] : no murmur [No Rub] : no rub [No Gallop] : no gallop [Normal] : soft, non-tender, no masses/organomegaly, normal bowel sounds [Normal Gait] : normal gait [No Cyanosis] : no cyanosis [No Edema] : no edema [No Clubbing] : no clubbing [No Varicosities] : no varicosities [No Skin Lesions] : no skin lesions [Moves all extremities] : moves all extremities [No Focal Deficits] : no focal deficits [Normal Speech] : normal speech [de-identified] : dry creps at bases bilaterally [Alert and Oriented] : alert and oriented

## 2023-07-27 NOTE — REASON FOR VISIT
[FreeTextEntry3] : Dr. Cayla Jeffries [FreeTextEntry1] : HENRY PERDOMO is a 58 year old woman with a history of papillary thyroid cancer, diabetes type 2, hypertension, inflammatory lung disease (BOOP), now with triple negative right breast carcinoma who is seen for shortness of breath and decrease in ejection fraction.\par \par Prior Cancer Treatments:\par ------------------------------------------------------------------------\par Chemo/targeted therapy:\par 3/29/2023: Keynote 522 regimen: pembrolizumab q6 weeks + TC followed by AC\par ------------------------------------------------------------------------\par Surgery:\par 2017: thyroidectomy\par ------------------------------------------------------------------------\par Radiation:

## 2023-07-27 NOTE — HISTORY OF PRESENT ILLNESS
[FreeTextEntry1] : This 58 year old woman with no cardiac history, but h/o BOOP, began neoadjuvant treatment with chemo/immunotherapy (TC and pembrolizumab) on 3/29/23. She completed 12 weeks of TC and received one cycle of AC with 3rd dose of pembrolizumab on 6/21/23. A few days after this she developed acute shortness of breath and was admitted to [facility]. CT-angiogram was negative for PE, but there were cavitary lung lesions compatible with organizing pneumonia/pneumonitis. Hospital records not available to me, but she reports her breathing improved without intervention and she is near her baseline now. \par \par An echo done 7/19 (after discharge) showed a decrease in the LVEF to 55 % (from 70 %) with hypokinesis of the basal inferior wall. \par \par She never had edema, orthopnea, palpitations, vision changes, focal muscle weakness, or chest pain.\par \par Baseline ET is 10-15 blocks, was down to about 1 block at time of hospitalization, now can go about the same as baseline, but has to push herself harder. She reports having a stress test several years ago which was negative. She has no history of prior coronary interventions or clinical cardiovascular disease.\par \par Patient reports being diagnosed with BOOP at [facility] in 2008 by biopsy. She followed there for several years but received no specific treatment. A chest CT done in 2021 showed no inflammatory lung disease.\par \tresa Lives [address] in [place] with her daughter.\par \par \par Cardiovascular Summary:\par ----------------------------------------------\par ECG:\par 7/27/2023: sinus with LVH\par ----------------------------------------------\par Echo:\par 7/19/2023: EF 55%, basal/mid inferior hypokinesis, GLS - 21.9 (suboptmal quality), no effusion\par 3/27/2023: EF 70 %, GLS -22.4\par ----------------------------------------------

## 2023-07-27 NOTE — ASSESSMENT
[FreeTextEntry1] : 58 year old woman with CAD risk factors and new decrease in LVEF with segmental hypokinesis during neoadjuvant chemo/immunotherapy including anthracycline and PD-1 inhibitor. Acute shortness of breath may be related to organizing pneumonia from reactivation of prior inflammatory lung disease in the setting of immunotherapy, but is unlikely related to heart failure. I do not think the symptoms can be explained by the change in LVEF, as they improved.\par \par However, LVEF decline may be related to anthracycline, or less likely, immunotherapy toxicity. I doubt myocarditis, but a lower severity cardiac IRAE cannot be excluded.\par \par She is in the window for IRAE related to checkpoint inhibitors (s/p 3 cycles), so an ICI side effect (pneumonitis) is in the forefront of possibilities.\par \par She has significant risk factors for CAD (diabetes, uncontrolled HTN, malignant neoplasm) and possible segmental impairment of LV function on the echo, which may be seen in obstructive CAD.\par \par Recommendations:\par 1. Cardiac MRI and/or coronary CT-angiogram to rule out obstructive CAD/ischemic cardiomyopathy (HR is elevated, which may impede CT assessment).\par \par 2. Start carvedilol 6.25 BID now for cardioprotection from anthracycline, HTN, and HR\par \par 3. Will check troponin I, troponin T, CK, pro-BNP to screen for myocarditis, though doubtful.\par \par 4. Continue Lisinopril, Jardiance 25 (and other diabetes agents) , rosuvastatin 40.\par \par 5. Close follow up in one month\par \par She can continue Adriamycin, but would do so with carvedilol on board, and an echo should be repeated soon after the next cycle.\par \par Above recommendations were discussed with the patient and all questions answered to the best of my ability and to her apparent satisfaction.\par

## 2023-07-28 LAB
ALBUMIN SERPL ELPH-MCNC: 4.5 G/DL
ALP BLD-CCNC: 68 U/L
ALT SERPL-CCNC: 24 U/L
ANION GAP SERPL CALC-SCNC: 14 MMOL/L
AST SERPL-CCNC: 29 U/L
BILIRUB SERPL-MCNC: 0.2 MG/DL
BUN SERPL-MCNC: 17 MG/DL
CALCIUM SERPL-MCNC: 9.9 MG/DL
CHLORIDE SERPL-SCNC: 104 MMOL/L
CK SERPL-CCNC: 195 U/L
CO2 SERPL-SCNC: 24 MMOL/L
CREAT SERPL-MCNC: 1.06 MG/DL
EGFR: 62 ML/MIN/1.73M2
GLUCOSE SERPL-MCNC: 115 MG/DL
NT-PROBNP SERPL-MCNC: <36 PG/ML
POTASSIUM SERPL-SCNC: 4.7 MMOL/L
PROT SERPL-MCNC: 8 G/DL
SODIUM SERPL-SCNC: 141 MMOL/L
TROPONIN I SERPL-MCNC: <0.01 NG/ML
TROPONIN-T, HIGH SENSITIVITY: 11 NG/L

## 2023-07-31 ENCOUNTER — APPOINTMENT (OUTPATIENT)
Dept: BREAST CENTER | Facility: CLINIC | Age: 58
End: 2023-07-31
Payer: MEDICAID

## 2023-07-31 VITALS
HEIGHT: 67 IN | BODY MASS INDEX: 36.57 KG/M2 | WEIGHT: 233 LBS | HEART RATE: 97 BPM | DIASTOLIC BLOOD PRESSURE: 75 MMHG | SYSTOLIC BLOOD PRESSURE: 156 MMHG

## 2023-07-31 DIAGNOSIS — R92.8 OTHER ABNORMAL AND INCONCLUSIVE FINDINGS ON DIAGNOSTIC IMAGING OF BREAST: ICD-10-CM

## 2023-07-31 PROCEDURE — 99215 OFFICE O/P EST HI 40 MIN: CPT

## 2023-07-31 NOTE — HISTORY OF PRESENT ILLNESS
[FreeTextEntry1] : Patient is a 59 yo F who presents today for surgical discussion. Patient is s/p neoadjuvant chemotherapy w/ Dr. 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 She completed TC therapy 6/14/23 and received 1 cycle of AC w/ 3rd for of pembro 6/21/23 but could not complete any additional doses due to BOOP vs drug induced pneumonitis and decreased EF. She has known R 2.3cm mass 10:00 10FN bx carcinoma (2.8cm on MRI and 3cm on exam) 2/2023. Denies family history of breast or ovarian cancer. She is BRCA/9 panel negative (tested 2023). Patient denies skin changes or nipple discharge bilaterally. Of note, patient has hx of papillary thyroid cancer treated w/ total thyroidectomy 3/2017.  5/23/22: B/l MG (LHR)- heterogeneously dense. ROCKY. BI-RADS 2 2/7/23: R MG & B/l US (R palpable mass)- heterogenously dense. R 2.6 cm mass w/ heterogeneous calcs UOQ (palpable). US- R 2.1cm lobulated heterogeneous mass w/ calcs 10:00 10FN (palpable- rec US bx). L 0.6cm cyst 5:00 6FN. BI-RADS 4 2/27/23: R US bx 2.3cm mass 10:00 10FN (heart clip)- IDC (poorly differentiated), ER- (0%), OR- (0%), HER2- (0). Concordant- recommend definitive surgical and oncological management. 3/23/23: MRI- R 2.8cm enhancing mass w/ central necrosis c/w known malignancy UOQ. B/l LN appear morphologically normal. No evidence of multifocal, multicentric, or contralateral disease. BI-RADS 6 3/23/23: L MG- heterogenously dense. L stable punctate microcalcs. BI-RADS 2  7/31/23: B/l MG & US- heterogeneously dense. R focal asymmetry w/ tissue marker bx proven malignancy 14FN. L indeterminant amorphous calcs (rec stereo bx). US- R 2.5 cm irregular shaped mass bx proven malignancy w/ measurements similar to prior study and overall volume significant decrease 10:00 10FN. BI-RADS 4 7/31/23: MRI- R mass no longer visualized site of bx proven malignant. R 0.7cm intramammary LN outer central 9FN. Rec stereo bx of L calcs. BI-RADS 4

## 2023-07-31 NOTE — PHYSICAL EXAM
[Normocephalic] : normocephalic [EOMI] : extra ocular movement intact [Supple] : supple [No Supraclavicular Adenopathy] : no supraclavicular adenopathy [No Cervical Adenopathy] : no cervical adenopathy [de-identified] : R malignancy no longer palpable 10:00 [de-identified] : L breast/axilla/supraclavicular area: No masses, discharge, or adenopathy\par

## 2023-07-31 NOTE — PAST MEDICAL HISTORY
[Surgical Menopause] : The patient is in surgical menopause [Menarche Age ____] : age at menarche was [unfilled] [Menopause Age____] : age at menopause was [unfilled] [Total Preg ___] : G[unfilled] [Live Births ___] : P[unfilled]  [Age At Live Birth ___] : Age at live birth: [unfilled] [History of Hormone Replacement Treatment] : has no history of hormone replacement treatment [de-identified] : partial hyserectomy  [FreeTextEntry6] : no [FreeTextEntry7] : no [FreeTextEntry8] : no

## 2023-08-02 ENCOUNTER — APPOINTMENT (OUTPATIENT)
Dept: INFUSION THERAPY | Facility: CLINIC | Age: 58
End: 2023-08-02

## 2023-08-02 ENCOUNTER — RESULT REVIEW (OUTPATIENT)
Age: 58
End: 2023-08-02

## 2023-08-02 ENCOUNTER — APPOINTMENT (OUTPATIENT)
Dept: INTERVENTIONAL RADIOLOGY/VASCULAR | Facility: HOSPITAL | Age: 58
End: 2023-08-02
Payer: MEDICAID

## 2023-08-02 ENCOUNTER — APPOINTMENT (OUTPATIENT)
Dept: CT IMAGING | Facility: HOSPITAL | Age: 58
End: 2023-08-02
Payer: MEDICAID

## 2023-08-02 ENCOUNTER — OUTPATIENT (OUTPATIENT)
Dept: OUTPATIENT SERVICES | Facility: HOSPITAL | Age: 58
LOS: 1 days | End: 2023-08-02
Payer: MEDICAID

## 2023-08-02 DIAGNOSIS — Z90.710 ACQUIRED ABSENCE OF BOTH CERVIX AND UTERUS: Chronic | ICD-10-CM

## 2023-08-02 DIAGNOSIS — Z98.891 HISTORY OF UTERINE SCAR FROM PREVIOUS SURGERY: Chronic | ICD-10-CM

## 2023-08-02 DIAGNOSIS — Z98.890 OTHER SPECIFIED POSTPROCEDURAL STATES: Chronic | ICD-10-CM

## 2023-08-02 LAB
GLUCOSE BLDC GLUCOMTR-MCNC: 152 MG/DL — HIGH (ref 70–99)
GRAM STN FLD: SIGNIFICANT CHANGE UP
SPECIMEN SOURCE: SIGNIFICANT CHANGE UP

## 2023-08-02 PROCEDURE — 88305 TISSUE EXAM BY PATHOLOGIST: CPT

## 2023-08-02 PROCEDURE — 87075 CULTR BACTERIA EXCEPT BLOOD: CPT

## 2023-08-02 PROCEDURE — 71045 X-RAY EXAM CHEST 1 VIEW: CPT

## 2023-08-02 PROCEDURE — 32408 CORE NDL BX LNG/MED PERQ: CPT

## 2023-08-02 PROCEDURE — 88342 IMHCHEM/IMCYTCHM 1ST ANTB: CPT | Mod: 26,59

## 2023-08-02 PROCEDURE — 88173 CYTOPATH EVAL FNA REPORT: CPT | Mod: 26

## 2023-08-02 PROCEDURE — C1769: CPT

## 2023-08-02 PROCEDURE — 88341 IMHCHEM/IMCYTCHM EA ADD ANTB: CPT | Mod: 26

## 2023-08-02 PROCEDURE — 82962 GLUCOSE BLOOD TEST: CPT

## 2023-08-02 PROCEDURE — 88305 TISSUE EXAM BY PATHOLOGIST: CPT | Mod: 26

## 2023-08-02 PROCEDURE — 87102 FUNGUS ISOLATION CULTURE: CPT

## 2023-08-02 PROCEDURE — 87206 SMEAR FLUORESCENT/ACID STAI: CPT

## 2023-08-02 PROCEDURE — 87070 CULTURE OTHR SPECIMN AEROBIC: CPT

## 2023-08-02 PROCEDURE — 88173 CYTOPATH EVAL FNA REPORT: CPT

## 2023-08-02 PROCEDURE — 71045 X-RAY EXAM CHEST 1 VIEW: CPT | Mod: 26

## 2023-08-02 PROCEDURE — 87015 SPECIMEN INFECT AGNT CONCNTJ: CPT

## 2023-08-02 PROCEDURE — 88341 IMHCHEM/IMCYTCHM EA ADD ANTB: CPT | Mod: 26,59

## 2023-08-02 PROCEDURE — 88341 IMHCHEM/IMCYTCHM EA ADD ANTB: CPT

## 2023-08-02 PROCEDURE — 87116 MYCOBACTERIA CULTURE: CPT

## 2023-08-02 PROCEDURE — 88342 IMHCHEM/IMCYTCHM 1ST ANTB: CPT | Mod: 26

## 2023-08-03 LAB
NIGHT BLUE STAIN TISS: SIGNIFICANT CHANGE UP
NON-GYNECOLOGICAL CYTOLOGY STUDY: SIGNIFICANT CHANGE UP
SPECIMEN SOURCE: SIGNIFICANT CHANGE UP

## 2023-08-07 LAB
CULTURE RESULTS: NO GROWTH — SIGNIFICANT CHANGE UP
SPECIMEN SOURCE: SIGNIFICANT CHANGE UP

## 2023-08-09 ENCOUNTER — NON-APPOINTMENT (OUTPATIENT)
Age: 58
End: 2023-08-09

## 2023-08-11 ENCOUNTER — APPOINTMENT (OUTPATIENT)
Dept: HEMATOLOGY ONCOLOGY | Facility: CLINIC | Age: 58
End: 2023-08-11
Payer: MEDICAID

## 2023-08-11 VITALS
SYSTOLIC BLOOD PRESSURE: 143 MMHG | HEART RATE: 97 BPM | BODY MASS INDEX: 34.84 KG/M2 | WEIGHT: 222 LBS | HEIGHT: 67 IN | RESPIRATION RATE: 18 BRPM | TEMPERATURE: 98.3 F | DIASTOLIC BLOOD PRESSURE: 89 MMHG | OXYGEN SATURATION: 98 %

## 2023-08-11 PROCEDURE — 99214 OFFICE O/P EST MOD 30 MIN: CPT

## 2023-08-11 NOTE — ASSESSMENT
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   cT2 cN0 Stage IIB  Reviewed clinical utility of neoadjuvant chemoimmunotherapy as per the Keynote 522 clinical trial - recommending starting weekly carbo/taxol x12 followed by adriamycin/cytoxan every 2 weeks with continued pembrolizumab every 6 weeks throughout duration of chemotherapy IV through chemoport with growth factor support (Neulasta onbody injector) after AC.  Reviewed potential side effects of chemotherapy including but not limited to allergic reactions/infusion reactions, bone marrow suppression leading to fatigue and increased risk of infection and febrile neutropenia,anemia and thrombocytopenia needing supportive transfusions,hair loss, skin/nail changes, nausea/vomiting, stomatitis, abdominal pain, diarrhea/constipation, kidney and liver dysfunction with electrolyte disturbances, peripheral neuropathy, small risk of cardiomyopathy and secondary malignancies (ie hematologic malignancies due to chemotherapy). Also discussed potential side effects of immunotherapy (pembro) including inflammation of colon leading to colitis, inflammation of lining of lung or heart for example or other organs leading to pleuritis and pericarditis respectively, also other possible conditions such as hypophysitis or pancreatitis; reviewed treatment in such a case would involve stopping of the offending agent and initiating steroids.   Re-reviewed above risks of treatment, patient had all her questions answered and expressed verbal understanding and agreement to proceed with treatment. She signed a consent form.    5/31/23 To proceed with week 10*(correction) of treatment (off carboplatin due to neutropenia with week 4 of treatment) to proceed with Taxol; Pembro was given on 3/29, 5/10 and will continue every 6 wks. Compazine 10mg PO Q6hrs as needed for nausea/vomiting.  Cr increased today to 1.3 from 1.0 likely due to pembro administration on 5/10. To continue to monitor, encouraged patient to increase oral hydration. Will continue to monitor peripheral neuropathy affecting her hands, for now at grade 1.   06/07/23 ANC at goal, grade 1 neuropathy present.  Proceed with week 11 of Taxol.  Reports left throat pain when swallowing. Possible mucositis. Will prescribe MMW. If no improvement, will refer to ENT.  Will continue to monitor right arm pruritic rash, may be grade 1 IrAE. Recommend topical steroid.   6/14/23 Pt tearful today, states exhausted, neuropathy still grade 1 in hands, and 1to 2 in feet. Decided to hold Taxol today #12/12 today.  To start AC #1 with growth factor, and Pembro on 6/21/23.  On exam good response, no palpable breast mass.  MMW prn for throat pain which patient found helpful.  Dr Villa referral for anxiety.  6/21/23 To proceed with 1st AC treatment with Neulasta Onpro today and Pembro. Clinically with excellent response to treatment and good tolerance.  AE - grade 1 peripheral neuropathy in hands/feet.  Compazine prn nausea.   7/5/23  Pt with SOB on exertion after last treatment (AC and Pembro), no leg swelling. Symptoms present but much improved. CTA done in ER at [facility] negative for PE but with cavitary lesions which pt notes could be due to her previous diagnosis of BOOP. To check EKG, Echo, pulm evaluation with CT chest and bronchoscopy (discussed with Dr Melvina Campa).  Holding treatment today.   7/21/23 Echo with decrease in EF to 55% down from 70%.  s/p bronch with no evidence of malignancy and infection, but unclear if changes on CT chest are due to prior h/o BOOP vs drug induced pneumonitis - reviewed with patient in detail.  Referred to Dr Ivy, cardiologist due to decrease in EF.  Discussed case with Dr Kaity Bernal, whose team will set up  breast.   8/11/23 Here for f/u after seeing cardiologist and pulmonologist, s/p lung bx c/w BOOP vs reaction to Keytruda.  Dr Medina stated further adriamycin treatment can be given with carvedilol and Echo after treatment.  Given excellent response to chemotherapy on MR breast with CR on imaging) and possibility of drug reaction (ie to Cytoxa or Keytruda) with previous drop in EF, favor holding therapy for now and reassessing need for adjuvant treatment after breast surgery. Reviewed with patient that based on surgical pathology she may need additional immunotherapy +/- xeloda in case of no pCR; reviewed that the decision to resume Keytruda will be made with input from pulmonary. Patient to obtain cardiac, pulmonary clearance and letter from PMD prior to breast surgery.  RTC in 6 weeks for follow up.

## 2023-08-11 NOTE — HISTORY OF PRESENT ILLNESS
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   Here today for f/u.  Patient states she first palpated the right breast mass sometime in 2022 which prompted her to seek medical care. Patient's PCP Dr. Radha Sarah sent patient for imaging which yielded a 2.1cm lobulated heterogeneous mass on sonogram 10:00 10FN. She has no prior hx of breast surgery or bx. Denies family history of breast or ovarian cancer. Patient denies skin changes or nipple discharge bilaterally.   22: B/l MG (LHR)- heterogeneously dense. ROCKY. BI-RADS 2 23: R MG & B/l US (R palpable mass)- heterogenously dense. R 2.6 cm mass w/ heterogeneous calcs UOQ (palpable). US- R 2.1cm lobulated heterogeneous mass w/ calcs 10:00 10FN (palpable- rec US bx). L 0.6cm cyst 5:00 6FN. BI-RADS 4 23: R US bx 2.3cm mass 10:00 10FN (heart clip)- IDC (poorly differentiated), ER- (0%), KS- (0%), HER2- (0). Concordant- recommend definitive surgical and oncological management.  3/23/23: MRI- R 2.8cm enhancing mass w/ central necrosis c/w known malignancy UOQ. B/l LN appear morphologically normal. No evidence of multifocal, multicentric, or contralateral disease. BI-RADS 6 3/23/23: L MG- heterogenously dense. L stable punctate microcalcs. BI-RADS 2 23 PFT: Normal FEC1/FVC ratio, no significant change with bronchodilator via spacer. Lung capacity is moderately-severely decreased. Diffusion capacity is moderately decreased. 23: B/l MG & US- heterogeneously dense. R focal asymmetry w/ tissue marker bx proven malignancy 14FN. L indeterminant amorphous calcs (rec stereo bx). US- R 2.5 cm irregular shaped mass bx proven malignancy w/ measurements similar to prior study and overall volume significant decrease 10:00 10FN. BI-RADS 4 23 MRI breast: Good treatment response, with no residual mass in the right upper outer breast. Mass previously measured 2.8 x 2.5 x 2.8 cm.  Patient has known amorphous calcifications in the left central breast for which biopsy was recommended earlier same day. 23 LLL lung biopsy: Lung parenchyma with lympho-plasmacytic aggregates, edematous Boop-like foci, rare poorly formed microgranulomas, and diffuse reactive/ metaplastic epithelial changes. No increased eosinophils visualized. No malignancy visualized. AFB and GMS stain for fungus negative. 23: L stereo bx x 2- SITE 1) L lateral calcs (cork clip)- benign breast tissue w/ mild fibrocystic changes and calcs, SITE 2) L central calcs (hourglass clip)- benign breast tissue w/ fibrocystic changes associated w/ calcs. CONCORDANCE PENDING.  OBhx: Menarche 17yo LMP s/p partial hysterectomy at age 45yo  did not breastfeed, age at first birth 18yo.  Denies h/o fertility treatments, denies h/o HRT and OCP [de-identified] : Patient states the previous shortness of breath resolved and she feels at her baseline.   s/p Echo on 7/19 with decrease in EF down to 55% from 70%.   At last visit, patient with shortness of breath,for which she was seen at the [facility] ER with CTA ruling out PE but showing cavitary lesions. 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 States her shortness of breath is present on exertion, improved significantly compared to after the last treatment but still present.  LLL lung biopsy negative for malignancy.  L breast lateral calcifications were biopsied and benign.  She saw card-onc Carol Kraft on 7/27/23, who ordered labwork, cardiac MR or CTA.  She was started on carvedilol.  He stated she could continue with Adriamycin with carvedilol on board with repeat ECHO after cycle.  Dr. Kaity Bernal is planning right lumpectomy and SLNB pending medical clearance.

## 2023-08-11 NOTE — PHYSICAL EXAM
[Ambulatory and capable of all self care but unable to carry out any work activities] : Status 2- Ambulatory and capable of all self care but unable to carry out any work activities. Up and about more than 50% of waking hours [Normal] : affect appropriate [de-identified] : clear [de-identified] : about 3cm palpable hard mass in right breast ~10 clock position 10cm from nipple, no palpable axillary nodes -- about 1cm mass in same area of palpable concern on exam 4/19/23; 5/31/23 no longer palpable , no lesions in right breast on exam, 6/14/23 - no palpable breast mass.7/21/23-8/11/23 no palpable breast mass. [de-identified] : right arm rash, hives

## 2023-08-12 LAB
CULTURE RESULTS: SIGNIFICANT CHANGE UP
CULTURE RESULTS: SIGNIFICANT CHANGE UP
SPECIMEN SOURCE: SIGNIFICANT CHANGE UP
SPECIMEN SOURCE: SIGNIFICANT CHANGE UP

## 2023-08-15 ENCOUNTER — APPOINTMENT (OUTPATIENT)
Dept: PULMONOLOGY | Facility: CLINIC | Age: 58
End: 2023-08-15
Payer: MEDICAID

## 2023-08-15 PROCEDURE — 94060 EVALUATION OF WHEEZING: CPT

## 2023-08-15 PROCEDURE — 94727 GAS DIL/WSHOT DETER LNG VOL: CPT

## 2023-08-15 PROCEDURE — 94729 DIFFUSING CAPACITY: CPT

## 2023-08-16 ENCOUNTER — APPOINTMENT (OUTPATIENT)
Dept: INFUSION THERAPY | Facility: CLINIC | Age: 58
End: 2023-08-16

## 2023-08-17 ENCOUNTER — APPOINTMENT (OUTPATIENT)
Dept: PULMONOLOGY | Facility: CLINIC | Age: 58
End: 2023-08-17
Payer: MEDICAID

## 2023-08-17 VITALS
RESPIRATION RATE: 12 BRPM | TEMPERATURE: 98.1 F | DIASTOLIC BLOOD PRESSURE: 82 MMHG | OXYGEN SATURATION: 97 % | WEIGHT: 223 LBS | SYSTOLIC BLOOD PRESSURE: 127 MMHG | BODY MASS INDEX: 35 KG/M2 | HEART RATE: 117 BPM | HEIGHT: 67 IN

## 2023-08-17 PROCEDURE — 99213 OFFICE O/P EST LOW 20 MIN: CPT

## 2023-08-17 NOTE — REVIEW OF SYSTEMS
[Fever] : no fever [Fatigue] : no fatigue [Recent Wt Gain (___ Lbs)] : ~T no recent weight gain [Poor Appetite] : no poor appetite [Recent Wt Loss (___ Lbs)] : ~T no recent weight loss [Cough] : no cough [Chest Tightness] : no chest tightness [Sputum] : no sputum [Dyspnea] : no dyspnea [Pleuritic Pain] : no pleuritic pain [Wheezing] : no wheezing [SOB on Exertion] : sob on exertion [Immunocompromised] : immunocompromised [Negative] : Psychiatric

## 2023-08-17 NOTE — PROCEDURE
[FreeTextEntry1] : 200m staring 95%  post exercise: 93% , subjectively dyspneic. Saturation recovered quickly to 95 with stopping exertion.

## 2023-08-17 NOTE — CONSULT LETTER
[Dear  ___] : Dear  [unfilled], [Consult Letter:] : I had the pleasure of evaluating your patient, [unfilled]. [( Thank you for referring [unfilled] for consultation for _____ )] : Thank you for referring [unfilled] for consultation for [unfilled] [Please see my note below.] : Please see my note below. [Consult Closing:] : Thank you very much for allowing me to participate in the care of this patient.  If you have any questions, please do not hesitate to contact me. [Sincerely,] : Sincerely, [FreeTextEntry3] : Melvina Campa MD

## 2023-08-17 NOTE — PHYSICAL EXAM
[No Acute Distress] : no acute distress [Well Groomed] : well groomed [Normal Oropharynx] : normal oropharynx [Normal Appearance] : normal appearance [Supple] : supple [No Neck Mass] : no neck mass [Normal Rate/Rhythm] : normal rate/rhythm [Normal S1, S2] : normal s1, s2 [No Resp Distress] : no resp distress [No Abnormalities] : no abnormalities [Tenderness: ___] : tenderness: [unfilled] [Benign] : benign [Soft] : soft [Normal Gait] : normal gait [No Clubbing] : no clubbing [No Cyanosis] : no cyanosis [No Edema] : no edema [Normal Color/ Pigmentation] : normal color/ pigmentation [No Focal Deficits] : no focal deficits [Oriented x3] : oriented x3 [TextBox_44] : no cervical or supraclavicular lad,  [TextBox_68] : mild crackles at the bases bl. [TextBox_80] : no palpable axillary lad

## 2023-08-17 NOTE — ASSESSMENT
[FreeTextEntry1] : 58 yof with Tripple negative breast cancer, DM, HTN, HLD,  with ?hx of cystic lung disease and BOOP (15 years ago), Papillary thyroid ca s/p thyroidectomy referred to IP for abnormal CT scan performed at OSH with multiple peribronchovascular lung nodules now s/p robot assisted lung biopsy.  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  It is not clear if these changes are related to pembro lung injury or they were triggered by initiation of pembro as previous chest CT, before drug initiation, was clear. Special stains (CD4, 8, 57) are still pending, PDL1 stain does not support theory of pembro related lung injury. Repeat PFT show improved lug volumes. Treatment with steroids for BOOP is a consideration, but because her volumes are much improved, this can be followed and recheck after surgery. Delay in steroid treatment may also help with postsurgical healing. She walked today in the office without desaturation. Patient is medically optimized for procedure under [facility].

## 2023-08-17 NOTE — HISTORY OF PRESENT ILLNESS
[Never] : never [TextBox_4] : 58 yof with Tripple negative breast cancer, DM, HTN, HLD,  with ?hx of cystic lung disease and BOOP (15 years ago), Papillary thyroid ca s/p thyroidectomy referred to IP for abnormal CT scan performed at OSH with multiple peribronchovascular lung nodules now s/p robot assisted lung biopsy.   Pathology shows: LUNG, RIGHT MIDDLE LOBE, FNA: NEGATIVE FOR MALIGNANT CELLS The specimen is hypocellular.  Examination reveals rare fragments of crushed lung parenchyma and some fibrous tissue.  Rare ciliated bronchial epithelial cells are also noted.  The cellblock is scant and shows similar findings.  Lung right middle lobe forecept biopsy and slide Final Diagnosis 1.  Lung, right middle lobe, biopsy and touch prep: -  Crushed lung parenchyma with mild mixed inflammation, see note Note: Multiple levels of the specimen are examined.  Examination reveals crushed alveolar lung parenchyma.  There is a mild, mixed inflammatory infiltrate composed of neutrophils, lymphocytes, and rare plasma cells. Granulomatous inflammation is not identified.  There are no features of an organizing pneumonia or interstitial lung disease.  There is no evidence of neoplasia.  Overall these findings are nonspecific and do not account for a mass or lesion.   No complaints since procedure, no dyspnea or hemoptysis. no family history of lung disease or auto-immune diseases. Was never incarcerated but was living in a homeless hotel when she was 19-born in [facility], never traveled. Diagnosed with BOOP 15 years ago-did get it treated at all.   Current respiratory symptoms: says her exercise tolerance is good, able to walk 10 nyc blocks without stopping. she says her exercise tolerance decreased approx 2 weeks after she got a new treatment (adriamycin/cytoxin/pembro), now she is back at her baseline. Got her echo yesterday. No leg swelling, no orthopnea, has a dry cough that has developed since bronchoscopy. Had no respiratory symptoms in  2021-was incidentally covid positive.  Patient reports [place] [facility] [place] She reports having done many tests, but no treatment. She has been followed there for 10 years and reports she was not told that her CT changed resolved. She reports not being symptomatic. No images are available for a review from that time. In 2021 had chest CT which did not show any lung changes.  8/17/2023: Patient underwent repeat biopsy( TTNA): Pathology revealed:, left lower lobe, core biopsy :Lung parenchyma with lympho-plasmacytic aggregates, edematous Boop-like foci, rare poorly formed microgranulomas, and diffuse reactive/metaplastic epithelial changes.  No increased eosinophils visualized.  No malignancy visualized (see note)  Tumor Proportion Score: <1% / Negative  CD4, CD8, CD57 is still pending.  Patient reports feeling well, able to walk10 block, but when moving up to the hill she has some dyspnea. She was however able to walk fast up to the hill one block today. She denies nocturnal symptoms. repeat PFT: 8/15/23: FEV1:1.99L (81% FVC: 2.47L(80%) FEV1/FVC:80 TLCL3.48L(64%) VC:2.47L(80%) DLCO:53%  This appears to be improved since last PFT done in July 2023.

## 2023-08-21 ENCOUNTER — APPOINTMENT (OUTPATIENT)
Dept: BREAST CENTER | Facility: CLINIC | Age: 58
End: 2023-08-21

## 2023-08-24 ENCOUNTER — APPOINTMENT (OUTPATIENT)
Dept: CARDIOLOGY | Facility: CLINIC | Age: 58
End: 2023-08-24
Payer: MEDICAID

## 2023-08-24 ENCOUNTER — APPOINTMENT (OUTPATIENT)
Dept: MRI IMAGING | Facility: HOSPITAL | Age: 58
End: 2023-08-24

## 2023-08-24 VITALS
WEIGHT: 221 LBS | HEART RATE: 100 BPM | HEIGHT: 67 IN | DIASTOLIC BLOOD PRESSURE: 80 MMHG | SYSTOLIC BLOOD PRESSURE: 145 MMHG | OXYGEN SATURATION: 100 % | BODY MASS INDEX: 34.69 KG/M2 | TEMPERATURE: 98.6 F

## 2023-08-24 DIAGNOSIS — Z87.898 PERSONAL HISTORY OF OTHER SPECIFIED CONDITIONS: ICD-10-CM

## 2023-08-24 DIAGNOSIS — R22.1 LOCALIZED SWELLING, MASS AND LUMP, NECK: ICD-10-CM

## 2023-08-24 DIAGNOSIS — K21.9 ACUTE LARYNGITIS: ICD-10-CM

## 2023-08-24 DIAGNOSIS — J04.0 ACUTE LARYNGITIS: ICD-10-CM

## 2023-08-24 DIAGNOSIS — Z12.39 ENCOUNTER FOR OTHER SCREENING FOR MALIGNANT NEOPLASM OF BREAST: ICD-10-CM

## 2023-08-24 DIAGNOSIS — R93.1 ABNORMAL FINDINGS ON DIAGNOSTIC IMAGING OF HEART AND CORONARY CIRCULATION: ICD-10-CM

## 2023-08-24 DIAGNOSIS — I10 ESSENTIAL (PRIMARY) HYPERTENSION: ICD-10-CM

## 2023-08-24 DIAGNOSIS — D49.7 NEOPLASM OF UNSPECIFIED BEHAVIOR OF ENDOCRINE GLANDS AND OTHER PARTS OF NERVOUS SYSTEM: ICD-10-CM

## 2023-08-24 DIAGNOSIS — M75.122 COMPLETE ROTATOR CUFF TEAR OR RUPTURE OF LEFT SHOULDER, NOT SPECIFIED AS TRAUMATIC: ICD-10-CM

## 2023-08-24 PROCEDURE — 99214 OFFICE O/P EST MOD 30 MIN: CPT

## 2023-08-24 RX ORDER — CARVEDILOL 12.5 MG/1
12.5 TABLET, FILM COATED ORAL TWICE DAILY
Qty: 180 | Refills: 3 | Status: ACTIVE | COMMUNITY
Start: 2023-07-27 | End: 1900-01-01

## 2023-08-24 NOTE — REVIEW OF SYSTEMS
[Dyspnea on exertion] : dyspnea during exertion [Rash] : rash [Under Stress] : under stress [Negative] : Integumentary

## 2023-08-30 NOTE — ASSESSMENT
[FreeTextEntry1] : ---------------------------------------- 58 year old woman with CAD risk factors who had decrease in LVEF with segmental hypokinesis during neoadjuvant chemo/immunotherapy including anthracycline and PD-1 inhibitor. Acute shortness of breath may be related to organizing pneumonia from reactivation of prior inflammatory lung disease in the setting of immunotherapy but is unlikely related to heart failure. I do not think the symptoms can be explained by the change in LVEF, and they improved significantly following the acute episode.    However, LVEF decline may be related to anthracycline, or less likely, immunotherapy toxicity. I doubt myocarditis, but a lower severity cardiac IRAE cannot be excluded. She is in the window for IRAE related to checkpoint inhibitors (s/p 3 cycles), but fortunately troponin was normal. Pro-BNP was < 36 pg/mL.  She has several risk factors for CAD (diabetes, uncontrolled HTN, malignant neoplasm) and segmental impairment of LV function on the echo, which may signify ischemia. HR remains higher than optimal for coronary CT angiogram. So I again recommended she schedule the cardiac MRI to re-evaluate LV function.  Recommendations: 1. Cardiac MRI to evaluate new cardiomyopathy 2. Increase carvedilol to 12.5 BID now for cardio protection, HTN, and HR 3. Continue Lisinopril, Jardiance 25 (and other diabetes agents) and rosuvastatin 40  # Pre-operative risk assessment and recommendations for lumpectomy and lymph node dissection: - Intermediate risk patient undergoing an intermediate risk procedure. Optimized to proceed. - since the patient is no longer symptomatic and able to complete > 4 METS, cardiac MRI can be deferred until after surgery - continue carvedilol iggy-operatively including AM of procedure with a small sip of water - hold Jardiance for 3 days prior to procedure or as per local institutional policy - VTE prophylaxis as per surgery  Follow up in one month with me after recovery and prior to neoadjuvant immunotherapy.   Above recommendations were discussed with the patient and all questions answered to the best of my ability and to her apparent satisfaction.

## 2023-08-30 NOTE — PHYSICAL EXAM
[Normal Conjunctiva] : normal conjunctiva [No Xanthelasma] : no xanthelasma [Normal Venous Pressure] : normal venous pressure [No Carotid Bruit] : no carotid bruit [Normal S1, S2] : normal S1, S2 [No Murmur] : no murmur [No Rub] : no rub [No Gallop] : no gallop [Normal] : soft, non-tender, no masses/organomegaly, normal bowel sounds [Normal Gait] : normal gait [No Edema] : no edema [No Cyanosis] : no cyanosis [No Clubbing] : no clubbing [No Varicosities] : no varicosities [No Skin Lesions] : no skin lesions [Moves all extremities] : moves all extremities [No Focal Deficits] : no focal deficits [Normal Speech] : normal speech [Alert and Oriented] : alert and oriented [de-identified] : dry creps at bases bilaterally

## 2023-08-30 NOTE — REASON FOR VISIT
[FreeTextEntry3] : Dr. 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  Prior Cancer Treatments: ------------------------------------------------------------------------ Chemo/targeted therapy: 3/29/2023-7/2023: Ke[facility] 522 regimen: pembrolizumab q6 weeks + TC followed by AC ------------------------------------------------------------------------ Surgery: 2017: thyroidectomy ------------------------------------------------------------------------ Radiation:

## 2023-08-31 ENCOUNTER — NON-APPOINTMENT (OUTPATIENT)
Age: 58
End: 2023-08-31

## 2023-09-02 LAB
CULTURE RESULTS: SIGNIFICANT CHANGE UP
SPECIMEN SOURCE: SIGNIFICANT CHANGE UP

## 2023-09-05 ENCOUNTER — APPOINTMENT (OUTPATIENT)
Dept: BREAST CENTER | Facility: AMBULATORY SURGERY CENTER | Age: 58
End: 2023-09-05
Payer: MEDICAID

## 2023-09-05 ENCOUNTER — RESULT REVIEW (OUTPATIENT)
Age: 58
End: 2023-09-05

## 2023-09-05 ENCOUNTER — OUTPATIENT (OUTPATIENT)
Dept: OUTPATIENT SERVICES | Facility: HOSPITAL | Age: 58
LOS: 1 days | End: 2023-09-05
Payer: MEDICAID

## 2023-09-05 ENCOUNTER — APPOINTMENT (OUTPATIENT)
Dept: NUCLEAR MEDICINE | Facility: HOSPITAL | Age: 58
End: 2023-09-05

## 2023-09-05 DIAGNOSIS — Z98.891 HISTORY OF UTERINE SCAR FROM PREVIOUS SURGERY: Chronic | ICD-10-CM

## 2023-09-05 DIAGNOSIS — Z90.710 ACQUIRED ABSENCE OF BOTH CERVIX AND UTERUS: Chronic | ICD-10-CM

## 2023-09-05 DIAGNOSIS — Z98.890 OTHER SPECIFIED POSTPROCEDURAL STATES: Chronic | ICD-10-CM

## 2023-09-05 PROCEDURE — 78195 LYMPH SYSTEM IMAGING: CPT | Mod: 26

## 2023-09-05 PROCEDURE — 76098 X-RAY EXAM SURGICAL SPECIMEN: CPT | Mod: 26

## 2023-09-05 PROCEDURE — A9541: CPT

## 2023-09-05 PROCEDURE — 38525 BIOPSY/REMOVAL LYMPH NODES: CPT | Mod: RT

## 2023-09-05 PROCEDURE — 78195 LYMPH SYSTEM IMAGING: CPT

## 2023-09-05 PROCEDURE — 19301 PARTIAL MASTECTOMY: CPT | Mod: RT

## 2023-09-05 PROCEDURE — 14000 TIS TRNFR TRUNK 10 SQ CM/<: CPT | Mod: RT

## 2023-09-07 NOTE — PAST MEDICAL HISTORY
[History of Hormone Replacement Treatment] : has no history of hormone replacement treatment [de-identified] : partial hyserectomy  [FreeTextEntry6] : no [FreeTextEntry7] : no [FreeTextEntry8] : no

## 2023-09-07 NOTE — HISTORY OF PRESENT ILLNESS
[FreeTextEntry1] : Patient is a 57 yo F who presents today for post-op evaluation. Patient is s/p neoadjuvant chemotherapy w/ Dr. 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 She completed TC therapy 6/14/23 and received 1 cycle of AC w/ 3rd for of pembro 6/21/23 but could not complete any additional doses due to BOOP vs drug induced pneumonitis and decreased EF. S/p R nloc lumpx & SNB 9/5/23. Surgical path yielded... Denies family history of breast or ovarian cancer. She is BRCA/9 panel negative (tested 2023). Patient denies skin changes or nipple discharge bilaterally. Of note, patient has hx of papillary thyroid cancer treated w/ total thyroidectomy 3/2017.  5/23/22: B/l MG (LHR)- heterogeneously dense. ROCKY. BI-RADS 2 2/7/23: R MG & B/l US (R palpable mass)- heterogenously dense. R 2.6 cm mass w/ heterogeneous calcs UOQ (palpable). US- R 2.1cm lobulated heterogeneous mass w/ calcs 10:00 10FN (palpable- rec US bx). L 0.6cm cyst 5:00 6FN. BI-RADS 4 2/27/23: R US bx 2.3cm mass 10:00 10FN (heart clip)- IDC (poorly differentiated), ER- (0%), AZ- (0%), HER2- (0). Concordant- recommend definitive surgical and oncological management. 3/23/23: MRI- R 2.8cm enhancing mass w/ central necrosis c/w known malignancy UOQ. B/l LN appear morphologically normal. No evidence of multifocal, multicentric, or contralateral disease. BI-RADS 6 3/23/23: L MG- heterogenously dense. L stable punctate microcalcs. BI-RADS 2  7/31/23: B/l MG & US- heterogeneously dense. R focal asymmetry w/ tissue marker bx proven malignancy 14FN. L indeterminant amorphous calcs (rec stereo bx). US- R 2.5 cm irregular shaped mass bx proven malignancy w/ measurements similar to prior study and overall volume significant decrease 10:00 10FN. BI-RADS 4 7/31/23: MRI- R mass no longer visualized site of bx proven malignant. R 0.7cm intramammary LN outer central 9FN. Rec stereo bx of L calcs. BI-RADS 4 8/7/23: L stereo bx x 2- SITE 1) L lateral calcs (cork clip)- benign breast tissue w/ mild fibrocystic changes and calcs. Benign and concordant- rec 6m f/u MG SITE 2) L central calcs (hourglass clip)- benign breast tissue w/ fibrocystic changes associated w/ calcs. Benign and concordant- rec 6m f/u MG 9/5/23: R nloc lumpx & SNB- 
Yes...

## 2023-09-07 NOTE — PHYSICAL EXAM
[de-identified] : R malignancy no longer palpable 10:00 [de-identified] : L breast/axilla/supraclavicular area: No masses, discharge, or adenopathy\par

## 2023-09-12 ENCOUNTER — LABORATORY RESULT (OUTPATIENT)
Age: 58
End: 2023-09-12

## 2023-09-12 ENCOUNTER — NON-APPOINTMENT (OUTPATIENT)
Age: 58
End: 2023-09-12

## 2023-09-12 ENCOUNTER — APPOINTMENT (OUTPATIENT)
Dept: ENDOCRINOLOGY | Facility: CLINIC | Age: 58
End: 2023-09-12
Payer: MEDICAID

## 2023-09-12 VITALS
WEIGHT: 221 LBS | BODY MASS INDEX: 34.61 KG/M2 | DIASTOLIC BLOOD PRESSURE: 74 MMHG | SYSTOLIC BLOOD PRESSURE: 124 MMHG | HEART RATE: 92 BPM

## 2023-09-12 DIAGNOSIS — E11.9 TYPE 2 DIABETES MELLITUS W/OUT COMPLICATIONS: ICD-10-CM

## 2023-09-12 DIAGNOSIS — E89.0 POSTPROCEDURAL HYPOTHYROIDISM: ICD-10-CM

## 2023-09-12 LAB — GLUCOSE BLDC GLUCOMTR-MCNC: 141

## 2023-09-12 PROCEDURE — 99213 OFFICE O/P EST LOW 20 MIN: CPT | Mod: 25

## 2023-09-12 PROCEDURE — 36415 COLL VENOUS BLD VENIPUNCTURE: CPT | Mod: 59

## 2023-09-12 PROCEDURE — 82962 GLUCOSE BLOOD TEST: CPT

## 2023-09-13 ENCOUNTER — NON-APPOINTMENT (OUTPATIENT)
Age: 58
End: 2023-09-13

## 2023-09-14 ENCOUNTER — APPOINTMENT (OUTPATIENT)
Dept: BREAST CENTER | Facility: CLINIC | Age: 58
End: 2023-09-14
Payer: MEDICAID

## 2023-09-14 ENCOUNTER — NON-APPOINTMENT (OUTPATIENT)
Age: 58
End: 2023-09-14

## 2023-09-14 VITALS
HEIGHT: 67 IN | WEIGHT: 220 LBS | HEART RATE: 96 BPM | BODY MASS INDEX: 34.53 KG/M2 | SYSTOLIC BLOOD PRESSURE: 136 MMHG | DIASTOLIC BLOOD PRESSURE: 88 MMHG

## 2023-09-14 DIAGNOSIS — Z78.9 OTHER SPECIFIED HEALTH STATUS: ICD-10-CM

## 2023-09-14 PROBLEM — E11.9 TYPE 2 DIABETES MELLITUS WITHOUT COMPLICATION, WITHOUT LONG-TERM CURRENT USE OF INSULIN: Status: ACTIVE | Noted: 2022-09-19

## 2023-09-14 LAB
ALBUMIN SERPL ELPH-MCNC: 4.4 G/DL
ALP BLD-CCNC: 81 U/L
ALT SERPL-CCNC: 31 U/L
ANION GAP SERPL CALC-SCNC: 14 MMOL/L
AST SERPL-CCNC: 27 U/L
BILIRUB SERPL-MCNC: <0.2 MG/DL
BUN SERPL-MCNC: 16 MG/DL
CALCIUM SERPL-MCNC: 9.8 MG/DL
CHLORIDE SERPL-SCNC: 103 MMOL/L
CHOLEST SERPL-MCNC: 203 MG/DL
CO2 SERPL-SCNC: 25 MMOL/L
CREAT SERPL-MCNC: 0.96 MG/DL
CREAT SPEC-SCNC: 196 MG/DL
EGFR: 69 ML/MIN/1.73M2
ESTIMATED AVERAGE GLUCOSE: 217 MG/DL
FRUCTOSAMINE SERPL-MCNC: 325 UMOL/L
GLUCOSE SERPL-MCNC: 134 MG/DL
HBA1C MFR BLD HPLC: 9.2 %
HDLC SERPL-MCNC: 50 MG/DL
LDLC SERPL CALC-MCNC: 117 MG/DL
MICROALBUMIN 24H UR DL<=1MG/L-MCNC: 32.8 MG/DL
MICROALBUMIN/CREAT 24H UR-RTO: 168 MG/G
NONHDLC SERPL-MCNC: 153 MG/DL
POTASSIUM SERPL-SCNC: 4.8 MMOL/L
PROT SERPL-MCNC: 8.1 G/DL
SODIUM SERPL-SCNC: 142 MMOL/L
T4 FREE SERPL-MCNC: 3.3 NG/DL
TRIGL SERPL-MCNC: 209 MG/DL
TSH SERPL-ACNC: 0.72 UIU/ML

## 2023-09-14 PROCEDURE — 99024 POSTOP FOLLOW-UP VISIT: CPT

## 2023-09-14 RX ORDER — LEVOTHYROXINE SODIUM 0.07 MG/1
75 TABLET ORAL
Qty: 90 | Refills: 1 | Status: ACTIVE | COMMUNITY
Start: 2018-07-09 | End: 1900-01-01

## 2023-09-14 RX ORDER — LEVOTHYROXINE SODIUM 0.2 MG/1
200 TABLET ORAL
Qty: 90 | Refills: 1 | Status: ACTIVE | COMMUNITY
Start: 2020-03-09 | End: 1900-01-01

## 2023-09-18 ENCOUNTER — APPOINTMENT (OUTPATIENT)
Dept: HEMATOLOGY ONCOLOGY | Facility: CLINIC | Age: 58
End: 2023-09-18
Payer: MEDICAID

## 2023-09-18 VITALS
WEIGHT: 221 LBS | RESPIRATION RATE: 18 BRPM | HEART RATE: 94 BPM | TEMPERATURE: 98.7 F | HEIGHT: 67 IN | SYSTOLIC BLOOD PRESSURE: 152 MMHG | DIASTOLIC BLOOD PRESSURE: 98 MMHG | OXYGEN SATURATION: 97 % | BODY MASS INDEX: 34.69 KG/M2

## 2023-09-18 PROCEDURE — 99214 OFFICE O/P EST MOD 30 MIN: CPT | Mod: 25

## 2023-09-18 PROCEDURE — 36415 COLL VENOUS BLD VENIPUNCTURE: CPT

## 2023-09-18 RX ORDER — LEVOTHYROXINE SODIUM 0.09 MG/1
88 TABLET ORAL
Qty: 90 | Refills: 0 | Status: DISCONTINUED | COMMUNITY
Start: 2023-06-20 | End: 2023-09-18

## 2023-09-19 ENCOUNTER — APPOINTMENT (OUTPATIENT)
Dept: MRI IMAGING | Facility: HOSPITAL | Age: 58
End: 2023-09-19

## 2023-09-19 ENCOUNTER — NON-APPOINTMENT (OUTPATIENT)
Age: 58
End: 2023-09-19

## 2023-09-20 LAB
CULTURE RESULTS: SIGNIFICANT CHANGE UP
SPECIMEN SOURCE: SIGNIFICANT CHANGE UP

## 2023-09-21 ENCOUNTER — NON-APPOINTMENT (OUTPATIENT)
Age: 58
End: 2023-09-21

## 2023-09-22 ENCOUNTER — NON-APPOINTMENT (OUTPATIENT)
Age: 58
End: 2023-09-22

## 2023-10-05 ENCOUNTER — APPOINTMENT (OUTPATIENT)
Dept: PULMONOLOGY | Facility: CLINIC | Age: 58
End: 2023-10-05
Payer: MEDICAID

## 2023-10-05 VITALS
DIASTOLIC BLOOD PRESSURE: 97 MMHG | WEIGHT: 231 LBS | TEMPERATURE: 98.1 F | SYSTOLIC BLOOD PRESSURE: 155 MMHG | RESPIRATION RATE: 12 BRPM | HEART RATE: 91 BPM | OXYGEN SATURATION: 97 % | HEIGHT: 67 IN | BODY MASS INDEX: 36.26 KG/M2

## 2023-10-05 PROCEDURE — 99213 OFFICE O/P EST LOW 20 MIN: CPT

## 2023-10-19 ENCOUNTER — APPOINTMENT (OUTPATIENT)
Dept: RADIATION ONCOLOGY | Facility: CLINIC | Age: 58
End: 2023-10-19
Payer: MEDICAID

## 2023-10-19 VITALS
TEMPERATURE: 98.1 F | HEART RATE: 80 BPM | HEIGHT: 67 IN | BODY MASS INDEX: 36.26 KG/M2 | DIASTOLIC BLOOD PRESSURE: 90 MMHG | RESPIRATION RATE: 14 BRPM | OXYGEN SATURATION: 100 % | WEIGHT: 231 LBS | SYSTOLIC BLOOD PRESSURE: 140 MMHG

## 2023-10-19 PROCEDURE — 99203 OFFICE O/P NEW LOW 30 MIN: CPT

## 2023-10-19 RX ORDER — LATANOPROST/PF 0.005 %
0.01 DROPS OPHTHALMIC (EYE)
Refills: 0 | Status: ACTIVE | COMMUNITY
Start: 2023-10-19

## 2023-10-22 ENCOUNTER — NON-APPOINTMENT (OUTPATIENT)
Age: 58
End: 2023-10-22

## 2023-12-05 ENCOUNTER — APPOINTMENT (OUTPATIENT)
Dept: ENDOCRINOLOGY | Facility: CLINIC | Age: 58
End: 2023-12-05

## 2023-12-05 ENCOUNTER — NON-APPOINTMENT (OUTPATIENT)
Age: 58
End: 2023-12-05

## 2023-12-05 VITALS — BODY MASS INDEX: 35.24 KG/M2 | WEIGHT: 225 LBS

## 2023-12-06 ENCOUNTER — NON-APPOINTMENT (OUTPATIENT)
Age: 58
End: 2023-12-06

## 2023-12-08 ENCOUNTER — NON-APPOINTMENT (OUTPATIENT)
Age: 58
End: 2023-12-08

## 2023-12-12 ENCOUNTER — NON-APPOINTMENT (OUTPATIENT)
Age: 58
End: 2023-12-12

## 2023-12-14 ENCOUNTER — NON-APPOINTMENT (OUTPATIENT)
Age: 58
End: 2023-12-14

## 2023-12-18 NOTE — REVIEW OF SYSTEMS
[Nausea: Grade 0] : Nausea: Grade 0 [Fatigue: Grade 1 - Fatigue relieved by rest] : Fatigue: Grade 1 - Fatigue relieved by rest [Breast Pain: Grade 0] : Breast Pain: Grade 0 [Headache: Grade 0] : Headache: Grade 0 [Anxiety: Grade 0] : Anxiety: Grade 0  [Depression: Grade 0] : Depression: Grade 0 [Insomnia: Grade 0] : Insomnia: Grade 0 [Pruritus: Grade 0] : Pruritus: Grade 0 [Skin Atrophy: Grade 0] : Skin Atrophy: Grade 0 [Skin Hyperpigmentation: Grade 0] : Skin Hyperpigmentation: Grade 0 [Skin Induration: Grade 0] : Skin Induration: Grade 0 [Dermatitis Radiation: Grade 0] : Dermatitis Radiation: Grade 0

## 2023-12-19 ENCOUNTER — NON-APPOINTMENT (OUTPATIENT)
Age: 58
End: 2023-12-19

## 2023-12-19 NOTE — HISTORY OF PRESENT ILLNESS
[FreeTextEntry1] : Ms. Gaines presents today for consideration for radiation therapy for right sided breast cancer.  She has DM, HTN, HLD, ?cystic lung disease and BOOP, and papillary thyroid CA s/p thyroidectomy and RICH in 2017.  OTV APPTS:  23 - OTV - Patient has completed 2915/4240cGy of radiation to right breast. Today patient states she is feeling good. She denies pain, skin changes. She endorses mild fatigue. Patient to continue radiation as planned.   23 - OTV - Patient has completed 1590/4240cGy of radiation to right breast.  She states she is feeling well, endorses mild fatigue. Denies fever, chills, pain, skin changes.  mometasone cream sent. patient to continue radiation  23 - OTV - Patient has completed 265/4240cGy of radiation to right breast.  She tolerated her first treatment Well  Appeitie and energy level are good. Reviewed some side effects fo radiation. Right breast intact no discoloration noted Denies any pain or tenderness. Will continue her curren RT treatments as planned.  ------------------------------ Mammogram done 2023 showed: Right breast, 10 oclock, 10 cm from nipple, area of palpable concern: There is a lobulated heterogeneous mass with calcifications measuring 2.1 X 1.5 X 2.1 cm. Recommended ultrasound guided biopsy.   2023 she underwent a right breast biopsy, 10:00, 10cm FN. This revealed poorly differentiated invasive ductal carcinoma measuring 1.0 cm in maximal length in this material. ER-, [place]-, HER-2 - Upon review at [facility] 3/16/2023 this showed  -   Invasive poorly differentiated ductal carcinoma associated with necrosis and calcifications, 14 mm in greatest dimension.  3/23/2023 left breast diagnostic mammogram showed left breast punctate microcalcifications are stable. No findings suspicious for malignancy.  Bilateral breast MRI  3/23/2023 showed: 2.8 cm enhancing mass with central necrosis upper outer right breast consistent with the recently diagnosed malignancy. No evidence of multifocal, multicentric, or contralateral disease. No evidence of pathologic lymphadenopathy.  2023 right middle lobe BAL - negative for malignant cells RML FNA- negative for malignant cells  Started neoadjuvant carbo/taxol/pembro 3/29 with plan for: carbo/taxol X 12, followed by adriamycin/cytoxan every 2 weeks with pembro every 6 weeks.   2023 bilateral mammogram and ultrasound showed: 1. biopsy-proven right breast malignancy, overall volume decreased significantly though size measures 1.5 X 1.2 X 1.4 cm, previously 2.3 X 1.5 2.1 cm, partial treatment effect 2. indeterminant left breast calcifications central posterior breast. Recommend stereotactic core biopsy.   MRI bilateral breasts 2023 showed good treatment response with no residual mass in the right upper outer breast. Mass previously measured 2.8 X 2.5 X. 2.8 cm. Patient has known amorphous edgar  2022 LLL FNA was negative for malignant cells   She underwent a right sided needle loc lumpectomy on 2023. This revealed: .  Breast, right, excision: - No in situ and invasive carcinoma is seen within the tumor bed area. - Lobular carcinoma in situ (LCIS). - Background breast tissue with calcifications. - Prior procedure site changes. 2.  Breast, right medial margin: - Benign breast tissue. 3.  Breast, right new posterior lateral margin: - Benign breast tissue with calcifications. 4.  Breast, right superior margin: - Benign breast tissue. 5.  Breast, right inferior margin: - Benign breast tissue. 6.  [place] lymph node, right axilla: - Three lymph nodes negative for tumor (0/3).  Left lung core biopsy done 2023 showed  Lung, left lower lobe, core biopsy -   Lung parenchyma with lympho-plasmacytic aggregates, edematous Boop-like foci, rare poorly formed microgranulomas, and diffuse reactive/ metaplastic epithelial changes. -   No increased eosinophils visualized. -   No malignancy visualized (see note)  Left breast biopsy 2023 showed  1. Breast, left lateral, biopsy: - Benign breast tissue with mild fibrocystic changes and calcifications. 2. Breast, left central, biopsy: - Benign breast tissue with fibrocystic changes associated with calcifications.  Ultimately carbo held after neutropenia, last dose of taxol held due to neuropathy. AC not completed due to ongoing BOOP and ? drug reaction.   Following with Dr. Parisi of pulmonary. No treatment for BOOP at this time.   Following with Dr. Kaity moreno, referred for rad onc evaluation.   Today she feels well. Notes chemo was well tolerated. Denies pain, nausea. Gets a stretching sensation in her right arm when she lifts it above her head but is not very uncomfortable.   No family history of breast cancer or GYN cancer no pacemakers or defibrillators RICH in the past, denies history of external beam radiation  menarche age 15 menopause in 40's birth control use >5 years no HRT, No fertility treatments

## 2023-12-19 NOTE — DISEASE MANAGEMENT
[Clinical] : TNM Stage: c [IIB] : IIB [TTNM] : 2 [NTNM] : 0 [MTNM] : 0 [de-identified] : [id number] [[place] [id number]] : [id number] [de-identified] : right breast

## 2023-12-21 ENCOUNTER — APPOINTMENT (OUTPATIENT)
Dept: PULMONOLOGY | Facility: CLINIC | Age: 58
End: 2023-12-21
Payer: MEDICAID

## 2023-12-21 VITALS
HEIGHT: 67 IN | DIASTOLIC BLOOD PRESSURE: 85 MMHG | TEMPERATURE: 98.1 F | SYSTOLIC BLOOD PRESSURE: 145 MMHG | HEART RATE: 106 BPM | WEIGHT: 224 LBS | RESPIRATION RATE: 12 BRPM | BODY MASS INDEX: 35.16 KG/M2 | OXYGEN SATURATION: 96 %

## 2023-12-21 DIAGNOSIS — C73 MALIGNANT NEOPLASM OF THYROID GLAND: ICD-10-CM

## 2023-12-21 DIAGNOSIS — R06.02 SHORTNESS OF BREATH: ICD-10-CM

## 2023-12-21 DIAGNOSIS — J84.89 OTHER SPECIFIED INTERSTITIAL PULMONARY DISEASES: ICD-10-CM

## 2023-12-21 PROCEDURE — 99213 OFFICE O/P EST LOW 20 MIN: CPT

## 2023-12-22 NOTE — ASSESSMENT
[FreeTextEntry1] : 58 yof with Tripple negative breast cancer, DM, HTN, HLD, with hx of BOOP (15 years ago), Papillary thyroid ca s/p thyroidectomy s/p lumpectomy with LN dissection.  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 Based on biopsy this does not look like CI related pneumonitis, rather than CI induced/recurrence of native disease. Was considering treatment in mid summer, however PFT were good, patient was asymptomatic and did not desaturates on exertion.  Currently she does not have dyspnea, symptoms such as overall fatigue seem to be related to ongoing RT therapy.   In the past her lung disease resolved without intervention, although it is generally very uncommon. It seems that it happened again and her CI therapy induced boop resolved w/o treatment. Latest CT for RT planning does not show peribronchial opacities any more. Given it has bene over three months since last PFTs and she is now undergoing radiation (with risk now for further induction of her disease), would repeat PFTs at this juncture (tentatively in 1 month so that it can be done after completion of radiation).   If everything stable, can follow up again in 6 months. Sooner for acute issues, which may warrant repeat imaging.

## 2023-12-22 NOTE — PHYSICAL EXAM
[No Acute Distress] : no acute distress [Normal Oropharynx] : normal oropharynx [Normal Appearance] : normal appearance [No Neck Mass] : no neck mass [Normal Rate/Rhythm] : normal rate/rhythm [Normal S1, S2] : normal s1, s2 [No Murmurs] : no murmurs [No Resp Distress] : no resp distress [Clear to Auscultation Bilaterally] : clear to auscultation bilaterally [No Abnormalities] : no abnormalities [Benign] : benign [Normal Gait] : normal gait [No Clubbing] : no clubbing [No Cyanosis] : no cyanosis [No Edema] : no edema [FROM] : FROM [Normal Color/ Pigmentation] : normal color/ pigmentation [No Focal Deficits] : no focal deficits [Oriented x3] : oriented x3 [Normal Affect] : normal affect [Well Groomed] : well groomed [Supple] : supple

## 2023-12-22 NOTE — REVIEW OF SYSTEMS
[Fatigue] : fatigue [SOB on Exertion] : sob on exertion [Negative] : Musculoskeletal [Fever] : no fever [Chills] : no chills [Dry Eyes] : no dry eyes [Cough] : no cough [Chest Tightness] : no chest tightness [Sputum] : no sputum [Pleuritic Pain] : no pleuritic pain [Chest Discomfort] : no chest discomfort [Syncope] : no syncope [Immunocompromised] : immunocompromised [GERD] : no gerd [Rash] : no rash [Headache] : no headache [Depression] : no depression [Anxiety] : no anxiety [TextBox_104] : dry skin

## 2023-12-22 NOTE — END OF VISIT
[] : Fellow [FreeTextEntry3] : I, personally performed the evaluation and management (E/M) services for this established patient who presents today with (a) new problem(s)/exacerbation of (an) existing condition(s).  That E/M includes conducting the examination, assessing all new/exacerbated conditions, and establishing a new plan of care.  Today, my ACP/fellow was here to observe my evaluation and management services for this new problem/exacerbated condition to be followed going forward. [Time Spent: ___ minutes] : I have spent [unfilled] minutes of time on the encounter.

## 2023-12-22 NOTE — HISTORY OF PRESENT ILLNESS
[Never] : never [TextBox_4] : 58 yof with Tripple negative breast cancer, DM, HTN, HLD,  with ?hx of cystic lung disease and BOOP (15 years ago), Papillary thyroid ca s/p thyroidectomy referred to IP for abnormal CT scan performed at OSH with multiple peribronchovascular lung nodules in setting of h/o BOOP( dx 15 years ago by bx in [facility], resolved spontaneously).  Bronchoscopy showed: LUNG, RIGHT MIDDLE LOBE, FNA: NEGATIVE FOR MALIGNANT CELLS The specimen is hypocellular.  Examination reveals rare fragments of crushed lung parenchyma and some fibrous tissue.  Rare ciliated bronchial epithelial cells are also noted.  The cellblock is scant and shows similar findings.  Lung right middle lobe foreceps biopsy and slide 1.  Lung, right middle lobe, biopsy and touch prep:-  Crushed lung parenchyma with mild mixed inflammation, see note Note: Multiple levels of the specimen are examined.  Examination reveals crushed alveolar lung parenchyma.  There is a mild, mixed inflammatory infiltrate composed of neutrophils, lymphocytes, and rare plasma cells. Granulomatous inflammation is not identified.  There are no features of an organizing pneumonia or interstitial lung disease.  There is no evidence of neoplasia.  Overall these findings are nonspecific and do not account for a mass or lesion.   She was mildly dyspneic and her PFT from Jul;y 2023 showed mild restriction and moderately reduced DLCO. TTNA was performed on 8/17/23: Pathology revealed:, left lower lobe, core biopsy :Lung parenchyma with lympho-plasmacytic aggregates, edematous Boop-like foci, rare poorly formed microgranulomas, and diffuse reactive/metaplastic epithelial changes.  No increased eosinophils visualized.  No malignancy visualized (see note)  Tumor Proportion Score: <1% / Negative  Month later she already felt better, dyspnea on exertion resolved. Able to walk10 block, but when moving up to the hill she has some dyspnea.  She denied nocturnal symptoms. repeat PFT: 8/15/23: FEV1:1.99L (81% FVC: 2.47L(80%) FEV1/FVC:80 TLCL3.48L(64%) VC:2.47L(80%) DLCO:53%  10/5/2023: s/p R  lumpx & SNB- no residual invasive carcinoma, LCIS, negative margins, 0/3LN on 9/5/2023. Patient is recovering well. Exercise tolerance goof can walk 10 block or even more, flat. Lung findings were though to be immunotherapy triggered boop recurrence with limited symptoms and improving after cessation of immunotherapy. She denies any dyspnea except when using chemicals for cleaning, which can trigger dyspnea. Dyspnea goes away when she leaves the room. Off any treatment for cancer, not needed based on path as per onc.  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 Feels fatigued, however started her RT, she had two weeks of therapy, scheduled for 4 weeks total. No dyspnea, fatigue is related to low energy. No cough. No triggers for dyspnea, but is limited by aforementioned weakness. Can walk about 10 blocks on flat surface, does not attempt stairs. No fevers or chills. No plans for further therapy after radiation at this time. No LE swelling or pain.

## 2023-12-26 ENCOUNTER — NON-APPOINTMENT (OUTPATIENT)
Age: 58
End: 2023-12-26

## 2023-12-26 RX ORDER — MOMETASONE FUROATE 1 MG/G
0.1 OINTMENT TOPICAL TWICE DAILY
Qty: 1 | Refills: 2 | Status: DISCONTINUED | COMMUNITY
Start: 2023-12-12 | End: 2023-12-26

## 2023-12-26 NOTE — HISTORY OF PRESENT ILLNESS
[FreeTextEntry1] : Ms. Gaines presents today for consideration for radiation therapy for right sided breast cancer.  She has DM, HTN, HLD, ?cystic lung disease and BOOP, and papillary thyroid CA s/p thyroidectomy and RICH in 2017.  OTV APPTS:  23 - OTV - Patient has completed  + 0/4 fx or 3445/4240 +  cGy of radiation to right breast. Patient endorses new skin irritation with moist drainage to underside of right breast. Patient states she is not using any creams and is using gauze to protect the area. Patient denies fatigue, fevers, pain, nausea. Silvadene sent to pharmacy, patient advised to use this on any open areas with drainage. Mometasone sent to pharmacy, patient advised to use this on intact skin only to manage itching. Patient to continue radiation.   23 - OTV - Patient has completed 2915/4240cGy of radiation to right breast. Today patient states she is feeling good. She denies pain, skin changes. She endorses mild fatigue. Patient to continue radiation as planned.   23 - OTV - Patient has completed 1590/4240cGy of radiation to right breast.  She states she is feeling well, endorses mild fatigue. Denies fever, chills, pain, skin changes.  mometasone cream sent. patient to continue radiation  23 - OTV - Patient has completed 265/4240cGy of radiation to right breast.  She tolerated her first treatment Well  Appeitie and energy level are good. Reviewed some side effects fo radiation. Right breast intact no discoloration noted Denies any pain or tenderness. Will continue her curren RT treatments as planned.  ------------------------------ Mammogram done 2023 showed: Right breast, 10 oclock, 10 cm from nipple, area of palpable concern: There is a lobulated heterogeneous mass with calcifications measuring 2.1 X 1.5 X 2.1 cm. Recommended ultrasound guided biopsy.   2023 she underwent a right breast biopsy, 10:00, 10cm FN. This revealed poorly differentiated invasive ductal carcinoma measuring 1.0 cm in maximal length in this material. ER-, [place]-, HER-2 - Upon review at [facility] 3/16/2023 this showed  -   Invasive poorly differentiated ductal carcinoma associated with necrosis and calcifications, 14 mm in greatest dimension.  3/23/2023 left breast diagnostic mammogram showed left breast punctate microcalcifications are stable. No findings suspicious for malignancy.  Bilateral breast MRI  3/23/2023 showed: 2.8 cm enhancing mass with central necrosis upper outer right breast consistent with the recently diagnosed malignancy. No evidence of multifocal, multicentric, or contralateral disease. No evidence of pathologic lymphadenopathy.  2023 right middle lobe BAL - negative for malignant cells RML FNA- negative for malignant cells  Started neoadjuvant carbo/taxol/pembro 3/29 with plan for: carbo/taxol X 12, followed by adriamycin/cytoxan every 2 weeks with pembro every 6 weeks.   2023 bilateral mammogram and ultrasound showed: 1. biopsy-proven right breast malignancy, overall volume decreased significantly though size measures 1.5 X 1.2 X 1.4 cm, previously 2.3 X 1.5 2.1 cm, partial treatment effect 2. indeterminant left breast calcifications central posterior breast. Recommend stereotactic core biopsy.   MRI bilateral breasts 2023 showed good treatment response with no residual mass in the right upper outer breast. Mass previously measured 2.8 X 2.5 X. 2.8 cm. Patient has known amorphous edgar  2022 LLL FNA was negative for malignant cells   She underwent a right sided needle loc lumpectomy on 2023. This revealed: .  Breast, right, excision: - No in situ and invasive carcinoma is seen within the tumor bed area. - Lobular carcinoma in situ (LCIS). - Background breast tissue with calcifications. - Prior procedure site changes. 2.  Breast, right medial margin: - Benign breast tissue. 3.  Breast, right new posterior lateral margin: - Benign breast tissue with calcifications. 4.  Breast, right superior margin: - Benign breast tissue. 5.  Breast, right inferior margin: - Benign breast tissue. 6.  [place] lymph node, right axilla: - Three lymph nodes negative for tumor (0/3).  Left lung core biopsy done 2023 showed  Lung, left lower lobe, core biopsy -   Lung parenchyma with lympho-plasmacytic aggregates, edematous Boop-like foci, rare poorly formed microgranulomas, and diffuse reactive/ metaplastic epithelial changes. -   No increased eosinophils visualized. -   No malignancy visualized (see note)  Left breast biopsy 2023 showed  1. Breast, left lateral, biopsy: - Benign breast tissue with mild fibrocystic changes and calcifications. 2. Breast, left central, biopsy: - Benign breast tissue with fibrocystic changes associated with calcifications.  Ultimately carbo held after neutropenia, last dose of taxol held due to neuropathy. AC not completed due to ongoing BOOP and ? drug reaction.   Following with Dr. Parisi of pulmonary. No treatment for BOOP at this time.   Following with Dr. Kaity moreno, referred for rad onc evaluation.   Today she feels well. Notes chemo was well tolerated. Denies pain, nausea. Gets a stretching sensation in her right arm when she lifts it above her head but is not very uncomfortable.   No family history of breast cancer or GYN cancer no pacemakers or defibrillators RICH in the past, denies history of external beam radiation  menarche age 15 menopause in 40's birth control use >5 years no HRT, No fertility treatments

## 2023-12-26 NOTE — DISEASE MANAGEMENT
[Clinical] : TNM Stage: c [IIB] : IIB [TTNM] : 2 [MTNM] : 0 [NTNM] : 0 [de-identified] : [id number] [[place] [id number]] : [id number] [de-identified] : right breast

## 2023-12-26 NOTE — REVIEW OF SYSTEMS
[Nausea: Grade 0] : Nausea: Grade 0 [Breast Pain: Grade 0] : Breast Pain: Grade 0 [Headache: Grade 0] : Headache: Grade 0 [Anxiety: Grade 0] : Anxiety: Grade 0  [Depression: Grade 0] : Depression: Grade 0 [Insomnia: Grade 0] : Insomnia: Grade 0 [Pruritus: Grade 0] : Pruritus: Grade 0 [Skin Atrophy: Grade 0] : Skin Atrophy: Grade 0 [Skin Hyperpigmentation: Grade 0] : Skin Hyperpigmentation: Grade 0 [Skin Induration: Grade 0] : Skin Induration: Grade 0 [Esophagitis: Grade 0] : Esophagitis: Grade 0 [Fatigue: Grade 0] : Fatigue: Grade 0 [Dermatitis Radiation: Grade 2 - Moderate to brisk erythema; patchy moist desquamation, mostly confined to skin folds and creases; moderate edema] : Dermatitis Radiation: Grade 2 - Moderate to brisk erythema; patchy moist desquamation, mostly confined to skin folds and creases; moderate edema

## 2023-12-26 NOTE — PHYSICAL EXAM
[Normal] : oriented to person, place and time, the affect was normal, the mood was normal and not anxious [de-identified] : radiation dermatitis to underside of right breast

## 2023-12-27 ENCOUNTER — NON-APPOINTMENT (OUTPATIENT)
Age: 58
End: 2023-12-27

## 2023-12-29 ENCOUNTER — NON-APPOINTMENT (OUTPATIENT)
Age: 58
End: 2023-12-29

## 2024-01-02 ENCOUNTER — NON-APPOINTMENT (OUTPATIENT)
Age: 59
End: 2024-01-02

## 2024-01-02 NOTE — REVIEW OF SYSTEMS
[Esophagitis: Grade 0] : Esophagitis: Grade 0 [Nausea: Grade 0] : Nausea: Grade 0 [Fatigue: Grade 1 - Fatigue relieved by rest] : Fatigue: Grade 1 - Fatigue relieved by rest [Breast Pain: Grade 0] : Breast Pain: Grade 0 [Headache: Grade 0] : Headache: Grade 0 [Anxiety: Grade 0] : Anxiety: Grade 0  [Depression: Grade 0] : Depression: Grade 0 [Insomnia: Grade 0] : Insomnia: Grade 0 [Pruritus: Grade 1 - Mild or localized; topical intervention indicated] : Pruritus: Grade 1 - Mild or localized; topical intervention indicated [Skin Atrophy: Grade 0] : Skin Atrophy: Grade 0 [Skin Hyperpigmentation: Grade 0] : Skin Hyperpigmentation: Grade 0 [Skin Induration: Grade 0] : Skin Induration: Grade 0 [Dermatitis Radiation: Grade 2 - Moderate to brisk erythema; patchy moist desquamation, mostly confined to skin folds and creases; moderate edema] : Dermatitis Radiation: Grade 2 - Moderate to brisk erythema; patchy moist desquamation, mostly confined to skin folds and creases; moderate edema

## 2024-01-02 NOTE — PHYSICAL EXAM
[Normal] : oriented to person, place and time, the affect was normal, the mood was normal and not anxious [de-identified] : radiation dermatitis to underside of right breast

## 2024-01-02 NOTE — HISTORY OF PRESENT ILLNESS
[FreeTextEntry1] : Ms. Gaines presents today for consideration for radiation therapy for right sided breast cancer.  She has DM, HTN, HLD, ?cystic lung disease and BOOP, and papillary thyroid CA s/p thyroidectomy and RICH in 2017.  OTV APPTS: 24 - OTV - Patient has completed  + 2/4 fx or 4240/4240 + 500/1000cGy of radiation to right breast. Patient states moist descquamation to underside of right breast is slightly improved since using silvadene. She also endorses using mometasone to intact areas of skin to manage pruritis. She denies breast pain but does endorse intermittent nipple sensitivity. Denies fevers, nausea. Patient endorses mild fatigue. Patient to continue radiation.   23 - OTV - Patient has completed  + 0/4 fx or 3445/4240 +  cGy of radiation to right breast. Patient endorses new skin irritation with moist drainage to underside of right breast. Patient states she is not using any creams and is using gauze to protect the area. Patient denies fatigue, fevers, pain, nausea. Silvadene sent to pharmacy, patient advised to use this on any open areas with drainage. Mometasone sent to pharmacy, patient advised to use this on intact skin only to manage itching. Patient to continue radiation.   23 - OTV - Patient has completed 2915/4240cGy of radiation to right breast. Today patient states she is feeling good. She denies pain, skin changes. She endorses mild fatigue. Patient to continue radiation as planned.   23 - OTV - Patient has completed 1590/4240cGy of radiation to right breast.  She states she is feeling well, endorses mild fatigue. Denies fever, chills, pain, skin changes.  mometasone cream sent. patient to continue radiation  23 - OTV - Patient has completed 265/4240cGy of radiation to right breast.  She tolerated her first treatment Well  Appeitie and energy level are good. Reviewed some side effects fo radiation. Right breast intact no discoloration noted Denies any pain or tenderness. Will continue her curren RT treatments as planned.  ------------------------------ Mammogram done 2023 showed: Right breast, 10 oclock, 10 cm from nipple, area of palpable concern: There is a lobulated heterogeneous mass with calcifications measuring 2.1 X 1.5 X 2.1 cm. Recommended ultrasound guided biopsy.   2023 she underwent a right breast biopsy, 10:00, 10cm FN. This revealed poorly differentiated invasive ductal carcinoma measuring 1.0 cm in maximal length in this material. ER-, CT-, HER-2 - Upon review at [facility] 3/16/2023 this showed  -   Invasive poorly differentiated ductal carcinoma associated with necrosis and calcifications, 14 mm in greatest dimension.  3/23/2023 left breast diagnostic mammogram showed left breast punctate microcalcifications are stable. No findings suspicious for malignancy.  Bilateral breast MRI  3/23/2023 showed: 2.8 cm enhancing mass with central necrosis upper outer right breast consistent with the recently diagnosed malignancy. No evidence of multifocal, multicentric, or contralateral disease. No evidence of pathologic lymphadenopathy.  2023 right middle lobe BAL - negative for malignant cells RML FNA- negative for malignant cells  Started neoadjuvant carbo/taxol/pembro 3/29 with plan for: carbo/taxol X 12, followed by adriamycin/cytoxan every 2 weeks with pembro every 6 weeks.   2023 bilateral mammogram and ultrasound showed: 1. biopsy-proven right breast malignancy, overall volume decreased significantly though size measures 1.5 X 1.2 X 1.4 cm, previously 2.3 X 1.5 2.1 cm, partial treatment effect 2. indeterminant left breast calcifications central posterior breast. Recommend stereotactic core biopsy.   MRI bilateral breasts 2023 showed good treatment response with no residual mass in the right upper outer breast. Mass previously measured 2.8 X 2.5 X. 2.8 cm. Patient has known amorphous edgar  2022 LLL FNA was negative for malignant cells   She underwent a right sided needle loc lumpectomy on 2023. This revealed: .  Breast, right, excision: - No in situ and invasive carcinoma is seen within the tumor bed area. - Lobular carcinoma in situ (LCIS). - Background breast tissue with calcifications. - Prior procedure site changes. 2.  Breast, right medial margin: - Benign breast tissue. 3.  Breast, right new posterior lateral margin: - Benign breast tissue with calcifications. 4.  Breast, right superior margin: - Benign breast tissue. 5.  Breast, right inferior margin: - Benign breast tissue. 6.  [place] lymph node, right axilla: - Three lymph nodes negative for tumor (0/3).  Left lung core biopsy done 2023 showed  Lung, left lower lobe, core biopsy -   Lung parenchyma with lympho-plasmacytic aggregates, edematous Boop-like foci, rare poorly formed microgranulomas, and diffuse reactive/ metaplastic epithelial changes. -   No increased eosinophils visualized. -   No malignancy visualized (see note)  Left breast biopsy 2023 showed  1. Breast, left lateral, biopsy: - Benign breast tissue with mild fibrocystic changes and calcifications. 2. Breast, left central, biopsy: - Benign breast tissue with fibrocystic changes associated with calcifications.  Ultimately carbo held after neutropenia, last dose of taxol held due to neuropathy. AC not completed due to ongoing BOOP and ? drug reaction.   Following with Dr. Parisi of pulmonary. No treatment for BOOP at this time.   Following with Dr. Kaity moreno, referred for rad onc evaluation.   Today she feels well. Notes chemo was well tolerated. Denies pain, nausea. Gets a stretching sensation in her right arm when she lifts it above her head but is not very uncomfortable.   No family history of breast cancer or GYN cancer no pacemakers or defibrillators RICH in the past, denies history of external beam radiation  menarche age 15 menopause in 40's birth control use >5 years no HRT, No fertility treatments 5

## 2024-01-02 NOTE — DISEASE MANAGEMENT
[Clinical] : TNM Stage: c [TTNM] : 2 [NTNM] : 0 [MTNM] : 0 [IIB] : IIB [de-identified] : 6654 [de-identified] : 3329 [de-identified] : right breast

## 2024-01-19 ENCOUNTER — NON-APPOINTMENT (OUTPATIENT)
Age: 59
End: 2024-01-19

## 2024-01-30 ENCOUNTER — NON-APPOINTMENT (OUTPATIENT)
Age: 59
End: 2024-01-30

## 2024-02-05 ENCOUNTER — NON-APPOINTMENT (OUTPATIENT)
Age: 59
End: 2024-02-05

## 2024-02-19 ENCOUNTER — NON-APPOINTMENT (OUTPATIENT)
Age: 59
End: 2024-02-19

## 2024-02-22 ENCOUNTER — APPOINTMENT (OUTPATIENT)
Dept: RADIATION ONCOLOGY | Facility: CLINIC | Age: 59
End: 2024-02-22
Payer: MEDICAID

## 2024-02-22 ENCOUNTER — NON-APPOINTMENT (OUTPATIENT)
Age: 59
End: 2024-02-22

## 2024-02-22 PROCEDURE — 99024 POSTOP FOLLOW-UP VISIT: CPT

## 2024-02-22 NOTE — REASON FOR VISIT
[Home] : at home, [unfilled] , at the time of the visit. [Medical Office: ([facility])___] : at the medical office located in  [Patient] : the patient [This encounter was initiated by telehealth (audio with video) and converted to telephone (audio only) due to technical difficulties.] : This encounter was initiated by telehealth (audio with video) and converted to telephone (audio only) due to technical difficulties.

## 2024-02-22 NOTE — VITALS
[Maximal Pain Intensity: 0/10] : 0/10 [NoTreatment Scheduled] : no treatment scheduled [Least Pain Intensity: 0/10] : 0/10 [ECOG Performance Status: 0 - Fully active, able to carry on all pre-disease performance without restriction] : Performance Status: 0 - Fully active, able to carry on all pre-disease performance without restriction

## 2024-02-22 NOTE — HISTORY OF PRESENT ILLNESS
[FreeTextEntry1] : 58 year old female with a past medical history of history of papillary thyroid cancer s/p total thyroidectomy in 2017 (on synthroid), DM, HTN. She first palpated the right breast mass sometime in 2022 which prompted her to seek medical care. 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 She started Chemo/immuno on 3/29/23, it was held on 7/3/23 due to side effects. She was evaluated for SE but did not get any further treatment given PCR. She underwent a Right lumpectomy and sentinel lymph node bx 23. She started radiation on 2023 completing 4240/4240cGy to Right breast/axilla with a boost of 1000/1000cGy to R breast finishing on 24.  24 - PTE Today she feels well. She still has fatigue but is "pushing through" as much as she can. She offers no real complaint and knows it is "normal". She has no complaint of pain. Her skin has healed, she states it is still a little dark but is fine. Informed her this may not improve. She was understanding of this. She has a mammogram/us scheduled for tomorrow at [facility]. She has no questions or complaint at the moment.  ___________________________________ Mammogram done 2023 showed: Right breast, 10 oclock, 10 cm from nipple, area of palpable concern: There is a lobulated heterogeneous mass with calcifications measuring 2.1 X 1.5 X 2.1 cm. Recommended ultrasound guided biopsy.   2023 she underwent a right breast biopsy, 10:00, 10cm FN. This revealed poorly differentiated invasive ductal carcinoma measuring 1.0 cm in maximal length in this material. ER-, GA-, HER-2 - Upon review at [facility] 3/16/2023 this showed  -   Invasive poorly differentiated ductal carcinoma associated with necrosis and calcifications, 14 mm in greatest dimension.  3/23/2023 left breast diagnostic mammogram showed left breast punctate microcalcifications are stable. No findings suspicious for malignancy.  Bilateral breast MRI  3/23/2023 showed: 2.8 cm enhancing mass with central necrosis upper outer right breast consistent with the recently diagnosed malignancy. No evidence of multifocal, multicentric, or contralateral disease. No evidence of pathologic lymphadenopathy.  2023 right middle lobe BAL - negative for malignant cells RML FNA- negative for malignant cells  Started neoadjuvant carbo/taxol/pembro 3/29 with plan for: carbo/taxol X 12, followed by adriamycin/cytoxan every 2 weeks with pembro every 6 weeks.   2023 bilateral mammogram and ultrasound showed: 1. biopsy-proven right breast malignancy, overall volume decreased significantly though size measures 1.5 X 1.2 X 1.4 cm, previously 2.3 X 1.5 2.1 cm, partial treatment effect 2. indeterminant left breast calcifications central posterior breast. Recommend stereotactic core biopsy.   MRI bilateral breasts 2023 showed good treatment response with no residual mass in the right upper outer breast. Mass previously measured 2.8 X 2.5 X. 2.8 cm. Patient has known amorphous edgar  2022 LLL FNA was negative for malignant cells   She underwent a right sided needle loc lumpectomy on 2023. This revealed: .  Breast, right, excision: - No in situ and invasive carcinoma is seen within the tumor bed area. - Lobular carcinoma in situ (LCIS). - Background breast tissue with calcifications. - Prior procedure site changes. 2.  Breast, right medial margin: - Benign breast tissue. 3.  Breast, right new posterior lateral margin: - Benign breast tissue with calcifications. 4.  Breast, right superior margin: - Benign breast tissue. 5.  Breast, right inferior margin: - Benign breast tissue. 6.  [place] lymph node, right axilla: - Three lymph nodes negative for tumor (0/3).  Left lung core biopsy done 2023 showed  Lung, left lower lobe, core biopsy -   Lung parenchyma with lympho-plasmacytic aggregates, edematous Boop-like foci, rare poorly formed microgranulomas, and diffuse reactive/ metaplastic epithelial changes. -   No increased eosinophils visualized. -   No malignancy visualized (see note)  Left breast biopsy 2023 showed  1. Breast, left lateral, biopsy: - Benign breast tissue with mild fibrocystic changes and calcifications. 2. Breast, left central, biopsy: - Benign breast tissue with fibrocystic changes associated with calcifications.  Ultimately carbo held after neutropenia, last dose of taxol held due to neuropathy. AC not completed due to ongoing BOOP and ? drug reaction.   Following with Dr. Parisi of pulmonary. No treatment for BOOP at this time.   Following with Dr. Kaity moreno, referred for rad onc evaluation.   Today she feels well. Notes chemo was well tolerated. Denies pain, nausea. Gets a stretching sensation in her right arm when she lifts it above her head but is not very uncomfortable.   No family history of breast cancer or GYN cancer no pacemakers or defibrillators RICH in the past, denies history of external beam radiation  menarche age 15 menopause in 40's birth control use >5 years no HRT, No fertility treatments

## 2024-02-23 ENCOUNTER — NON-APPOINTMENT (OUTPATIENT)
Age: 59
End: 2024-02-23

## 2024-02-26 ENCOUNTER — NON-APPOINTMENT (OUTPATIENT)
Age: 59
End: 2024-02-26

## 2024-03-06 ENCOUNTER — NON-APPOINTMENT (OUTPATIENT)
Age: 59
End: 2024-03-06

## 2024-03-08 ENCOUNTER — NON-APPOINTMENT (OUTPATIENT)
Age: 59
End: 2024-03-08

## 2024-03-12 ENCOUNTER — NON-APPOINTMENT (OUTPATIENT)
Age: 59
End: 2024-03-12

## 2024-03-18 ENCOUNTER — APPOINTMENT (OUTPATIENT)
Dept: BREAST CENTER | Facility: CLINIC | Age: 59
End: 2024-03-18
Payer: MEDICAID

## 2024-03-18 ENCOUNTER — LABORATORY RESULT (OUTPATIENT)
Age: 59
End: 2024-03-18

## 2024-03-18 ENCOUNTER — APPOINTMENT (OUTPATIENT)
Dept: HEMATOLOGY ONCOLOGY | Facility: CLINIC | Age: 59
End: 2024-03-18
Payer: MEDICAID

## 2024-03-18 VITALS
HEART RATE: 91 BPM | DIASTOLIC BLOOD PRESSURE: 95 MMHG | SYSTOLIC BLOOD PRESSURE: 167 MMHG | WEIGHT: 228 LBS | OXYGEN SATURATION: 97 % | RESPIRATION RATE: 18 BRPM | HEIGHT: 67 IN | BODY MASS INDEX: 35.79 KG/M2 | TEMPERATURE: 97.5 F

## 2024-03-18 DIAGNOSIS — G62.9 POLYNEUROPATHY, UNSPECIFIED: ICD-10-CM

## 2024-03-18 DIAGNOSIS — Z92.89 PERSONAL HISTORY OF OTHER MEDICAL TREATMENT: ICD-10-CM

## 2024-03-18 DIAGNOSIS — L27.0 GENERALIZED SKIN ERUPTION DUE TO DRUGS AND MEDICAMENTS TAKEN INTERNALLY: ICD-10-CM

## 2024-03-18 DIAGNOSIS — E55.9 VITAMIN D DEFICIENCY, UNSPECIFIED: ICD-10-CM

## 2024-03-18 DIAGNOSIS — E89.0 POSTPROCEDURAL HYPOTHYROIDISM: ICD-10-CM

## 2024-03-18 PROCEDURE — 99214 OFFICE O/P EST MOD 30 MIN: CPT

## 2024-03-18 PROCEDURE — 99213 OFFICE O/P EST LOW 20 MIN: CPT

## 2024-03-18 RX ORDER — SILVER SULFADIAZINE 10 MG/G
1 CREAM TOPICAL TWICE DAILY
Qty: 1 | Refills: 0 | Status: DISCONTINUED | COMMUNITY
Start: 2023-12-26 | End: 2024-03-18

## 2024-03-18 NOTE — PHYSICAL EXAM
[Ambulatory and capable of all self care but unable to carry out any work activities] : Status 2- Ambulatory and capable of all self care but unable to carry out any work activities. Up and about more than 50% of waking hours [Normal] : affect appropriate [Normocephalic] : normocephalic [EOMI] : extra ocular movement intact [Supple] : supple [No Supraclavicular Adenopathy] : no supraclavicular adenopathy [No Cervical Adenopathy] : no cervical adenopathy [Restricted in physically strenuous activity but ambulatory and able to carry out work of a light or sedentary nature] : Status 1- Restricted in physically strenuous activity but ambulatory and able to carry out work of a light or sedentary nature, e.g., light house work, office work [de-identified] : clear [de-identified] : well healed surgical incision in R breast , now with R breast skin discoloration/hyperpigmentation but no skin breakdown [de-identified] : grade 1 dermatitis s/p RT over R breast

## 2024-03-18 NOTE — HISTORY OF PRESENT ILLNESS
[FreeTextEntry1] : Patient is a 58 yo F who presents today for breast cancer surveillance. Patient is s/p neoadjuvant chemotherapy w/ Dr. 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 She completed TC therapy 6/14/23 and received 1 cycle of AC w/ 3rd for of pembro 6/21/23 but could not complete any additional doses due to BOOP vs drug induced pneumonitis and decreased EF. S/p R nloc lumpx & SNB 9/5/23 yielding no residual invasive carcinoma, negative margins, 0/3LN. S/P RT 1/2024 (Dr. Wernicke). Denies family history of breast or ovarian cancer. She is BRCA/9 panel negative (tested 2023). Patient denies skin changes or nipple discharge bilaterally. Of note, patient has hx of papillary thyroid cancer treated w/ total thyroidectomy 3/2017.  TNM Staging(y): pT0, pN0, M0  5/23/22: B/l MG (L[facility])- heterogeneously dense. ROCKY. BI-RADS 2 2/7/23: R MG & B/l US (R palpable mass)- heterogenously dense. R 2.6 cm mass w/ heterogeneous calcs UOQ (palpable). US- R 2.1cm lobulated heterogeneous mass w/ calcs 10:00 10FN (palpable- rec US bx). L 0.6cm cyst 5:00 6FN. BI-RADS 4 2/27/23: R US bx 2.3cm mass 10:00 10FN (heart clip)- IDC (poorly differentiated), ER- (0%), ND- (0%), HER2- (0). Concordant- recommend definitive surgical and oncological management. 3/23/23: MRI- R 2.8cm enhancing mass w/ central necrosis c/w known malignancy UOQ. B/l LN appear morphologically normal. No evidence of multifocal, multicentric, or contralateral disease. BI-RADS 6 3/23/23: L MG- heterogenously dense. L stable punctate microcalcs. BI-RADS 2  7/31/23: B/l MG & US- heterogeneously dense. R focal asymmetry w/ tissue marker bx proven malignancy 14FN. L indeterminant amorphous calcs (rec stereo bx). US- R 2.5 cm irregular shaped mass bx proven malignancy w/ measurements similar to prior study and overall volume significant decrease 10:00 10FN. BI-RADS 4 7/31/23: MRI- R mass no longer visualized site of bx proven malignant. R 0.7cm intramammary LN outer central 9FN. Rec stereo bx of L calcs. BI-RADS 4 8/7/23: L stereo bx x 2- SITE 1) L lateral calcs (cork clip)- benign breast tissue w/ mild fibrocystic changes and calcs. Benign and concordant- rec 6m f/u MG SITE 2) L central calcs (hourglass clip)- benign breast tissue w/ fibrocystic changes associated w/ calcs. Benign and concordant- rec 6m f/u MG 9/5/23: R nloc lumpx & SNB- no residual invasive carcinoma, LCIS, negative margins, 0/3LN 2/23/24: L MG- heterogeneously dense, prev noted calcs appear relatively unchanged. BIRADS 2.

## 2024-03-18 NOTE — REVIEW OF SYSTEMS
[Diarrhea: Grade 0] : Diarrhea: Grade 0 [Negative] : Allergic/Immunologic [de-identified] : R lumpectomy one week ago [Fatigue] : fatigue [Skin Wound] : no skin wound [de-identified] : neuropathy in b/l fingers and toes

## 2024-03-18 NOTE — PAST MEDICAL HISTORY
[History of Hormone Replacement Treatment] : has no history of hormone replacement treatment [FreeTextEntry7] : no [de-identified] : partial hyserectomy  [FreeTextEntry6] : no [FreeTextEntry8] : no

## 2024-03-18 NOTE — HISTORY OF PRESENT ILLNESS
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   Here today for f/u.  Patient states she first palpated the right breast mass sometime in 2022 which prompted her to seek medical care. Patient's PCP Dr. Radha Sarah sent patient for imaging which yielded a 2.1cm lobulated heterogeneous mass on sonogram 10:00 10FN. She has no prior hx of breast surgery or bx. Denies family history of breast or ovarian cancer. Patient denies skin changes or nipple discharge bilaterally.   22: B/l MG (L[facility])- heterogeneously dense. ROCKY. BI-RADS 2 23: R MG & B/l US (R palpable mass)- heterogenously dense. R 2.6 cm mass w/ heterogeneous calcs UOQ (palpable). US- R 2.1cm lobulated heterogeneous mass w/ calcs 10:00 10FN (palpable- rec US bx). L 0.6cm cyst 5:00 6FN. BI-RADS 4 23: R US bx 2.3cm mass 10:00 10FN (heart clip)- IDC (poorly differentiated), ER- (0%), MA- (0%), HER2- (0). Concordant- recommend definitive surgical and oncological management.  3/23/23: MRI- R 2.8cm enhancing mass w/ central necrosis c/w known malignancy UOQ. B/l LN appear morphologically normal. No evidence of multifocal, multicentric, or contralateral disease. BI-RADS 6 3/23/23: L MG- heterogenously dense. L stable punctate microcalcs. BI-RADS 2 23 PFT: Normal FEC1/FVC ratio, no significant change with bronchodilator via spacer. Lung capacity is moderately-severely decreased. Diffusion capacity is moderately decreased. 23: B/l MG & US- heterogeneously dense. R focal asymmetry w/ tissue marker bx proven malignancy 14FN. L indeterminant amorphous calcs (rec stereo bx). US- R 2.5 cm irregular shaped mass bx proven malignancy w/ measurements similar to prior study and overall volume significant decrease 10:00 10FN. BI-RADS 4 23 MRI breast: Good treatment response, with no residual mass in the right upper outer breast. Mass previously measured 2.8 x 2.5 x 2.8 cm.  Patient has known amorphous calcifications in the left central breast for which biopsy was recommended earlier same day. 23 LLL lung biopsy: Lung parenchyma with lympho-plasmacytic aggregates, edematous Boop-like foci, rare poorly formed microgranulomas, and diffuse reactive/ metaplastic epithelial changes. No increased eosinophils visualized. No malignancy visualized. AFB and GMS stain for fungus negative. 23: L stereo bx x 2- SITE 1) L lateral calcs (cork clip)- benign breast tissue w/ mild fibrocystic changes and calcs, SITE 2) L central calcs (hourglass clip)- benign breast tissue w/ fibrocystic changes associated w/ calcs. CONCORDANCE PENDING. 23: Successful identification of a right axillary sentinel lymph node. 24 DIGITAL UNILATERAL LEFT DIAGNOSTIC MAMMOGRAM WITH CAD AND TOMOSYNTHESIS No mammographic evidence of malignancy. No significant change. Patient due for yearly mammogram in 2023.   OBhx: Menarche 15yo LMP s/p partial hysterectomy at age 45yo  did not breastfeed, age at first birth 18yo.  Denies h/o fertility treatments, denies h/o HRT and OCP [de-identified] : Patient here today for f/u.  Reports fatigue, mild discomfort in R shoulder, and numbness/tingling in b/l fingers. Finished RT in January, states with limited ROM at R shoulder. Also noting numbness in fingertips, not affecting function; gabapentin did not help.

## 2024-03-18 NOTE — PHYSICAL EXAM
[de-identified] : L breast/axilla/supraclavicular area: No masses, discharge, or adenopathy\par   [de-identified] : R well healing surgical incisions

## 2024-03-18 NOTE — ASSESSMENT
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   cT2 cN0 Stage IIB  Reviewed clinical utility of neoadjuvant chemoimmunotherapy as per the Keynote 522 clinical trial - recommending starting weekly carbo/taxol x12 followed by adriamycin/cytoxan every 2 weeks with continued pembrolizumab every 6 weeks throughout duration of chemotherapy IV through chemoport with growth factor support (Neulasta onbody injector) after AC.  Reviewed potential side effects of chemotherapy including but not limited to allergic reactions/infusion reactions, bone marrow suppression leading to fatigue and increased risk of infection and febrile neutropenia,anemia and thrombocytopenia needing supportive transfusions,hair loss, skin/nail changes, nausea/vomiting, stomatitis, abdominal pain, diarrhea/constipation, kidney and liver dysfunction with electrolyte disturbances, peripheral neuropathy, small risk of cardiomyopathy and secondary malignancies (ie hematologic malignancies due to chemotherapy). Also discussed potential side effects of immunotherapy (pembro) including inflammation of colon leading to colitis, inflammation of lining of lung or heart for example or other organs leading to pleuritis and pericarditis respectively, also other possible conditions such as hypophysitis or pancreatitis; reviewed treatment in such a case would involve stopping of the offending agent and initiating steroids.   Re-reviewed above risks of treatment, patient had all her questions answered and expressed verbal understanding and agreement to proceed with treatment. She signed a consent form.    5/31/23 To proceed with week 10*(correction) of treatment (off carboplatin due to neutropenia with week 4 of treatment) to proceed with Taxol; Pembro was given on 3/29, 5/10 and will continue every 6 wks. Compazine 10mg PO Q6hrs as needed for nausea/vomiting.  Cr increased today to 1.3 from 1.0 likely due to pembro administration on 5/10. To continue to monitor, encouraged patient to increase oral hydration. Will continue to monitor peripheral neuropathy affecting her hands, for now at grade 1.   06/07/23 ANC at goal, grade 1 neuropathy present.  Proceed with week 11 of Taxol.  Reports left throat pain when swallowing. Possible mucositis. Will prescribe MMW. If no improvement, will refer to ENT.  Will continue to monitor right arm pruritic rash, may be grade 1 IrAE. Recommend topical steroid.   6/14/23 Pt tearful today, states exhausted, neuropathy still grade 1 in hands, and 1to 2 in feet. Decided to hold Taxol today #12/12 today.  To start AC #1 with growth factor, and Pembro on 6/21/23.  On exam good response, no palpable breast mass.  MMW prn for throat pain which patient found helpful.  Dr Villa referral for anxiety.  6/21/23 To proceed with 1st AC treatment with Neulasta Onpro today and Pembro. Clinically with excellent response to treatment and good tolerance.  AE - grade 1 peripheral neuropathy in hands/feet.  Compazine prn nausea.   7/5/23  Pt with SOB on exertion after last treatment (AC and Pembro), no leg swelling. Symptoms present but much improved. CTA done in ER at 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 To check EKG, Echo, pulm evaluation with CT chest and bronchoscopy (discussed with Dr Melvina Campa).  Holding treatment today.   7/21/23 Echo with decrease in EF to 55% down from 70%.  s/p bronch with no evidence of malignancy and infection, but unclear if changes on CT chest are due to prior h/o BOOP vs drug induced pneumonitis - reviewed with patient in detail.  Referred to Dr Ivy, cardiologist due to decrease in EF.  Discussed case with Dr Kaity Bernal, whose team will set up  breast.   8/11/23 Here for f/u after seeing cardiologist and pulmonologist, s/p lung bx c/w BOOP vs reaction to Keytruda.  Dr Medina stated further adriamycin treatment can be given with carvedilol and Echo after treatment.  Given excellent response to chemotherapy on MR breast with CR on imaging) and possibility of drug reaction (ie to Cytoxa or Keytruda) with previous drop in EF, favor holding therapy for now and reassessing need for adjuvant treatment after breast surgery. Reviewed with patient that based on surgical pathology she may need additional immunotherapy +/- xeloda in case of no pCR; reviewed that the decision to resume Keytruda will be made with input from pulmonary. Patient to obtain cardiac, pulmonary clearance and letter from PMD prior to breast surgery.  9/18/23 Here for follow up after right lump and slnb on 9/5/23 c/w pCR.  Reviewed surgical pathology in detail discussing with patient the standard of care of continuing Pembrolizumab usually even in the setting of pCR but given her history of BOOP and the likely exacerbation of BOOP while on immunotherapy, I recommended holding any additional immunotherapy.  Also discussed that if there was no pCR would consider adj systemic therapy with Pembro +/- completion of AC but given pCR as well as prior AEs while on systemic therapy (including decrease in EF and SOB/likely exacerbation of BOOP) prefer not to resume treatment as the risks likely outweight the benefits given pCR.  No need for Xeloda given pCR reviewed as well.  Recommended to see radiation oncologist for adj treatment.   3/18/24 Here for f/u after completing adj RT in 1/2024. Pt c/o fatigue. s/p L breast mammo on 2/23/24 Birads 2, due for annual bilateral mammogram in July 2024, to see Dr FINN team today. Reviewed that surveillance breast imaging will be ordered by surgical team.  Blood work to evaluate possible etiologies of patient's fatigue incl micronutrient levels and thyroid function tests. IR for port removal.  Echo to follow up on prior study, asked pt to see cardiologist, Dr Carol Kraft. Amitriptyline 25mg/day for peripheral neuropathy (not affecting function, in her hands only) likely Taxol induced.  PT for limited ROM at R shoulder.  Asked pt to return in 3 months for follow up. Asked to return for follow up in 3 months and for now to continue to regularly follow up with her endocrinologist, and PMD. RTC in 3 months for follow up.

## 2024-03-18 NOTE — PAST MEDICAL HISTORY
Contact the pt to inquire if he has established care with a new PCP. If not, he needs to be scheduled a visit. He has canceled/no showed for his last two visits. Thanks   [Surgical Menopause] : The patient is in surgical menopause [Menarche Age ____] : age at menarche was [unfilled] [Menopause Age____] : age at menopause was [unfilled] [Total Preg ___] : G[unfilled] [Live Births ___] : P[unfilled]  [Age At Live Birth ___] : Age at live birth: [unfilled]

## 2024-03-20 ENCOUNTER — NON-APPOINTMENT (OUTPATIENT)
Age: 59
End: 2024-03-20

## 2024-03-20 PROBLEM — E89.0 POST-SURGICAL HYPOTHYROIDISM: Status: ACTIVE | Noted: 2017-04-03

## 2024-03-20 RX ORDER — LEVOTHYROXINE SODIUM 200 UG/1
200 CAPSULE ORAL
Qty: 90 | Refills: 1 | Status: ACTIVE | COMMUNITY
Start: 2024-03-20 | End: 1900-01-01

## 2024-03-20 RX ORDER — ERGOCALCIFEROL 1.25 MG/1
1.25 MG CAPSULE, LIQUID FILLED ORAL
Qty: 8 | Refills: 0 | Status: ACTIVE | COMMUNITY
Start: 2024-03-20 | End: 1900-01-01

## 2024-03-20 RX ORDER — LEVOTHYROXINE SODIUM 75 UG/1
75 CAPSULE ORAL
Qty: 90 | Refills: 1 | Status: ACTIVE | COMMUNITY
Start: 2024-03-20 | End: 1900-01-01

## 2024-03-20 NOTE — ASSESSMENT
[FreeTextEntry1] : 1. T2D A1C goal <7% A1C - 9% (6/16/23) from 8.2% (9/19/22) from 7.5 (3/2019) Current regimen: Metformin 500mg (4 tablets daily), Jardiance 25mg daily, Januvia 100mg Glucometer readings at home reveal increasing fasting BG 130s Glucometer reading performed in office today is at goal postprandially (144)  Kaleigh endorses improvement of BG off of chemo/dexamethasone cycles and with initiation of Januvia. Did not need to initiate insulin therapy. -- labs today -- continue metformin -- continue jardiance 25mg -- continue Januvia 100mg  2-3. Postoperative hypothyroidism, h/o papillary thyroid cancer T3N0M0, per pathology S/p total thyroidectomy in 3/2017 S/p I-131 30 mCi in July 2018 Current regimen: Levothyroxine 288mcg daily TSH for goal 0.5-2.0 uIU/mL Last ultrasound 2018 with no evidence of structural recurrence/metastatic disease 9/2022 Tg AB <20 and Tg <0.2 LT4 increased to 288mcg QAM following TSH trending upward, repeated ~1 week after dose titration and found to be 2.58 (from 9/10) -- repeat TFTs -- labs today, including Tg -- plan for repeat thyroid US   Follow-up in 3 months with myself or Dr Ju Maldonado, MS, [facility], [facility] 09/12/2023

## 2024-03-20 NOTE — ADDENDUM
[FreeTextEntry1] : 9/14/23, addendum, SG A1C 9.2%, but with fructosamine 325, c/w A1C of 7.2% TSH at goal, but with elevated FT4 (3.3), decrease LT4 to 275mcg QAM and repeat TFTs in 6 weeks (early November) Chol mildly elevated CMP WNL UCR elevated, continue glycemic control and ACEi  3/20/24, addendum, SG S/p chemo and just finished RT in mid-January Recent TFTs with TSH 69 and FT4 1.4 -- switch from LT4 tablet to capsule to enhance T4 absorption given continued suboptimal thyroid levels on high LT4 doses. -- switch from 275mcg tablet levothyroxine to 275mcg of Tirosint (or generic) - - -

## 2024-03-20 NOTE — HISTORY OF PRESENT ILLNESS
[FreeTextEntry1] : HENRY PERDOMO is a 58 year female with pmhx of T2D, postoperative hypothyroidism, papillary thyroid cancer (s/p total thyroidectomy 3/2017), breast cancer (currently undergoing chemotherapy with plan for surgery and radiation therapy following completion of chemo) who presents for T2D and thyroid follow-up.  Patient of Dr. Dodd, last visit, 23 Last visit with myself, 2023  Interval change: Since last visit, increased LT4 to 288mcg QAM in mid- Repeat TFTs 1 week later with TSH 2.54 (no FT4 performed) Since last visit, did not need to continue chemo as previously planned, s/p lumpectomy last week and will have RT Endorses sugars ok off of chemo with Dex cycles, also since starting Januvia BG readings, as below Less metformin over the past 2 weeks r/t surgery   A1C- 9% (23) from 8.2% (22) from 7.5 (3/2019) Office Fingerstick -- 144 postprandially  Current medication: - Metformin 500mg 4 tablets daily - Jardiance 25mg daily - Januvia 100mg daily   Past medication: - NONE  HENRY reports they take their diabetes medication most of the time. HENRY denies hypoglycemia symptoms or BG <70  Diabetes Self-Management: Glucometer: None fasting Bs No postprandial BG readings  Diet-- ~2 meals daily Beverages: water, rare soda  Ophthalmology:  Podiatry: UTD  Denies personal history of heart disease. Denies personal history of renal disease.  2-3. Postoperative hypothyroidism, h/o papillary thyroid cancer T3N0M0, per pathology: papillary thyroid carcinoma, multifocal (3 foci); follicular variant of papillary carcinoma, infiltrative, 0.5 cm focus in right lobe upper pole with limited extrathyroidal extension and positive margins; follicular variant of papillary carcinoma, two foci in left lobe, confined to the thyroid, a 1.3 cm circumscribed nodule in mid pole, and a 0.5 cm infiltrative focus in lower pole; no positive lymph nodes. S/p total thyroidectomy in 3/2017 S/p I-131 30 mCi in 2018  Current regimen: Levothyroxine 288mcg QAM (increased from 275mcg in mid-)

## 2024-03-23 ENCOUNTER — TRANSCRIPTION ENCOUNTER (OUTPATIENT)
Age: 59
End: 2024-03-23

## 2024-03-26 ENCOUNTER — RESULT REVIEW (OUTPATIENT)
Age: 59
End: 2024-03-26

## 2024-03-26 ENCOUNTER — APPOINTMENT (OUTPATIENT)
Dept: INTERVENTIONAL RADIOLOGY/VASCULAR | Facility: HOSPITAL | Age: 59
End: 2024-03-26

## 2024-03-26 ENCOUNTER — OUTPATIENT (OUTPATIENT)
Dept: OUTPATIENT SERVICES | Facility: HOSPITAL | Age: 59
LOS: 1 days | End: 2024-03-26
Payer: MEDICAID

## 2024-03-26 DIAGNOSIS — Z98.890 OTHER SPECIFIED POSTPROCEDURAL STATES: Chronic | ICD-10-CM

## 2024-03-26 DIAGNOSIS — Z98.891 HISTORY OF UTERINE SCAR FROM PREVIOUS SURGERY: Chronic | ICD-10-CM

## 2024-03-26 DIAGNOSIS — Z90.710 ACQUIRED ABSENCE OF BOTH CERVIX AND UTERUS: Chronic | ICD-10-CM

## 2024-03-26 LAB — GLUCOSE BLDC GLUCOMTR-MCNC: 204 MG/DL — HIGH (ref 70–99)

## 2024-03-26 PROCEDURE — 36590 REMOVAL TUNNELED CV CATH: CPT

## 2024-03-26 PROCEDURE — 82962 GLUCOSE BLOOD TEST: CPT

## 2024-03-27 ENCOUNTER — RESULT REVIEW (OUTPATIENT)
Age: 59
End: 2024-03-27

## 2024-03-27 ENCOUNTER — OUTPATIENT (OUTPATIENT)
Dept: OUTPATIENT SERVICES | Facility: HOSPITAL | Age: 59
LOS: 1 days | End: 2024-03-27
Payer: MEDICAID

## 2024-03-27 DIAGNOSIS — Z90.710 ACQUIRED ABSENCE OF BOTH CERVIX AND UTERUS: Chronic | ICD-10-CM

## 2024-03-27 DIAGNOSIS — Z92.89 PERSONAL HISTORY OF OTHER MEDICAL TREATMENT: ICD-10-CM

## 2024-03-27 DIAGNOSIS — Z98.891 HISTORY OF UTERINE SCAR FROM PREVIOUS SURGERY: Chronic | ICD-10-CM

## 2024-03-27 DIAGNOSIS — Z98.890 OTHER SPECIFIED POSTPROCEDURAL STATES: Chronic | ICD-10-CM

## 2024-03-27 PROCEDURE — 93306 TTE W/DOPPLER COMPLETE: CPT | Mod: 26

## 2024-03-27 PROCEDURE — 93306 TTE W/DOPPLER COMPLETE: CPT

## 2024-03-29 ENCOUNTER — APPOINTMENT (OUTPATIENT)
Dept: BREAST CENTER | Facility: CLINIC | Age: 59
End: 2024-03-29

## 2024-04-02 ENCOUNTER — APPOINTMENT (OUTPATIENT)
Dept: BREAST CENTER | Facility: CLINIC | Age: 59
End: 2024-04-02

## 2024-04-03 ENCOUNTER — LABORATORY RESULT (OUTPATIENT)
Age: 59
End: 2024-04-03

## 2024-04-03 ENCOUNTER — APPOINTMENT (OUTPATIENT)
Dept: HEMATOLOGY ONCOLOGY | Facility: CLINIC | Age: 59
End: 2024-04-03

## 2024-04-05 ENCOUNTER — NON-APPOINTMENT (OUTPATIENT)
Age: 59
End: 2024-04-05

## 2024-04-05 LAB
ALBUMIN SERPL ELPH-MCNC: 3.6 G/DL
ALP BLD-CCNC: 68 U/L
ALT SERPL-CCNC: 46 U/L
ANION GAP SERPL CALC-SCNC: 10 MMOL/L
AST SERPL-CCNC: 46 U/L
BILIRUB SERPL-MCNC: 0.5 MG/DL
BUN SERPL-MCNC: 11 MG/DL
CALCIUM SERPL-MCNC: 9.5 MG/DL
CHLORIDE SERPL-SCNC: 104 MMOL/L
CO2 SERPL-SCNC: 31 MMOL/L
CREAT SERPL-MCNC: 0.9 MG/DL
CRP SERPL-MCNC: 27.3 MG/L
EGFR: 75 ML/MIN/1.73M2
ERYTHROCYTE [SEDIMENTATION RATE] IN BLOOD BY WESTERGREN METHOD: 39 MM/HR
GLUCOSE SERPL-MCNC: 200 MG/DL
HBV CORE IGG+IGM SER QL: NONREACTIVE
HBV SURFACE AB SER QL: NONREACTIVE
HBV SURFACE AG SER QL: NONREACTIVE
HCV AB SER QL: NONREACTIVE
HCV S/CO RATIO: 0.04 S/CO
HEPB DNA PCR INT: NOT DETECTED
HEPB DNA PCR LOG: NOT DETECTED LOGIU/ML
POTASSIUM SERPL-SCNC: 4.6 MMOL/L
PROT SERPL-MCNC: 8.4 G/DL
SODIUM SERPL-SCNC: 145 MMOL/L
TSH SERPL-ACNC: 28.2 UIU/ML

## 2024-04-07 ENCOUNTER — NON-APPOINTMENT (OUTPATIENT)
Age: 59
End: 2024-04-07

## 2024-04-11 LAB
ALBUMIN MFR SERPL ELPH: 48.4 %
ALBUMIN SERPL-MCNC: 4 G/DL
ALBUMIN/GLOB SERPL: 1 RATIO
ALPHA1 GLOB MFR SERPL ELPH: 3.5 %
ALPHA1 GLOB SERPL ELPH-MCNC: 0.3 G/DL
ALPHA2 GLOB MFR SERPL ELPH: 13 %
ALPHA2 GLOB SERPL ELPH-MCNC: 1.1 G/DL
B-GLOBULIN MFR SERPL ELPH: 14.9 %
B-GLOBULIN SERPL ELPH-MCNC: 1.2 G/DL
DEPRECATED KAPPA LC FREE/LAMBDA SER: 2.06 RATIO
GAMMA GLOB FLD ELPH-MCNC: 1.7 G/DL
GAMMA GLOB MFR SERPL ELPH: 20.2 %
IGA SER QL IEP: 428 MG/DL
IGG SER QL IEP: 1863 MG/DL
IGM SER QL IEP: 68 MG/DL
INTERPRETATION SERPL IEP-IMP: NORMAL
KAPPA LC CSF-MCNC: 3.09 MG/DL
KAPPA LC SERPL-MCNC: 6.37 MG/DL
M PROTEIN SPEC IFE-MCNC: NORMAL
PROT SERPL-MCNC: 8.2 G/DL
PROT SERPL-MCNC: 8.2 G/DL

## 2024-04-19 ENCOUNTER — NON-APPOINTMENT (OUTPATIENT)
Age: 59
End: 2024-04-19

## 2024-04-22 ENCOUNTER — NON-APPOINTMENT (OUTPATIENT)
Age: 59
End: 2024-04-22

## 2024-05-03 ENCOUNTER — NON-APPOINTMENT (OUTPATIENT)
Age: 59
End: 2024-05-03

## 2024-05-17 ENCOUNTER — NON-APPOINTMENT (OUTPATIENT)
Age: 59
End: 2024-05-17

## 2024-06-07 ENCOUNTER — NON-APPOINTMENT (OUTPATIENT)
Age: 59
End: 2024-06-07

## 2024-06-20 ENCOUNTER — APPOINTMENT (OUTPATIENT)
Dept: HEMATOLOGY ONCOLOGY | Facility: CLINIC | Age: 59
End: 2024-06-20
Payer: MEDICAID

## 2024-06-20 ENCOUNTER — LABORATORY RESULT (OUTPATIENT)
Age: 59
End: 2024-06-20

## 2024-06-20 VITALS
SYSTOLIC BLOOD PRESSURE: 124 MMHG | RESPIRATION RATE: 18 BRPM | WEIGHT: 230 LBS | HEART RATE: 81 BPM | HEIGHT: 67 IN | BODY MASS INDEX: 36.1 KG/M2 | DIASTOLIC BLOOD PRESSURE: 85 MMHG | TEMPERATURE: 98.1 F | OXYGEN SATURATION: 97 %

## 2024-06-20 DIAGNOSIS — Z51.11 ENCOUNTER FOR ANTINEOPLASTIC CHEMOTHERAPY: ICD-10-CM

## 2024-06-20 DIAGNOSIS — C50.919 MALIGNANT NEOPLASM OF UNSPECIFIED SITE OF UNSPECIFIED FEMALE BREAST: ICD-10-CM

## 2024-06-20 DIAGNOSIS — C50.911 MALIGNANT NEOPLASM OF UNSPECIFIED SITE OF RIGHT FEMALE BREAST: ICD-10-CM

## 2024-06-20 PROCEDURE — 99214 OFFICE O/P EST MOD 30 MIN: CPT

## 2024-06-20 RX ORDER — PROCHLORPERAZINE MALEATE 10 MG/1
10 TABLET ORAL EVERY 6 HOURS
Qty: 60 | Refills: 2 | Status: DISCONTINUED | COMMUNITY
Start: 2023-03-14 | End: 2024-06-20

## 2024-06-20 RX ORDER — AMITRIPTYLINE HYDROCHLORIDE 25 MG/1
25 TABLET, FILM COATED ORAL
Qty: 90 | Refills: 3 | Status: ACTIVE | COMMUNITY
Start: 2024-03-18 | End: 1900-01-01

## 2024-06-20 RX ORDER — MOMETASONE FUROATE 1 MG/G
0.1 OINTMENT TOPICAL TWICE DAILY
Qty: 1 | Refills: 2 | Status: DISCONTINUED | COMMUNITY
Start: 2023-12-26 | End: 2024-06-20

## 2024-06-20 NOTE — PHYSICAL EXAM
[Restricted in physically strenuous activity but ambulatory and able to carry out work of a light or sedentary nature] : Status 1- Restricted in physically strenuous activity but ambulatory and able to carry out work of a light or sedentary nature, e.g., light house work, office work [Normal] : clear to auscultation bilaterally, no dullness, no wheezing [de-identified] : clear [de-identified] : well healed surgical incision in R breast , now with R breast skin discoloration/hyperpigmentation, no palpable lesions in either breast and axilla [de-identified] : grade 1 dermatitis s/p RT over R breast

## 2024-06-20 NOTE — REVIEW OF SYSTEMS
[Fatigue] : fatigue [Diarrhea: Grade 0] : Diarrhea: Grade 0 [Skin Wound] : no skin wound [Negative] : Allergic/Immunologic [de-identified] : neuropathy in b/l fingers and toes

## 2024-06-20 NOTE — HISTORY OF PRESENT ILLNESS
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   Here today for f/u.  Patient states she first palpated the right breast mass sometime in 2022 which prompted her to seek medical care. Patient's PCP Dr. Radha Sarah sent patient for imaging which yielded a 2.1cm lobulated heterogeneous mass on sonogram 10:00 10FN. She has no prior hx of breast surgery or bx. Denies family history of breast or ovarian cancer. Patient denies skin changes or nipple discharge bilaterally.   22: B/l MG (LHR)- heterogeneously dense. ROCKY. BI-RADS 2 23: R MG & B/l US (R palpable mass)- heterogenously dense. R 2.6 cm mass w/ heterogeneous calcs UOQ (palpable). US- R 2.1cm lobulated heterogeneous mass w/ calcs 10:00 10FN (palpable- rec US bx). L 0.6cm cyst 5:00 6FN. BI-RADS 4 23: R US bx 2.3cm mass 10:00 10FN (heart clip)- IDC (poorly differentiated), ER- (0%), MI- (0%), HER2- (0). Concordant- recommend definitive surgical and oncological management.  3/23/23: MRI- R 2.8cm enhancing mass w/ central necrosis c/w known malignancy UOQ. B/l LN appear morphologically normal. No evidence of multifocal, multicentric, or contralateral disease. BI-RADS 6 3/23/23: L MG- heterogenously dense. L stable punctate microcalcs. BI-RADS 2 23 PFT: Normal FEC1/FVC ratio, no significant change with bronchodilator via spacer. Lung capacity is moderately-severely decreased. Diffusion capacity is moderately decreased. 23: B/l MG & US- heterogeneously dense. R focal asymmetry w/ tissue marker bx proven malignancy 14FN. L indeterminant amorphous calcs (rec stereo bx). US- R 2.5 cm irregular shaped mass bx proven malignancy w/ measurements similar to prior study and overall volume significant decrease 10:00 10FN. BI-RADS 4 23 MRI breast: Good treatment response, with no residual mass in the right upper outer breast. Mass previously measured 2.8 x 2.5 x 2.8 cm.  Patient has known amorphous calcifications in the left central breast for which biopsy was recommended earlier same day. 23 LLL lung biopsy: Lung parenchyma with lympho-plasmacytic aggregates, edematous Boop-like foci, rare poorly formed microgranulomas, and diffuse reactive/ metaplastic epithelial changes. No increased eosinophils visualized. No malignancy visualized. AFB and GMS stain for fungus negative. 23: L stereo bx x 2- SITE 1) L lateral calcs (cork clip)- benign breast tissue w/ mild fibrocystic changes and calcs, SITE 2) L central calcs (hourglass clip)- benign breast tissue w/ fibrocystic changes associated w/ calcs. CONCORDANCE PENDING. 23: Successful identification of a right axillary sentinel lymph node. 24 DIGITAL UNILATERAL LEFT DIAGNOSTIC MAMMOGRAM WITH CAD AND TOMOSYNTHESIS No mammographic evidence of malignancy. No significant change. Patient due for yearly mammogram in 2023.   OBhx: Menarche 17yo LMP s/p partial hysterectomy at age 47yo  did not breastfeed, age at first birth 18yo.  Denies h/o fertility treatments, denies h/o HRT and OCP [de-identified] : Patient here today for f/u.  Reports fatigue and numbness/tingling in b/l fingers, mild not affecting function but persistently there. Reports trial of gabapentin without relief, on amitriptyline now with some relief.  Dry skin and discoloration around R nipple.

## 2024-06-20 NOTE — ASSESSMENT
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   cT2 cN0 Stage IIB  Reviewed clinical utility of neoadjuvant chemoimmunotherapy as per the Keynote 522 clinical trial - recommending starting weekly carbo/taxol x12 followed by adriamycin/cytoxan every 2 weeks with continued pembrolizumab every 6 weeks throughout duration of chemotherapy IV through chemoport with growth factor support (Neulasta onbody injector) after AC.  Reviewed potential side effects of chemotherapy including but not limited to allergic reactions/infusion reactions, bone marrow suppression leading to fatigue and increased risk of infection and febrile neutropenia,anemia and thrombocytopenia needing supportive transfusions,hair loss, skin/nail changes, nausea/vomiting, stomatitis, abdominal pain, diarrhea/constipation, kidney and liver dysfunction with electrolyte disturbances, peripheral neuropathy, small risk of cardiomyopathy and secondary malignancies (ie hematologic malignancies due to chemotherapy). Also discussed potential side effects of immunotherapy (pembro) including inflammation of colon leading to colitis, inflammation of lining of lung or heart for example or other organs leading to pleuritis and pericarditis respectively, also other possible conditions such as hypophysitis or pancreatitis; reviewed treatment in such a case would involve stopping of the offending agent and initiating steroids.   Re-reviewed above risks of treatment, patient had all her questions answered and expressed verbal understanding and agreement to proceed with treatment. She signed a consent form.    5/31/23 To proceed with week 10*(correction) of treatment (off carboplatin due to neutropenia with week 4 of treatment) to proceed with Taxol; Pembro was given on 3/29, 5/10 and will continue every 6 wks. Compazine 10mg PO Q6hrs as needed for nausea/vomiting.  Cr increased today to 1.3 from 1.0 likely due to pembro administration on 5/10. To continue to monitor, encouraged patient to increase oral hydration. Will continue to monitor peripheral neuropathy affecting her hands, for now at grade 1.   06/07/23 ANC at goal, grade 1 neuropathy present.  Proceed with week 11 of Taxol.  Reports left throat pain when swallowing. Possible mucositis. Will prescribe MMW. If no improvement, will refer to ENT.  Will continue to monitor right arm pruritic rash, may be grade 1 IrAE. Recommend topical steroid.   6/14/23 Pt tearful today, states exhausted, neuropathy still grade 1 in hands, and 1to 2 in feet. Decided to hold Taxol today #12/12 today.  To start AC #1 with growth factor, and Pembro on 6/21/23.  On exam good response, no palpable breast mass.  MMW prn for throat pain which patient found helpful.  Dr Villa referral for anxiety.  6/21/23 To proceed with 1st AC treatment with Neulasta Onpro today and Pembro. Clinically with excellent response to treatment and good tolerance.  AE - grade 1 peripheral neuropathy in hands/feet.  Compazine prn nausea.   7/5/23  Pt with SOB on exertion after last treatment (AC and Pembro), no leg swelling. Symptoms present but much improved. [facility]A done in [facility] at [facility] negative for PE but with cavitary lesions which pt notes could be due to her previous diagnosis of BOOP. To check EKG, Echo, pulm evaluation with CT chest and bronchoscopy (discussed with Dr Melvina Campa).  Holding treatment today.   7/21/23 Echo with decrease in EF to 55% down from 70%.  s/p bronch with no evidence of malignancy and infection, but unclear if changes on CT chest are due to prior h/o BOOP vs drug induced pneumonitis - reviewed with patient in detail.  Referred to Dr Ivy, cardiologist due to decrease in EF.  Discussed case with Dr Kaity Bernal, whose team will set up  breast.   8/11/23 Here for f/u after seeing cardiologist and pulmonologist, s/p lung bx c/w BOOP vs reaction to Keytruda.  Dr Medina stated further adriamycin treatment can be given with carvedilol and Echo after treatment.  Given excellent response to chemotherapy on MR breast with CR on imaging) and possibility of drug reaction (ie to Cytoxa or Keytruda) with previous drop in EF, favor holding therapy for now and reassessing need for adjuvant treatment after breast surgery. Reviewed with patient that based on surgical pathology she may need additional immunotherapy +/- xeloda in case of no pCR; reviewed that the decision to resume Keytruda will be made with input from pulmonary. Patient to obtain cardiac, pulmonary clearance and letter from PMD prior to breast surgery.  9/18/23 Here for follow up after right lump and slnb on 9/5/23 c/w pCR.  Reviewed surgical pathology in detail discussing with patient the standard of care of continuing Pembrolizumab usually even in the setting of pCR but given her history of BOOP and the likely exacerbation of BOOP while on immunotherapy, I recommended holding any additional immunotherapy.  Also discussed that if there was no pCR would consider adj systemic therapy with Pembro +/- completion of AC but given pCR as well as prior AEs while on systemic therapy (including decrease in EF and SOB/likely exacerbation of BOOP) prefer not to resume treatment as the risks likely outweight the benefits given pCR.  No need for Xeloda given pCR reviewed as well.  Recommended to see radiation oncologist for adj treatment.   3/18/24 Here for f/u after completing adj RT in 1/2024. Pt c/o fatigue. s/p L breast mammo on 2/23/24 Birads 2, due for annual bilateral mammogram in July 2024, to see Dr FINN team today. Reviewed that surveillance breast imaging will be ordered by surgical team.  Blood work to evaluate possible etiologies of patient's fatigue incl micronutrient levels and thyroid function tests. IR for port removal.  Echo to follow up on prior study, asked pt to see cardiologist, Dr Carol Kraft. Amitriptyline 25mg/day for peripheral neuropathy (not affecting function, in her hands only) likely Taxol induced.  PT for limited ROM at R shoulder.  Asked pt to return in 3 months for follow up. Asked to return for follow up in 3 months and for now to continue to regularly follow up with her endocrinologist, and PMD.  6/20/24 Here for routine f/u appt, ROCKY currently as of breast imaging from 2/2024. Scheduled to f/u with Dr FINN and repeat imaging at about a six month interval in 8/2024.  Fatigue likely related to hypothyroidism, asked pt to see her ENDO.  For periph neuropathy chemo induced, grade 1 to continue amitriptyline 25mg PO daily (reviewed instructions not to combine it with Gabapentin, to take on its own at night).  Asked pt to f/u with her pulmologist as well.  Reviewed recs for other age appropriate cancer screening and recommended to complete a colonoscopy for which patient states she has a referral for from her PMD.  RTC in 4 months for follow up. Reviewed that I will be leaving practice at the end of June but that pt can continue her care with Dr Isabel.

## 2024-06-25 DIAGNOSIS — R79.89 OTHER SPECIFIED ABNORMAL FINDINGS OF BLOOD CHEMISTRY: ICD-10-CM

## 2024-06-25 LAB
ALBUMIN SERPL ELPH-MCNC: 3.8 G/DL
ALP BLD-CCNC: 66 U/L
ALT SERPL-CCNC: 60 U/L
ANION GAP SERPL CALC-SCNC: 10 MMOL/L
AST SERPL-CCNC: 69 U/L
BILIRUB SERPL-MCNC: 0.4 MG/DL
BUN SERPL-MCNC: 20 MG/DL
CALCIUM SERPL-MCNC: 10.4 MG/DL
CHLORIDE SERPL-SCNC: 102 MMOL/L
CO2 SERPL-SCNC: 32 MMOL/L
CREAT SERPL-MCNC: 1.2 MG/DL
CRP SERPL-MCNC: 5.5 MG/L
EGFR: 53 ML/MIN/1.73M2
ERYTHROCYTE [SEDIMENTATION RATE] IN BLOOD BY WESTERGREN METHOD: 28 MM/HR
GLUCOSE SERPL-MCNC: 190 MG/DL
HCT VFR BLD CALC: 39.9 %
HGB BLD-MCNC: 12 G/DL
LDH SERPL-CCNC: 233 U/L
LYMPHOCYTES # BLD AUTO: 1.8 K/UL
LYMPHOCYTES NFR BLD AUTO: 31.9 %
MAN DIFF?: NO
MCHC RBC-ENTMCNC: 26.8 PG
MCHC RBC-ENTMCNC: 30.1 GM/DL
MCV RBC AUTO: 89.1 FL
NEUTROPHILS # BLD AUTO: 3.5 K/UL
NEUTROPHILS NFR BLD AUTO: 61.3 %
PLATELET # BLD AUTO: 272 K/UL
POTASSIUM SERPL-SCNC: 4.8 MMOL/L
PROT SERPL-MCNC: 8.8 G/DL
RBC # BLD: 4.48 M/UL
RBC # FLD: 15.4 %
SODIUM SERPL-SCNC: 144 MMOL/L
TSH SERPL-ACNC: 61.4 UIU/ML
WBC # FLD AUTO: 5.7 K/UL

## 2024-06-26 ENCOUNTER — NON-APPOINTMENT (OUTPATIENT)
Age: 59
End: 2024-06-26

## 2024-07-09 ENCOUNTER — APPOINTMENT (OUTPATIENT)
Dept: ENDOCRINOLOGY | Facility: CLINIC | Age: 59
End: 2024-07-09
Payer: MEDICAID

## 2024-07-09 VITALS
DIASTOLIC BLOOD PRESSURE: 98 MMHG | BODY MASS INDEX: 36.02 KG/M2 | WEIGHT: 230 LBS | SYSTOLIC BLOOD PRESSURE: 158 MMHG | HEART RATE: 80 BPM

## 2024-07-09 DIAGNOSIS — E89.0 POSTPROCEDURAL HYPOTHYROIDISM: ICD-10-CM

## 2024-07-09 DIAGNOSIS — E11.9 TYPE 2 DIABETES MELLITUS W/OUT COMPLICATIONS: ICD-10-CM

## 2024-07-09 DIAGNOSIS — C73 MALIGNANT NEOPLASM OF THYROID GLAND: ICD-10-CM

## 2024-07-09 LAB
GLUCOSE BLDC GLUCOMTR-MCNC: 233
HBA1C MFR BLD HPLC: 10.6

## 2024-07-09 PROCEDURE — 82962 GLUCOSE BLOOD TEST: CPT

## 2024-07-09 PROCEDURE — G2211 COMPLEX E/M VISIT ADD ON: CPT | Mod: NC

## 2024-07-09 PROCEDURE — 99214 OFFICE O/P EST MOD 30 MIN: CPT

## 2024-07-09 PROCEDURE — 83036 HEMOGLOBIN GLYCOSYLATED A1C: CPT | Mod: QW

## 2024-07-09 RX ORDER — TIRZEPATIDE 7.5 MG/.5ML
7.5 INJECTION, SOLUTION SUBCUTANEOUS
Qty: 1 | Refills: 5 | Status: ACTIVE | COMMUNITY
Start: 2024-07-09 | End: 1900-01-01

## 2024-07-09 RX ORDER — TIRZEPATIDE 2.5 MG/.5ML
2.5 INJECTION, SOLUTION SUBCUTANEOUS
Qty: 1 | Refills: 0 | Status: ACTIVE | COMMUNITY
Start: 2024-07-09 | End: 2024-08-08

## 2024-07-09 RX ORDER — TIRZEPATIDE 5 MG/.5ML
5 INJECTION, SOLUTION SUBCUTANEOUS
Qty: 1 | Refills: 0 | Status: ACTIVE | COMMUNITY
Start: 2024-07-09 | End: 2024-08-08

## 2024-07-19 ENCOUNTER — NON-APPOINTMENT (OUTPATIENT)
Age: 59
End: 2024-07-19

## 2024-08-07 ENCOUNTER — NON-APPOINTMENT (OUTPATIENT)
Age: 59
End: 2024-08-07

## 2024-08-08 ENCOUNTER — APPOINTMENT (OUTPATIENT)
Dept: PULMONOLOGY | Facility: CLINIC | Age: 59
End: 2024-08-08

## 2024-08-08 PROCEDURE — 99213 OFFICE O/P EST LOW 20 MIN: CPT

## 2024-08-09 ENCOUNTER — NON-APPOINTMENT (OUTPATIENT)
Age: 59
End: 2024-08-09

## 2024-08-09 NOTE — HISTORY OF PRESENT ILLNESS
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  Bronchoscopy showed: LUNG, RIGHT MIDDLE LOBE, FNA: NEGATIVE FOR MALIGNANT CELLS The specimen is hypocellular.  Examination reveals rare fragments of crushed lung parenchyma and some fibrous tissue.  Rare ciliated bronchial epithelial cells are also noted.  The cellblock is scant and shows similar findings.  Lung right middle lobe foreceps biopsy and slide 1.  Lung, right middle lobe, biopsy and touch prep:-  Crushed lung parenchyma with mild mixed inflammation, see note Note: Multiple levels of the specimen are examined.  Examination reveals crushed alveolar lung parenchyma.  There is a mild, mixed inflammatory infiltrate composed of neutrophils, lymphocytes, and rare plasma cells. Granulomatous inflammation is not identified.  There are no features of an organizing pneumonia or interstitial lung disease.  There is no evidence of neoplasia.  Overall these findings are nonspecific and do not account for a mass or lesion.   She was mildly dyspneic and her PFT from Jul;y 2023 showed mild restriction and moderately reduced DLCO. TTNA was performed on 8/17/23: Pathology revealed:, left lower lobe, core biopsy :Lung parenchyma with lympho-plasmacytic aggregates, edematous Boop-like foci, rare poorly formed microgranulomas, and diffuse reactive/metaplastic epithelial changes.  No increased eosinophils visualized.  No malignancy visualized (see note)  Tumor Proportion Score: <1% / Negative  Month later she already felt better, dyspnea on exertion resolved. Able to walk10 block, but when moving up to the hill she has some dyspnea.  She denied nocturnal symptoms. repeat PFT: 8/15/23: FEV1:1.99L (81% FVC: 2.47L(80%) FEV1/FVC:80 TLCL3.48L(64%) VC:2.47L(80%) DLCO:53%  10/5/2023: s/p R  lumpx & SNB- no residual invasive carcinoma, LCIS, negative margins, 0/3LN on 9/5/2023. Patient is recovering well. Exercise tolerance goof can walk 10 block or even more, flat. Lung findings were though to be immunotherapy triggered boop recurrence with limited symptoms and improving after cessation of immunotherapy. She denies any dyspnea except when using chemicals for cleaning, which can trigger dyspnea. Dyspnea goes away when she leaves the room. Off any treatment for cancer, not needed based on path as per onc.  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 Feels fatigued, however started her RT, she had two weeks of therapy, scheduled for 4 weeks total. No dyspnea, fatigue is related to low energy. No cough. No triggers for dyspnea, but is limited by aforementioned weakness. Can walk about 10 blocks on flat surface, does not attempt stairs. No fevers or chills. No plans for further therapy after radiation at this time. No LE swelling or pain.   08/08/24 Follow up after completing 4240cGy of radiation to right breast on 01/2024. Appetite is back to normal, still feels slighlty fatigue.  Denies SOB at rest or during exertion. She is able to ambulate 15 blocks and climb 2 flights of stairs as she lives on second floor.  No sickness and hospitalization since last visit.  On 03/27 Echo with decrease in EF to 55% down from 70%. She did not follow up with cardiologist. Pt denies chest pain, SOB, nausea and vomiting. She gained 10 lbs since spring 2024.

## 2024-08-09 NOTE — HISTORY OF PRESENT ILLNESS
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  Bronchoscopy showed: LUNG, RIGHT MIDDLE LOBE, FNA: NEGATIVE FOR MALIGNANT CELLS The specimen is hypocellular.  Examination reveals rare fragments of crushed lung parenchyma and some fibrous tissue.  Rare ciliated bronchial epithelial cells are also noted.  The cellblock is scant and shows similar findings.  Lung right middle lobe foreceps biopsy and slide 1.  Lung, right middle lobe, biopsy and touch prep:-  Crushed lung parenchyma with mild mixed inflammation, see note Note: Multiple levels of the specimen are examined.  Examination reveals crushed alveolar lung parenchyma.  There is a mild, mixed inflammatory infiltrate composed of neutrophils, lymphocytes, and rare plasma cells. Granulomatous inflammation is not identified.  There are no features of an organizing pneumonia or interstitial lung disease.  There is no evidence of neoplasia.  Overall these findings are nonspecific and do not account for a mass or lesion.   She was mildly dyspneic and her PFT from Jul;y 2023 showed mild restriction and moderately reduced DLCO. TTNA was performed on 8/17/23: Pathology revealed:, left lower lobe, core biopsy :Lung parenchyma with lympho-plasmacytic aggregates, edematous Boop-like foci, rare poorly formed microgranulomas, and diffuse reactive/metaplastic epithelial changes.  No increased eosinophils visualized.  No malignancy visualized (see note)  Tumor Proportion Score: <1% / Negative  Month later she already felt better, dyspnea on exertion resolved. Able to walk10 block, but when moving up to the hill she has some dyspnea.  She denied nocturnal symptoms. repeat PFT: 8/15/23: FEV1:1.99L (81% FVC: 2.47L(80%) FEV1/FVC:80 TLCL3.48L(64%) VC:2.47L(80%) DLCO:53%  10/5/2023: s/p R  lumpx & SNB- no residual invasive carcinoma, LCIS, negative margins, 0/3LN on 9/5/2023. Patient is recovering well. Exercise tolerance goof can walk 10 block or even more, flat. Lung findings were though to be immunotherapy triggered boop recurrence with limited symptoms and improving after cessation of immunotherapy. She denies any dyspnea except when using chemicals for cleaning, which can trigger dyspnea. Dyspnea goes away when she leaves the room. Off any treatment for cancer, not needed based on path as per onc.  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 Feels fatigued, however started her RT, she had two weeks of therapy, scheduled for 4 weeks total. No dyspnea, fatigue is related to low energy. No cough. No triggers for dyspnea, but is limited by aforementioned weakness. Can walk about 10 blocks on flat surface, does not attempt stairs. No fevers or chills. No plans for further therapy after radiation at this time. No LE swelling or pain.   08/08/24 Follow up after completing 4240cGy of radiation to right breast on 01/2024. Appetite is back to normal, still feels slighlty fatigue.  Denies SOB at rest or during exertion. She is able to ambulate 15 blocks and climb 2 flights of stairs as she lives on second floor.  No sickness and hospitalization since last visit.  On 03/27 Echo with decrease in EF to 55% down from 70%. She did not follow up with cardiologist. Pt denies chest pain, SOB, nausea and vomiting. She gained 10 lbs since spring 2024.

## 2024-08-09 NOTE — HISTORY OF PRESENT ILLNESS
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  Bronchoscopy showed: LUNG, RIGHT MIDDLE LOBE, FNA: NEGATIVE FOR MALIGNANT CELLS The specimen is hypocellular.  Examination reveals rare fragments of crushed lung parenchyma and some fibrous tissue.  Rare ciliated bronchial epithelial cells are also noted.  The cellblock is scant and shows similar findings.  Lung right middle lobe foreceps biopsy and slide 1.  Lung, right middle lobe, biopsy and touch prep:-  Crushed lung parenchyma with mild mixed inflammation, see note Note: Multiple levels of the specimen are examined.  Examination reveals crushed alveolar lung parenchyma.  There is a mild, mixed inflammatory infiltrate composed of neutrophils, lymphocytes, and rare plasma cells. Granulomatous inflammation is not identified.  There are no features of an organizing pneumonia or interstitial lung disease.  There is no evidence of neoplasia.  Overall these findings are nonspecific and do not account for a mass or lesion.   She was mildly dyspneic and her PFT from Jul;y 2023 showed mild restriction and moderately reduced DLCO. TTNA was performed on 8/17/23: Pathology revealed:, left lower lobe, core biopsy :Lung parenchyma with lympho-plasmacytic aggregates, edematous Boop-like foci, rare poorly formed microgranulomas, and diffuse reactive/metaplastic epithelial changes.  No increased eosinophils visualized.  No malignancy visualized (see note)  Tumor Proportion Score: <1% / Negative  Month later she already felt better, dyspnea on exertion resolved. Able to walk10 block, but when moving up to the hill she has some dyspnea.  She denied nocturnal symptoms. repeat PFT: 8/15/23: FEV1:1.99L (81% FVC: 2.47L(80%) FEV1/FVC:80 TLCL3.48L(64%) VC:2.47L(80%) DLCO:53%  10/5/2023: s/p R  lumpx & SNB- no residual invasive carcinoma, LCIS, negative margins, 0/3LN on 9/5/2023. Patient is recovering well. Exercise tolerance goof can walk 10 block or even more, flat. Lung findings were though to be immunotherapy triggered boop recurrence with limited symptoms and improving after cessation of immunotherapy. She denies any dyspnea except when using chemicals for cleaning, which can trigger dyspnea. Dyspnea goes away when she leaves the room. Off any treatment for cancer, not needed based on path as per onc.  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 Feels fatigued, however started her RT, she had two weeks of therapy, scheduled for 4 weeks total. No dyspnea, fatigue is related to low energy. No cough. No triggers for dyspnea, but is limited by aforementioned weakness. Can walk about 10 blocks on flat surface, does not attempt stairs. No fevers or chills. No plans for further therapy after radiation at this time. No LE swelling or pain.   08/08/24 Follow up after completing 4240cGy of radiation to right breast on 01/2024. Appetite is back to normal, still feels slighlty fatigue.  Denies SOB at rest or during exertion. She is able to ambulate 15 blocks and climb 2 flights of stairs as she lives on second floor.  No sickness and hospitalization since last visit.  On 03/27 Echo with decrease in EF to 55% down from 70%. She did not follow up with cardiologist. Pt denies chest pain, SOB, nausea and vomiting. She gained 10 lbs since spring 2024.

## 2024-08-09 NOTE — END OF VISIT
[FreeTextEntry3] : NP participated on patient's encounter.NP participated on patient's encounter.  I, personally performed the evaluation and management (E/M) services for this established patient who presents today with (a) new problem(s)/exacerbation of (an) existing condition(s).  That E/M includes conducting the examination, assessing all new/exacerbated conditions, and establishing a new plan of care.  Today, my ACP was here to observe and participate on evaluation and management services for this new problem/exacerbated condition to be followed going forward. [Time Spent: ___ minutes] : I have spent [unfilled] minutes of time on the encounter.

## 2024-08-09 NOTE — ASSESSMENT
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 Based on biopsy this does not look like CI related pneumonitis, rather than CI induced/recurrence of native disease. In the past her lung disease resolved without intervention, although it is generally very uncommon. It seems that it happened again and her CI therapy induced boop resolved w/o treatment.  Currently she does not have dyspnea, her ET is good  easily can climb 2 flights of stairs and 15 blocks.  Reports she gained weight almost 10 lbs since the last visit. BP elevated in the clinic today. Advised her to monitor BP and follow up with her PCP. Will repeat Ct chest given completion of radiation therapy.  Repeat PFT to check for current lung function capacity.  RTC in 3 months after tests.

## 2024-08-12 ENCOUNTER — NON-APPOINTMENT (OUTPATIENT)
Age: 59
End: 2024-08-12

## 2024-08-21 ENCOUNTER — APPOINTMENT (OUTPATIENT)
Dept: ENDOCRINOLOGY | Facility: CLINIC | Age: 59
End: 2024-08-21

## 2024-08-23 ENCOUNTER — NON-APPOINTMENT (OUTPATIENT)
Age: 59
End: 2024-08-23

## 2024-08-26 ENCOUNTER — APPOINTMENT (OUTPATIENT)
Dept: BREAST CENTER | Facility: CLINIC | Age: 59
End: 2024-08-26
Payer: MEDICAID

## 2024-08-26 ENCOUNTER — NON-APPOINTMENT (OUTPATIENT)
Age: 59
End: 2024-08-26

## 2024-08-26 VITALS
BODY MASS INDEX: 35.63 KG/M2 | SYSTOLIC BLOOD PRESSURE: 142 MMHG | HEIGHT: 67 IN | DIASTOLIC BLOOD PRESSURE: 88 MMHG | WEIGHT: 227 LBS | HEART RATE: 81 BPM

## 2024-08-26 PROCEDURE — 99213 OFFICE O/P EST LOW 20 MIN: CPT

## 2024-08-26 NOTE — PAST MEDICAL HISTORY
[History of Hormone Replacement Treatment] : has no history of hormone replacement treatment [de-identified] : partial hyserectomy  [FreeTextEntry6] : no [FreeTextEntry7] : no [FreeTextEntry8] : no

## 2024-08-26 NOTE — PHYSICAL EXAM
[de-identified] : R well healing surgical incisions [de-identified] : L breast/axilla/supraclavicular area: No masses, discharge, or adenopathy\par

## 2024-08-26 NOTE — PLAN
[TextEntry] : Reviewed imaging findings and discussed results with patient. Patient can resume high risk MRI 6 months following and SD OV with PA. If benign, she is to return in 1 year for annual B/l MG & US and office visit with me. Continue follow up with medical oncology.

## 2024-08-26 NOTE — HISTORY OF PRESENT ILLNESS
[FreeTextEntry1] : Patient is a 60 yo F who presents today for breast cancer surveillance. Patient is s/p neoadjuvant chemotherapy w/ Dr. 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 She completed TC therapy 6/14/23 and received 1 cycle of AC w/ 3rd of pembro 6/21/23 but could not complete any additional doses due to BOOP vs drug induced pneumonitis and decreased EF. S/p R nloc lumpx & SNB 9/5/23 yielding no residual invasive carcinoma, negative margins, 0/3LN. S/P RT 1/2024 (Dr. Wernicke). Denies family history of breast or ovarian cancer. She is BRCA/9 panel negative (tested 2023). Patient denies skin changes or nipple discharge bilaterally. Of note, patient has hx of papillary thyroid cancer treated w/ total thyroidectomy 3/2017.  TNM Staging(y): pT0, pN0, M0  5/23/22: B/l MG ([facility])- heterogeneously dense. ROCKY. BI-RADS 2 2/7/23: R MG & B/l US (R palpable mass)- heterogenously dense. R 2.6 cm mass w/ heterogeneous calcs UOQ (palpable). US- R 2.1cm lobulated heterogeneous mass w/ calcs 10:00 10FN (palpable- rec US bx). L 0.6cm cyst 5:00 6FN. BI-RADS 4 2/27/23: R US bx 2.3cm mass 10:00 10FN (heart clip)- IDC (poorly differentiated), ER- (0%), DC- (0%), HER2- (0). Concordant- recommend definitive surgical and oncological management. 3/23/23: MRI- R 2.8cm enhancing mass w/ central necrosis c/w known malignancy UOQ. B/l LN appear morphologically normal. No evidence of multifocal, multicentric, or contralateral disease. BI-RADS 6 3/23/23: L MG- heterogenously dense. L stable punctate microcalcs. BI-RADS 2  7/31/23: B/l MG & US- heterogeneously dense. R focal asymmetry w/ tissue marker bx proven malignancy 14FN. L indeterminant amorphous calcs (rec stereo bx). US- R 2.5 cm irregular shaped mass bx proven malignancy w/ measurements similar to prior study and overall volume significant decrease 10:00 10FN. BI-RADS 4 7/31/23: MRI- R mass no longer visualized site of bx proven malignant. R 0.7cm intramammary LN outer central 9FN. Rec stereo bx of L calcs. BI-RADS 4 8/7/23: L stereo bx x 2- SITE 1) L lateral calcs (cork clip)- benign breast tissue w/ mild fibrocystic changes and calcs. Benign and concordant- rec 6m f/u MG SITE 2) L central calcs (hourglass clip)- benign breast tissue w/ fibrocystic changes associated w/ calcs. Benign and concordant- rec 6m f/u MG 9/5/23: R nloc lumpx & SNB- no residual invasive carcinoma, LCIS, negative margins, 0/3LN 2/23/24: L MG- heterogeneously dense, prev noted calcs appear relatively unchanged. BIRADS 2. 8/26/2024 B/L MG/US: Scattered fibroglandular.  R postlumpectomy changes, stable.  BI-RADS 2

## 2024-08-26 NOTE — HISTORY OF PRESENT ILLNESS
[FreeTextEntry1] : Patient is a 58 yo F who presents today for breast cancer surveillance. Patient is s/p neoadjuvant chemotherapy w/ Dr. 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 She completed TC therapy 6/14/23 and received 1 cycle of AC w/ 3rd of pembro 6/21/23 but could not complete any additional doses due to BOOP vs drug induced pneumonitis and decreased EF. S/p R nloc lumpx & SNB 9/5/23 yielding no residual invasive carcinoma, negative margins, 0/3LN. S/P RT 1/2024 (Dr. Wernicke). Denies family history of breast or ovarian cancer. She is BRCA/9 panel negative (tested 2023). Patient denies skin changes or nipple discharge bilaterally. Of note, patient has hx of papillary thyroid cancer treated w/ total thyroidectomy 3/2017.  TNM Staging(y): pT0, pN0, M0  5/23/22: B/l MG ([facility])- heterogeneously dense. ROCKY. BI-RADS 2 2/7/23: R MG & B/l US (R palpable mass)- heterogenously dense. R 2.6 cm mass w/ heterogeneous calcs UOQ (palpable). US- R 2.1cm lobulated heterogeneous mass w/ calcs 10:00 10FN (palpable- rec US bx). L 0.6cm cyst 5:00 6FN. BI-RADS 4 2/27/23: R US bx 2.3cm mass 10:00 10FN (heart clip)- IDC (poorly differentiated), ER- (0%), UT- (0%), HER2- (0). Concordant- recommend definitive surgical and oncological management. 3/23/23: MRI- R 2.8cm enhancing mass w/ central necrosis c/w known malignancy UOQ. B/l LN appear morphologically normal. No evidence of multifocal, multicentric, or contralateral disease. BI-RADS 6 3/23/23: L MG- heterogenously dense. L stable punctate microcalcs. BI-RADS 2  7/31/23: B/l MG & US- heterogeneously dense. R focal asymmetry w/ tissue marker bx proven malignancy 14FN. L indeterminant amorphous calcs (rec stereo bx). US- R 2.5 cm irregular shaped mass bx proven malignancy w/ measurements similar to prior study and overall volume significant decrease 10:00 10FN. BI-RADS 4 7/31/23: MRI- R mass no longer visualized site of bx proven malignant. R 0.7cm intramammary LN outer central 9FN. Rec stereo bx of L calcs. BI-RADS 4 8/7/23: L stereo bx x 2- SITE 1) L lateral calcs (cork clip)- benign breast tissue w/ mild fibrocystic changes and calcs. Benign and concordant- rec 6m f/u MG SITE 2) L central calcs (hourglass clip)- benign breast tissue w/ fibrocystic changes associated w/ calcs. Benign and concordant- rec 6m f/u MG 9/5/23: R nloc lumpx & SNB- no residual invasive carcinoma, LCIS, negative margins, 0/3LN 2/23/24: L MG- heterogeneously dense, prev noted calcs appear relatively unchanged. BIRADS 2. 8/26/2024 B/L MG/US: Scattered fibroglandular.  R postlumpectomy changes, stable.  BI-RADS 2

## 2024-08-26 NOTE — PAST MEDICAL HISTORY
[History of Hormone Replacement Treatment] : has no history of hormone replacement treatment [de-identified] : partial hyserectomy  [FreeTextEntry6] : no [FreeTextEntry7] : no [FreeTextEntry8] : no I have personally seen and examined the patient. I have collaborated with and supervised the

## 2024-08-26 NOTE — PHYSICAL EXAM
[de-identified] : R well healing surgical incisions [de-identified] : L breast/axilla/supraclavicular area: No masses, discharge, or adenopathy\par

## 2024-09-05 ENCOUNTER — NON-APPOINTMENT (OUTPATIENT)
Age: 59
End: 2024-09-05

## 2024-09-06 ENCOUNTER — OUTPATIENT (OUTPATIENT)
Dept: OUTPATIENT SERVICES | Facility: HOSPITAL | Age: 59
LOS: 1 days | End: 2024-09-06
Payer: MEDICAID

## 2024-09-06 ENCOUNTER — NON-APPOINTMENT (OUTPATIENT)
Age: 59
End: 2024-09-06

## 2024-09-06 DIAGNOSIS — Z90.710 ACQUIRED ABSENCE OF BOTH CERVIX AND UTERUS: Chronic | ICD-10-CM

## 2024-09-06 DIAGNOSIS — Z98.890 OTHER SPECIFIED POSTPROCEDURAL STATES: Chronic | ICD-10-CM

## 2024-09-06 DIAGNOSIS — Z98.891 HISTORY OF UTERINE SCAR FROM PREVIOUS SURGERY: Chronic | ICD-10-CM

## 2024-09-06 DIAGNOSIS — R06.02 SHORTNESS OF BREATH: ICD-10-CM

## 2024-09-06 PROCEDURE — 94729 DIFFUSING CAPACITY: CPT

## 2024-09-06 PROCEDURE — 94726 PLETHYSMOGRAPHY LUNG VOLUMES: CPT

## 2024-09-06 PROCEDURE — 94729 DIFFUSING CAPACITY: CPT | Mod: 26

## 2024-09-06 PROCEDURE — 94010 BREATHING CAPACITY TEST: CPT | Mod: 26

## 2024-09-06 PROCEDURE — 94726 PLETHYSMOGRAPHY LUNG VOLUMES: CPT | Mod: 26

## 2024-09-06 PROCEDURE — 94760 N-INVAS EAR/PLS OXIMETRY 1: CPT

## 2024-09-06 PROCEDURE — 94060 EVALUATION OF WHEEZING: CPT

## 2024-10-08 ENCOUNTER — NON-APPOINTMENT (OUTPATIENT)
Age: 59
End: 2024-10-08

## 2024-10-09 ENCOUNTER — APPOINTMENT (OUTPATIENT)
Dept: ENDOCRINOLOGY | Facility: CLINIC | Age: 59
End: 2024-10-09
Payer: MEDICAID

## 2024-10-09 VITALS
DIASTOLIC BLOOD PRESSURE: 88 MMHG | HEART RATE: 85 BPM | SYSTOLIC BLOOD PRESSURE: 147 MMHG | WEIGHT: 228 LBS | BODY MASS INDEX: 35.71 KG/M2

## 2024-10-09 DIAGNOSIS — E11.9 TYPE 2 DIABETES MELLITUS W/OUT COMPLICATIONS: ICD-10-CM

## 2024-10-09 LAB — GLUCOSE BLDC GLUCOMTR-MCNC: 158

## 2024-10-09 PROCEDURE — 99213 OFFICE O/P EST LOW 20 MIN: CPT | Mod: 25

## 2024-10-09 PROCEDURE — 36415 COLL VENOUS BLD VENIPUNCTURE: CPT

## 2024-10-09 PROCEDURE — 82962 GLUCOSE BLOOD TEST: CPT

## 2024-10-11 ENCOUNTER — NON-APPOINTMENT (OUTPATIENT)
Age: 59
End: 2024-10-11

## 2024-10-11 DIAGNOSIS — E89.0 POSTPROCEDURAL HYPOTHYROIDISM: ICD-10-CM

## 2024-10-11 LAB
ALBUMIN SERPL ELPH-MCNC: 4.4 G/DL
ALP BLD-CCNC: 67 U/L
ALT SERPL-CCNC: 36 U/L
ANION GAP SERPL CALC-SCNC: 13 MMOL/L
AST SERPL-CCNC: 31 U/L
BILIRUB SERPL-MCNC: 0.2 MG/DL
BUN SERPL-MCNC: 18 MG/DL
CALCIUM SERPL-MCNC: 10.1 MG/DL
CHLORIDE SERPL-SCNC: 100 MMOL/L
CHOLEST SERPL-MCNC: 169 MG/DL
CO2 SERPL-SCNC: 27 MMOL/L
CREAT SERPL-MCNC: 1.17 MG/DL
CREAT SPEC-SCNC: 72 MG/DL
EGFR: 54 ML/MIN/1.73M2
ESTIMATED AVERAGE GLUCOSE: 275 MG/DL
GLUCOSE SERPL-MCNC: 180 MG/DL
HBA1C MFR BLD HPLC: 11.2 %
HDLC SERPL-MCNC: 54 MG/DL
LDLC SERPL CALC-MCNC: 92 MG/DL
MICROALBUMIN 24H UR DL<=1MG/L-MCNC: 39.1 MG/DL
MICROALBUMIN/CREAT 24H UR-RTO: 542 MG/G
NONHDLC SERPL-MCNC: 115 MG/DL
POTASSIUM SERPL-SCNC: 5.3 MMOL/L
PROT SERPL-MCNC: 8.6 G/DL
SODIUM SERPL-SCNC: 140 MMOL/L
T4 FREE SERPL-MCNC: 2 NG/DL
THYROGLOB AB SERPL-ACNC: 19.6 IU/ML
THYROGLOB SERPL-MCNC: <0.1 NG/ML
TRIGL SERPL-MCNC: 128 MG/DL
TSH SERPL-ACNC: 10.4 UIU/ML
VIT B12 SERPL-MCNC: 938 PG/ML

## 2024-10-11 RX ORDER — LEVOTHYROXINE SODIUM 175 UG/1
175 CAPSULE ORAL
Qty: 90 | Refills: 1 | Status: ACTIVE | COMMUNITY
Start: 2024-10-11 | End: 1900-01-01

## 2024-10-14 ENCOUNTER — NON-APPOINTMENT (OUTPATIENT)
Age: 59
End: 2024-10-14

## 2024-10-15 RX ORDER — DULAGLUTIDE 0.75 MG/.5ML
0.75 INJECTION, SOLUTION SUBCUTANEOUS
Qty: 6 | Refills: 1 | Status: ACTIVE | COMMUNITY
Start: 2024-10-11 | End: 1900-01-01

## 2024-10-21 ENCOUNTER — APPOINTMENT (OUTPATIENT)
Dept: HEMATOLOGY ONCOLOGY | Facility: CLINIC | Age: 59
End: 2024-10-21
Payer: MEDICAID

## 2024-10-21 VITALS
DIASTOLIC BLOOD PRESSURE: 83 MMHG | BODY MASS INDEX: 35.79 KG/M2 | WEIGHT: 228 LBS | RESPIRATION RATE: 17 BRPM | HEART RATE: 77 BPM | TEMPERATURE: 98.4 F | OXYGEN SATURATION: 96 % | HEIGHT: 67 IN | SYSTOLIC BLOOD PRESSURE: 136 MMHG

## 2024-10-21 DIAGNOSIS — C50.911 MALIGNANT NEOPLASM OF UNSPECIFIED SITE OF RIGHT FEMALE BREAST: ICD-10-CM

## 2024-10-21 PROCEDURE — G2211 COMPLEX E/M VISIT ADD ON: CPT | Mod: NC

## 2024-10-21 PROCEDURE — 99203 OFFICE O/P NEW LOW 30 MIN: CPT

## 2024-10-25 ENCOUNTER — NON-APPOINTMENT (OUTPATIENT)
Age: 59
End: 2024-10-25

## 2024-11-22 ENCOUNTER — NON-APPOINTMENT (OUTPATIENT)
Age: 59
End: 2024-11-22

## 2024-12-02 ENCOUNTER — APPOINTMENT (OUTPATIENT)
Dept: ENDOCRINOLOGY | Facility: CLINIC | Age: 59
End: 2024-12-02

## 2024-12-02 ENCOUNTER — NON-APPOINTMENT (OUTPATIENT)
Age: 59
End: 2024-12-02

## 2024-12-02 PROCEDURE — G0108 DIAB MANAGE TRN  PER INDIV: CPT

## 2024-12-06 ENCOUNTER — NON-APPOINTMENT (OUTPATIENT)
Age: 59
End: 2024-12-06

## 2024-12-09 ENCOUNTER — NON-APPOINTMENT (OUTPATIENT)
Age: 59
End: 2024-12-09

## 2024-12-20 ENCOUNTER — NON-APPOINTMENT (OUTPATIENT)
Age: 59
End: 2024-12-20

## 2025-01-08 ENCOUNTER — APPOINTMENT (OUTPATIENT)
Dept: ENDOCRINOLOGY | Facility: CLINIC | Age: 60
End: 2025-01-08
Payer: MEDICAID

## 2025-01-08 VITALS
HEART RATE: 78 BPM | SYSTOLIC BLOOD PRESSURE: 126 MMHG | BODY MASS INDEX: 36.18 KG/M2 | DIASTOLIC BLOOD PRESSURE: 89 MMHG | WEIGHT: 231 LBS

## 2025-01-08 DIAGNOSIS — E11.9 TYPE 2 DIABETES MELLITUS W/OUT COMPLICATIONS: ICD-10-CM

## 2025-01-08 DIAGNOSIS — C73 MALIGNANT NEOPLASM OF THYROID GLAND: ICD-10-CM

## 2025-01-08 DIAGNOSIS — E89.0 POSTPROCEDURAL HYPOTHYROIDISM: ICD-10-CM

## 2025-01-08 LAB — GLUCOSE BLDC GLUCOMTR-MCNC: 150

## 2025-01-08 PROCEDURE — 82962 GLUCOSE BLOOD TEST: CPT

## 2025-01-08 PROCEDURE — 99213 OFFICE O/P EST LOW 20 MIN: CPT

## 2025-01-08 PROCEDURE — 36415 COLL VENOUS BLD VENIPUNCTURE: CPT

## 2025-01-09 ENCOUNTER — NON-APPOINTMENT (OUTPATIENT)
Age: 60
End: 2025-01-09

## 2025-01-10 ENCOUNTER — NON-APPOINTMENT (OUTPATIENT)
Age: 60
End: 2025-01-10

## 2025-01-10 LAB
ALBUMIN SERPL ELPH-MCNC: 4.1 G/DL
ALP BLD-CCNC: 67 U/L
ALT SERPL-CCNC: 38 U/L
ANION GAP SERPL CALC-SCNC: 16 MMOL/L
AST SERPL-CCNC: 37 U/L
BILIRUB SERPL-MCNC: 0.2 MG/DL
BUN SERPL-MCNC: 17 MG/DL
CALCIUM SERPL-MCNC: 9.6 MG/DL
CHLORIDE SERPL-SCNC: 101 MMOL/L
CHOLEST SERPL-MCNC: 184 MG/DL
CO2 SERPL-SCNC: 25 MMOL/L
CREAT SERPL-MCNC: 1.15 MG/DL
CREAT SPEC-SCNC: 285 MG/DL
EGFR: 56 ML/MIN/1.73M2
ESTIMATED AVERAGE GLUCOSE: 226 MG/DL
GLUCOSE SERPL-MCNC: 162 MG/DL
HBA1C MFR BLD HPLC: 9.5 %
HDLC SERPL-MCNC: 60 MG/DL
LDLC SERPL CALC-MCNC: 103 MG/DL
MICROALBUMIN 24H UR DL<=1MG/L-MCNC: 210.5 MG/DL
MICROALBUMIN/CREAT 24H UR-RTO: 738 MG/G
NONHDLC SERPL-MCNC: 124 MG/DL
POTASSIUM SERPL-SCNC: 4.8 MMOL/L
PROT SERPL-MCNC: 8.1 G/DL
SODIUM SERPL-SCNC: 142 MMOL/L
T4 FREE SERPL-MCNC: 1.4 NG/DL
THYROGLOB AB SERPL-ACNC: 15.8 IU/ML
THYROGLOB SERPL-MCNC: 0.19 NG/ML
TRIGL SERPL-MCNC: 120 MG/DL
TSH SERPL-ACNC: 50.1 UIU/ML
VIT B12 SERPL-MCNC: 905 PG/ML

## 2025-01-10 RX ORDER — TIRZEPATIDE 5 MG/.5ML
5 INJECTION, SOLUTION SUBCUTANEOUS
Qty: 1 | Refills: 0 | Status: ACTIVE | COMMUNITY
Start: 2025-01-10 | End: 1900-01-01

## 2025-01-15 ENCOUNTER — TRANSCRIPTION ENCOUNTER (OUTPATIENT)
Age: 60
End: 2025-01-15

## 2025-02-07 ENCOUNTER — NON-APPOINTMENT (OUTPATIENT)
Age: 60
End: 2025-02-07

## 2025-02-21 ENCOUNTER — NON-APPOINTMENT (OUTPATIENT)
Age: 60
End: 2025-02-21

## 2025-02-27 ENCOUNTER — APPOINTMENT (OUTPATIENT)
Dept: HEMATOLOGY ONCOLOGY | Facility: CLINIC | Age: 60
End: 2025-02-27

## 2025-03-07 ENCOUNTER — NON-APPOINTMENT (OUTPATIENT)
Age: 60
End: 2025-03-07

## 2025-04-01 ENCOUNTER — APPOINTMENT (OUTPATIENT)
Dept: ENDOCRINOLOGY | Facility: CLINIC | Age: 60
End: 2025-04-01
Payer: MEDICAID

## 2025-04-01 ENCOUNTER — RESULT CHARGE (OUTPATIENT)
Age: 60
End: 2025-04-01

## 2025-04-01 VITALS
DIASTOLIC BLOOD PRESSURE: 95 MMHG | SYSTOLIC BLOOD PRESSURE: 148 MMHG | WEIGHT: 230 LBS | HEART RATE: 96 BPM | BODY MASS INDEX: 36.02 KG/M2

## 2025-04-01 DIAGNOSIS — E89.0 POSTPROCEDURAL HYPOTHYROIDISM: ICD-10-CM

## 2025-04-01 DIAGNOSIS — E11.9 TYPE 2 DIABETES MELLITUS W/OUT COMPLICATIONS: ICD-10-CM

## 2025-04-01 DIAGNOSIS — C73 MALIGNANT NEOPLASM OF THYROID GLAND: ICD-10-CM

## 2025-04-01 LAB
GLUCOSE BLDC GLUCOMTR-MCNC: 197
HBA1C MFR BLD HPLC: 9.6

## 2025-04-01 PROCEDURE — 98960 EDU&TRN PT SELF-MGMT NQHP 1: CPT

## 2025-04-01 PROCEDURE — 83036 HEMOGLOBIN GLYCOSYLATED A1C: CPT | Mod: QW

## 2025-04-01 PROCEDURE — 82962 GLUCOSE BLOOD TEST: CPT

## 2025-04-01 PROCEDURE — 99214 OFFICE O/P EST MOD 30 MIN: CPT

## 2025-04-01 RX ORDER — BLOOD-GLUCOSE METER
W/DEVICE KIT MISCELLANEOUS
Qty: 1 | Refills: 0 | Status: ACTIVE | COMMUNITY
Start: 2025-04-01 | End: 1900-01-01

## 2025-04-01 RX ORDER — LANCETS 28 GAUGE
EACH MISCELLANEOUS
Qty: 100 | Refills: 3 | Status: ACTIVE | COMMUNITY
Start: 2025-04-01 | End: 1900-01-01

## 2025-04-01 RX ORDER — BLOOD SUGAR DIAGNOSTIC
STRIP MISCELLANEOUS
Qty: 100 | Refills: 3 | Status: ACTIVE | COMMUNITY
Start: 2025-04-01 | End: 1900-01-01

## 2025-04-10 ENCOUNTER — APPOINTMENT (OUTPATIENT)
Dept: HEMATOLOGY ONCOLOGY | Facility: CLINIC | Age: 60
End: 2025-04-10
Payer: MEDICAID

## 2025-04-10 ENCOUNTER — APPOINTMENT (OUTPATIENT)
Dept: BREAST CENTER | Facility: CLINIC | Age: 60
End: 2025-04-10
Payer: MEDICAID

## 2025-04-10 ENCOUNTER — NON-APPOINTMENT (OUTPATIENT)
Age: 60
End: 2025-04-10

## 2025-04-10 VITALS
DIASTOLIC BLOOD PRESSURE: 83 MMHG | SYSTOLIC BLOOD PRESSURE: 141 MMHG | BODY MASS INDEX: 35.81 KG/M2 | RESPIRATION RATE: 18 BRPM | HEART RATE: 97 BPM | TEMPERATURE: 98.3 F | OXYGEN SATURATION: 98 % | HEIGHT: 67 IN | WEIGHT: 228.13 LBS

## 2025-04-10 DIAGNOSIS — C50.911 MALIGNANT NEOPLASM OF UNSPECIFIED SITE OF RIGHT FEMALE BREAST: ICD-10-CM

## 2025-04-10 PROCEDURE — 99213 OFFICE O/P EST LOW 20 MIN: CPT

## 2025-04-10 PROCEDURE — G2211 COMPLEX E/M VISIT ADD ON: CPT | Mod: NC

## 2025-04-11 ENCOUNTER — NON-APPOINTMENT (OUTPATIENT)
Age: 60
End: 2025-04-11

## 2025-05-09 ENCOUNTER — NON-APPOINTMENT (OUTPATIENT)
Age: 60
End: 2025-05-09

## 2025-05-14 DIAGNOSIS — E89.0 POSTPROCEDURAL HYPOTHYROIDISM: ICD-10-CM

## 2025-05-30 ENCOUNTER — NON-APPOINTMENT (OUTPATIENT)
Age: 60
End: 2025-05-30

## 2025-06-03 ENCOUNTER — NON-APPOINTMENT (OUTPATIENT)
Age: 60
End: 2025-06-03

## 2025-07-15 ENCOUNTER — APPOINTMENT (OUTPATIENT)
Dept: ENDOCRINOLOGY | Facility: CLINIC | Age: 60
End: 2025-07-15

## 2025-09-11 ENCOUNTER — NON-APPOINTMENT (OUTPATIENT)
Age: 60
End: 2025-09-11

## (undated) DEVICE — BIOPSY FORCEP J&J MONARCH SMOOTH CUP

## (undated) DEVICE — NDL PREVIEW FLEX TBNA 21G 4/BX